# Patient Record
Sex: FEMALE | Race: WHITE | NOT HISPANIC OR LATINO | Employment: OTHER | RURAL
[De-identification: names, ages, dates, MRNs, and addresses within clinical notes are randomized per-mention and may not be internally consistent; named-entity substitution may affect disease eponyms.]

---

## 2022-03-28 ENCOUNTER — OFFICE VISIT (OUTPATIENT)
Dept: FAMILY MEDICINE CLINIC | Facility: CLINIC | Age: 67
End: 2022-03-28

## 2022-03-28 VITALS
DIASTOLIC BLOOD PRESSURE: 100 MMHG | BODY MASS INDEX: 23.21 KG/M2 | HEART RATE: 88 BPM | SYSTOLIC BLOOD PRESSURE: 162 MMHG | OXYGEN SATURATION: 99 % | WEIGHT: 131 LBS | HEIGHT: 63 IN

## 2022-03-28 DIAGNOSIS — E56.9 VITAMIN DEFICIENCY: ICD-10-CM

## 2022-03-28 DIAGNOSIS — E55.9 VITAMIN D DEFICIENCY: ICD-10-CM

## 2022-03-28 DIAGNOSIS — Z78.0 POSTMENOPAUSAL: ICD-10-CM

## 2022-03-28 DIAGNOSIS — E78.2 MIXED HYPERLIPIDEMIA: ICD-10-CM

## 2022-03-28 DIAGNOSIS — Z13.820 SCREENING FOR OSTEOPOROSIS: ICD-10-CM

## 2022-03-28 DIAGNOSIS — R53.83 FATIGUE, UNSPECIFIED TYPE: ICD-10-CM

## 2022-03-28 DIAGNOSIS — Z00.00 ROUTINE MEDICAL EXAM: Primary | ICD-10-CM

## 2022-03-28 DIAGNOSIS — R73.9 HYPERGLYCEMIA: ICD-10-CM

## 2022-03-28 DIAGNOSIS — I10 PRIMARY HYPERTENSION: ICD-10-CM

## 2022-03-28 PROCEDURE — 99397 PER PM REEVAL EST PAT 65+ YR: CPT | Performed by: NURSE PRACTITIONER

## 2022-03-28 RX ORDER — ROSUVASTATIN CALCIUM 5 MG/1
1 TABLET, COATED ORAL DAILY
COMMUNITY
End: 2022-03-28 | Stop reason: SDUPTHER

## 2022-03-28 RX ORDER — ROSUVASTATIN CALCIUM 5 MG/1
5 TABLET, COATED ORAL DAILY
Qty: 90 TABLET | Refills: 3 | Status: SHIPPED | OUTPATIENT
Start: 2022-03-28 | End: 2023-03-27

## 2022-03-28 RX ORDER — LISINOPRIL 20 MG/1
1 TABLET ORAL DAILY
COMMUNITY
End: 2022-03-28 | Stop reason: SDUPTHER

## 2022-03-28 RX ORDER — LISINOPRIL 20 MG/1
20 TABLET ORAL DAILY
Qty: 90 TABLET | Refills: 3 | Status: SHIPPED | OUTPATIENT
Start: 2022-03-28 | End: 2023-01-04 | Stop reason: SDUPTHER

## 2022-03-28 NOTE — ASSESSMENT & PLAN NOTE
Blood pressure is elevated today.  Patient states blood pressure is normally well controlled.  Continue current medications and return for blood pressure check in 2 weeks.  Continue to monitor blood pressure at home.

## 2022-03-28 NOTE — PROGRESS NOTES
"Chief Complaint  Annual Exam    Subjective          Linda Good presents to [facility] for preventative yearly exam.   Patient is here for a physical exam and medication refills.  She is doing well today with no complaints.  She tries to watch her diet and is active.  She saw GYN recently for health maintenance there.  Her blood pressure is normally well controlled but she has been under increased stress today with family issues.      Objective   Vital Signs:   /100 (BP Location: Left arm, Patient Position: Sitting, Cuff Size: Adult)   Pulse 88   Ht 160 cm (63\")   Wt 59.4 kg (131 lb)   SpO2 99%   BMI 23.21 kg/m²     Body mass index is 23.21 kg/m².    Predictive Model Details   No score data available for Risk of Fall        PHQ-9 Depression Screening  Little interest or pleasure in doing things? 0-->not at all   Feeling down, depressed, or hopeless?     Trouble falling or staying asleep, or sleeping too much?     Feeling tired or having little energy?     Poor appetite or overeating?     Feeling bad about yourself - or that you are a failure or have let yourself or your family down?     Trouble concentrating on things, such as reading the newspaper or watching television?     Moving or speaking so slowly that other people could have noticed? Or the opposite - being so fidgety or restless that you have been moving around a lot more than usual?     Thoughts that you would be better off dead, or of hurting yourself in some way?     PHQ-9 Total Score 0   If you checked off any problems, how difficult have these problems made it for you to do your work, take care of things at home, or get along with other people?       Health Maintenance   Topic Date Due   • DXA SCAN  Never done   • COLORECTAL CANCER SCREENING  Never done   • ANNUAL PHYSICAL  Never done   • COVID-19 Vaccine (1) Never done   • ZOSTER VACCINE (1 of 2) Never done   • INFLUENZA VACCINE  08/01/2021   • " Pneumococcal Vaccine 65+ (1 of 1 - PPSV23) Never done   • HEPATITIS C SCREENING  Never done   • LIPID PANEL  Never done   • MAMMOGRAM  10/12/2023   • TDAP/TD VACCINES (2 - Td or Tdap) 04/11/2031        Immunization History   Administered Date(s) Administered   • Influenza, Unspecified 10/17/2019       Review of Systems   Constitutional: Negative for fatigue and fever.   Respiratory: Negative for shortness of breath.    Cardiovascular: Negative for chest pain, palpitations and leg swelling.   Neurological: Negative for syncope.   Psychiatric/Behavioral: The patient is not nervous/anxious.         Negative depression        Past History:  Medical History: has a past medical history of Pregnancy.   Surgical History: has no past surgical history on file.   Family History: family history includes Coronary artery disease in her sister; Diabetes in her mother; Lung cancer in her father; Other in her mother.   Social History: reports that she quit smoking about 20 years ago. She started smoking about 47 years ago. She has a 13.50 pack-year smoking history. She has never used smokeless tobacco.       Allergies: Patient has no known allergies.    Physical Exam  Constitutional:       Appearance: Normal appearance.   HENT:      Head: Normocephalic.   Eyes:      Conjunctiva/sclera: Conjunctivae normal.      Pupils: Pupils are equal, round, and reactive to light.   Cardiovascular:      Rate and Rhythm: Normal rate and regular rhythm.      Heart sounds: Normal heart sounds.   Pulmonary:      Effort: Pulmonary effort is normal.      Breath sounds: Normal breath sounds.   Abdominal:      Tenderness: There is no abdominal tenderness.   Musculoskeletal:         General: Normal range of motion.   Skin:     General: Skin is warm and dry.      Capillary Refill: Capillary refill takes less than 2 seconds.   Neurological:      General: No focal deficit present.      Mental Status: She is alert and oriented to person, place, and time.    Psychiatric:         Mood and Affect: Mood normal.         Behavior: Behavior normal.         Thought Content: Thought content normal.         Judgment: Judgment normal.          Result Review :                   Assessment and Plan    Diagnoses and all orders for this visit:    1. Routine medical exam (Primary)  Comments:  Patient is up-to-date on Pap, mammogram, and colon cancer screening.  DEXA scan ordered.  We discussed diet and exercise.  Return for labs    2. Mixed hyperlipidemia  Assessment & Plan:  Return for fasting labs.  Continue current medications.  We discussed diet and exercise.    Orders:  -     rosuvastatin (CRESTOR) 5 MG tablet; Take 1 tablet by mouth Daily.  Dispense: 90 tablet; Refill: 3  -     CBC & Differential; Future  -     Comprehensive Metabolic Panel; Future  -     Lipid Panel; Future    3. Primary hypertension  Assessment & Plan:  Blood pressure is elevated today.  Patient states blood pressure is normally well controlled.  Continue current medications and return for blood pressure check in 2 weeks.  Continue to monitor blood pressure at home.    Orders:  -     lisinopril (PRINIVIL,ZESTRIL) 20 MG tablet; Take 1 tablet by mouth Daily.  Dispense: 90 tablet; Refill: 3    4. Vitamin deficiency  -     Vitamin B12; Future  -     Folate; Future    5. Hyperglycemia  -     Hemoglobin A1c; Future    6. Screening for osteoporosis  -     DEXA Bone Density Axial    7. Vitamin D deficiency  -     Vitamin D 25 Hydroxy; Future    8. Fatigue, unspecified type  -     TSH Rfx On Abnormal To Free T4; Future    9. Postmenopausal  -     DEXA Bone Density Axial        BMI is within normal parameters. No follow-up required.         Current Outpatient Medications:   •  lisinopril (PRINIVIL,ZESTRIL) 20 MG tablet, Take 1 tablet by mouth Daily., Disp: 90 tablet, Rfl: 3  •  rosuvastatin (CRESTOR) 5 MG tablet, Take 1 tablet by mouth Daily., Disp: 90 tablet, Rfl: 3    Follow Up   Return in about 1 year (around  3/28/2023) for Annual physical.  Patient was given instructions and counseling regarding her condition or for health maintenance advice. Please see specific information pulled into the AVS if appropriate.     Liv Hernandez, APRN

## 2022-04-05 ENCOUNTER — LAB (OUTPATIENT)
Dept: FAMILY MEDICINE CLINIC | Facility: CLINIC | Age: 67
End: 2022-04-05

## 2022-04-05 ENCOUNTER — TELEPHONE (OUTPATIENT)
Dept: FAMILY MEDICINE CLINIC | Facility: CLINIC | Age: 67
End: 2022-04-05

## 2022-04-05 DIAGNOSIS — E78.2 MIXED HYPERLIPIDEMIA: ICD-10-CM

## 2022-04-05 DIAGNOSIS — E56.9 VITAMIN DEFICIENCY: ICD-10-CM

## 2022-04-05 DIAGNOSIS — E55.9 VITAMIN D DEFICIENCY: ICD-10-CM

## 2022-04-05 DIAGNOSIS — R53.83 FATIGUE, UNSPECIFIED TYPE: ICD-10-CM

## 2022-04-05 DIAGNOSIS — I10 PRIMARY HYPERTENSION: Primary | ICD-10-CM

## 2022-04-05 DIAGNOSIS — R73.9 HYPERGLYCEMIA: ICD-10-CM

## 2022-04-05 PROCEDURE — 36415 COLL VENOUS BLD VENIPUNCTURE: CPT | Performed by: NURSE PRACTITIONER

## 2022-04-05 RX ORDER — LISINOPRIL 40 MG/1
40 TABLET ORAL DAILY
Qty: 90 TABLET | Refills: 1 | Status: SHIPPED | OUTPATIENT
Start: 2022-04-05 | End: 2023-01-04

## 2022-04-05 NOTE — TELEPHONE ENCOUNTER
Checked [facility] to make sure, last refill was for lisinopril 40 mg. Sent corrected dosage to pharmacy. Notified patient.

## 2022-04-06 LAB
25(OH)D3+25(OH)D2 SERPL-MCNC: 36.8 NG/ML (ref 30–100)
ALBUMIN SERPL-MCNC: 4.9 G/DL (ref 3.8–4.8)
ALBUMIN/GLOB SERPL: 2.2 {RATIO} (ref 1.2–2.2)
ALP SERPL-CCNC: 52 IU/L (ref 44–121)
ALT SERPL-CCNC: 27 IU/L (ref 0–32)
AST SERPL-CCNC: 33 IU/L (ref 0–40)
BASOPHILS # BLD AUTO: 0 X10E3/UL (ref 0–0.2)
BASOPHILS NFR BLD AUTO: 1 %
BILIRUB SERPL-MCNC: 0.3 MG/DL (ref 0–1.2)
BUN SERPL-MCNC: 14 MG/DL (ref 8–27)
BUN/CREAT SERPL: 18 (ref 12–28)
CALCIUM SERPL-MCNC: 9.9 MG/DL (ref 8.7–10.3)
CHLORIDE SERPL-SCNC: 101 MMOL/L (ref 96–106)
CHOLEST SERPL-MCNC: 190 MG/DL (ref 100–199)
CO2 SERPL-SCNC: 21 MMOL/L (ref 20–29)
CREAT SERPL-MCNC: 0.79 MG/DL (ref 0.57–1)
EGFRCR SERPLBLD CKD-EPI 2021: 83 ML/MIN/1.73
EOSINOPHIL # BLD AUTO: 0.2 X10E3/UL (ref 0–0.4)
EOSINOPHIL NFR BLD AUTO: 2 %
ERYTHROCYTE [DISTWIDTH] IN BLOOD BY AUTOMATED COUNT: 12.5 % (ref 11.7–15.4)
FOLATE SERPL-MCNC: 9.3 NG/ML
GLOBULIN SER CALC-MCNC: 2.2 G/DL (ref 1.5–4.5)
GLUCOSE SERPL-MCNC: 97 MG/DL (ref 65–99)
HBA1C MFR BLD: 5.8 % (ref 4.8–5.6)
HCT VFR BLD AUTO: 40.4 % (ref 34–46.6)
HDLC SERPL-MCNC: 70 MG/DL
HGB BLD-MCNC: 13.5 G/DL (ref 11.1–15.9)
IMM GRANULOCYTES # BLD AUTO: 0 X10E3/UL (ref 0–0.1)
IMM GRANULOCYTES NFR BLD AUTO: 0 %
LDLC SERPL CALC-MCNC: 41 MG/DL (ref 0–99)
LYMPHOCYTES # BLD AUTO: 3.1 X10E3/UL (ref 0.7–3.1)
LYMPHOCYTES NFR BLD AUTO: 46 %
MCH RBC QN AUTO: 32.2 PG (ref 26.6–33)
MCHC RBC AUTO-ENTMCNC: 33.4 G/DL (ref 31.5–35.7)
MCV RBC AUTO: 96 FL (ref 79–97)
MONOCYTES # BLD AUTO: 0.6 X10E3/UL (ref 0.1–0.9)
MONOCYTES NFR BLD AUTO: 9 %
NEUTROPHILS # BLD AUTO: 2.9 X10E3/UL (ref 1.4–7)
NEUTROPHILS NFR BLD AUTO: 42 %
PLATELET # BLD AUTO: 307 X10E3/UL (ref 150–450)
POTASSIUM SERPL-SCNC: 4.4 MMOL/L (ref 3.5–5.2)
PROT SERPL-MCNC: 7.1 G/DL (ref 6–8.5)
RBC # BLD AUTO: 4.19 X10E6/UL (ref 3.77–5.28)
SODIUM SERPL-SCNC: 140 MMOL/L (ref 134–144)
TRIGL SERPL-MCNC: 569 MG/DL (ref 0–149)
TSH SERPL DL<=0.005 MIU/L-ACNC: 3.5 UIU/ML (ref 0.45–4.5)
VIT B12 SERPL-MCNC: 860 PG/ML (ref 232–1245)
VLDLC SERPL CALC-MCNC: 79 MG/DL (ref 5–40)
WBC # BLD AUTO: 6.8 X10E3/UL (ref 3.4–10.8)

## 2022-04-11 ENCOUNTER — PATIENT MESSAGE (OUTPATIENT)
Dept: FAMILY MEDICINE CLINIC | Facility: CLINIC | Age: 67
End: 2022-04-11

## 2022-08-08 ENCOUNTER — TELEPHONE (OUTPATIENT)
Dept: FAMILY MEDICINE CLINIC | Facility: CLINIC | Age: 67
End: 2022-08-08

## 2022-08-08 DIAGNOSIS — E78.2 MIXED HYPERLIPIDEMIA: ICD-10-CM

## 2022-08-08 DIAGNOSIS — R73.9 HYPERGLYCEMIA: Primary | ICD-10-CM

## 2022-08-08 NOTE — TELEPHONE ENCOUNTER
Caller: Linda Good    Relationship: Self    Best call back number: [phone]    What orders are you requesting (i.e. lab or imaging): LABS     In what timeframe would the patient need to come in: SOON    Where will you receive your lab/imaging services: AT THE PRACTICE     Additional notes: THE PATIENT REPORTS THAT HAD HER PHYSICAL  04/2022, SHE WAS TOLD TO COME BACK IN THREE MONTHS TO GET LABS DONE. THE PATIENT REPORTS IT HAS BEEN LONGER THAN THREE MONTHS BUT IS REQUESTING ORDERS FOR LABS TO BE PUT IN THE SYSTEM. THE PATIENT DECLINED AN OFFICE VISIT AND IS REQUESTING THE LAB ORDERS. PLEASE CALL AND ADVISE [phone].

## 2022-08-12 ENCOUNTER — LAB (OUTPATIENT)
Dept: FAMILY MEDICINE CLINIC | Facility: CLINIC | Age: 67
End: 2022-08-12

## 2022-08-12 DIAGNOSIS — R73.9 HYPERGLYCEMIA: ICD-10-CM

## 2022-08-12 DIAGNOSIS — E78.2 MIXED HYPERLIPIDEMIA: ICD-10-CM

## 2022-08-12 PROCEDURE — 36415 COLL VENOUS BLD VENIPUNCTURE: CPT | Performed by: NURSE PRACTITIONER

## 2022-08-13 LAB
CHOLEST SERPL-MCNC: 193 MG/DL (ref 100–199)
HBA1C MFR BLD: 5.7 % (ref 4.8–5.6)
HDLC SERPL-MCNC: 82 MG/DL
LDLC SERPL CALC-MCNC: 66 MG/DL (ref 0–99)
TRIGL SERPL-MCNC: 292 MG/DL (ref 0–149)
VLDLC SERPL CALC-MCNC: 45 MG/DL (ref 5–40)

## 2022-08-17 NOTE — PROGRESS NOTES
Your cholesterol levels have improved since we last checked.  Continue your current medications.  Continue watching your diet for sugars, carbohydrates, and fats.  Try to exercise several days per week.  Your A1c has improved as well but still shows prediabetes.  Keep up the good work!  Take care!

## 2022-10-02 DIAGNOSIS — I10 PRIMARY HYPERTENSION: ICD-10-CM

## 2022-10-03 RX ORDER — LISINOPRIL 40 MG/1
TABLET ORAL
Qty: 90 TABLET | Refills: 1 | OUTPATIENT
Start: 2022-10-03

## 2022-10-03 NOTE — TELEPHONE ENCOUNTER
Last appt 04/13/22  Next appt:not scheduled    Lisinopril 40mg 90 w/ 1 RF was sent 04/05/22 by katiana boyle    Lisinopril 20mg was sent 03/28/22 90 w/ 3 RF    I dont show that we ever send 40mg before. Unsure why pharm ever requested that.

## 2023-01-03 DIAGNOSIS — I10 PRIMARY HYPERTENSION: ICD-10-CM

## 2023-01-04 RX ORDER — LISINOPRIL 40 MG/1
TABLET ORAL
Qty: 90 TABLET | Refills: 1 | Status: SHIPPED | OUTPATIENT
Start: 2023-01-04

## 2023-01-04 RX ORDER — LISINOPRIL 40 MG/1
40 TABLET ORAL DAILY
Qty: 90 TABLET | Refills: 1 | OUTPATIENT
Start: 2023-01-04

## 2023-03-27 DIAGNOSIS — E78.2 MIXED HYPERLIPIDEMIA: ICD-10-CM

## 2023-03-27 RX ORDER — ROSUVASTATIN CALCIUM 5 MG/1
TABLET, COATED ORAL
Qty: 90 TABLET | Refills: 3 | Status: SHIPPED | OUTPATIENT
Start: 2023-03-27

## 2023-05-30 ENCOUNTER — NURSE TRIAGE (OUTPATIENT)
Dept: CALL CENTER | Facility: HOSPITAL | Age: 68
End: 2023-05-30

## 2023-05-30 NOTE — TELEPHONE ENCOUNTER
Patient is calling the [facility] to schedule an appointment with her PCP. She states that she has pain in the center of the chest that goes into the back. It comes and goes. She has been doing a lot of yard work lately and thouht it may be the cause because it went away but now it is back and worse.   She never has indigestion and it doesn't happen after eating.   Triage completed and the patient advised to go to the ED for evaluation. The patient did not want to go to the ED. I did stress the importance of being evaluated but she states that she would just like to talk to her PCP office. I called the PCP back line and was not able to reach the office. I talked to the patient and advised her again to to the the ED or at least a UC. She states that she will go talk to her  and come up with a plan of what she should do.     Reason for Disposition  • [1] Chest pain lasts > 5 minutes AND [2] occurred in past 3 days (72 hours) (Exception: Feels exactly the same as previously diagnosed heartburn and has accompanying sour taste in mouth.)    Additional Information  • Negative: SEVERE difficulty breathing (e.g., struggling for each breath, speaks in single words)  • Negative: Difficult to awaken or acting confused (e.g., disoriented, slurred speech)  • Negative: Shock suspected (e.g., cold/pale/clammy skin, too weak to stand, low BP, rapid pulse)  • Negative: Passed out (i.e., lost consciousness, collapsed and was not responding)  • Negative: [1] Chest pain lasts > 5 minutes AND [2] age > 44  • Negative: [1] Chest pain lasts > 5 minutes AND [2] age > 30 AND [3] one or more cardiac risk factors (e.g., diabetes, high blood pressure, high cholesterol, smoker, or strong family history of heart disease)  • Negative: [1] Chest pain lasts > 5 minutes AND [2] history of heart disease (i.e., angina, heart attack, heart failure, bypass surgery, takes nitroglycerin)  • Negative: [1] Chest pain lasts > 5 minutes AND [2] described as  "crushing, pressure-like, or heavy  • Negative: Heart beating < 50 beats per minute OR > 140 beats per minute  • Negative: Visible sweat on face or sweat dripping down face  • Negative: Sounds like a life-threatening emergency to the triager  • Negative: Followed a chest injury  • Negative: SEVERE chest pain  • Negative: [1] Chest pain (or \"angina\") comes and goes AND [2] is happening more often (increasing in frequency) or getting worse (increasing in severity)  (Exception: Chest pains that last only a few seconds.)  • Negative: Pain also in shoulder(s) or arm(s) or jaw  (Exception: Pain is clearly made worse by movement.)  • Negative: Difficulty breathing  • Negative: Dizziness or lightheadedness  • Negative: Coughing up blood  • Negative: Cocaine use within last 3 days  • Negative: Major surgery in past month  • Negative: Hip or leg fracture (broken bone) in past month (or had cast on leg or ankle in past month)  • Negative: Illness requiring prolonged bedrest in past month (e.g., immobilization, long hospital stay)  • Negative: Long-distance travel in past month (e.g., car, bus, train, plane; with trip lasting 6 or more hours)  • Negative: History of prior \"blood clot\" in leg or lungs (i.e., deep vein thrombosis, pulmonary embolism)  • Negative: History of inherited increased risk of blood clots (e.g., Factor 5 Leiden, Anti-thrombin 3, Protein C or Protein S deficiency, Prothrombin mutation)  • Negative: Cancer treatment in past six months (or has cancer now)    Answer Assessment - Initial Assessment Questions  1. LOCATION: \"Where does it hurt?\"        Center of the chest   2. RADIATION: \"Does the pain go anywhere else?\" (e.g., into neck, jaw, arms, back)    Straight into the back   3. ONSET: \"When did the chest pain begin?\" (Minutes, hours or days)      3 weeks ago for the first time. If she is busy doing something, she doesn't feel it as much. When she is sitting still, she notices it the most.   Started again " "yesterday and it has been bothersome since then.   4. PATTERN: \"Does the pain come and go, or has it been constant since it started?\"  \"Does it get worse with exertion?\"      Don't notice when she is busy. She feels it most when she is at rest.   5. DURATION: \"How long does it last\" (e.g., seconds, minutes, hours)      Hard to say, this time it has been a few hours already   6. SEVERITY: \"How bad is the pain?\"  (e.g., Scale 1-10; mild, moderate, or severe)     - MILD (1-3): doesn't interfere with normal activities      - MODERATE (4-7): interferes with normal activities or awakens from sleep     - SEVERE (8-10): excruciating pain, unable to do any normal activities       Moderate   7. CARDIAC RISK FACTORS: \"Do you have any history of heart problems or risk factors for heart disease?\" (e.g., angina, prior heart attack; diabetes, high blood pressure, high cholesterol, smoker, or strong family history of heart disease)     HTN, high cholesterol, no family hx  8. PULMONARY RISK FACTORS: \"Do you have any history of lung disease?\"  (e.g., blood clots in lung, asthma, emphysema, birth control pills)      no  9. CAUSE: \"What do you think is causing the chest pain?\"    Note sure  10. OTHER SYMPTOMS: \"Do you have any other symptoms?\" (e.g., dizziness, nausea, vomiting, sweating, fever, difficulty breathing, cough)      no  11. PREGNANCY: \"Is there any chance you are pregnant?\" \"When was your last menstrual period?\"        NA    Protocols used: CHEST PAIN-ADULT-AH      "

## 2023-05-31 ENCOUNTER — TELEPHONE (OUTPATIENT)
Dept: FAMILY MEDICINE CLINIC | Facility: CLINIC | Age: 68
End: 2023-05-31

## 2023-05-31 NOTE — TELEPHONE ENCOUNTER
REC'D CALL FROM [facility] SUPERVISOR, PT CALLED TO SCHEDULE APPT FOR CP THAT HAS WOKE HER UP AT NIGHT AND DURING GARDENING ACTIVIIES, ADVISED TO GO TO ER BY H[facility], NURSE TRIAGE AND [facility] SUPERVISOR BUT HAS REFUSED TO GO. I SPOKE WITH KATLYN AND CARRIE CORTEZ, THEY ADVISE FOR PT TO GO TO ER FOR TESTING AND MONITORING. I CALLED PT BACK TO ADVISE CARRIE REPORTS SHE NEEDS TO GO TO ER. PT AGAIN REFUSED TO GO TO ER, WANTS TO COME IN TO OFFICE TO SIT DOWN AND TALK TO PROVIDER, PCP HAS NO AVAILABLE OPENINGS TODAY, SHE HAS BEEN SCHEDULED WITH FIRST AVAILABLE PROVIDER WITH OPENING, DR THOMPSON ON THURS 6/1/23. PT AGAIN ADVISED THAT SHE SHOULD GO TO ER IF SYMPTOMS GET WORSE.

## 2023-07-28 ENCOUNTER — OFFICE VISIT (OUTPATIENT)
Dept: FAMILY MEDICINE CLINIC | Facility: CLINIC | Age: 68
End: 2023-07-28
Payer: MEDICARE

## 2023-07-28 VITALS
HEART RATE: 90 BPM | BODY MASS INDEX: 22.15 KG/M2 | SYSTOLIC BLOOD PRESSURE: 130 MMHG | WEIGHT: 125 LBS | HEIGHT: 63 IN | OXYGEN SATURATION: 99 % | DIASTOLIC BLOOD PRESSURE: 78 MMHG

## 2023-07-28 DIAGNOSIS — E56.9 VITAMIN DEFICIENCY: Primary | ICD-10-CM

## 2023-07-28 DIAGNOSIS — I10 PRIMARY HYPERTENSION: ICD-10-CM

## 2023-07-28 DIAGNOSIS — E78.2 MIXED HYPERLIPIDEMIA: ICD-10-CM

## 2023-07-28 DIAGNOSIS — R73.9 HYPERGLYCEMIA: ICD-10-CM

## 2023-07-28 DIAGNOSIS — E55.9 VITAMIN D DEFICIENCY: ICD-10-CM

## 2023-07-28 PROBLEM — H25.813 COMBINED FORMS OF AGE-RELATED CATARACT OF BOTH EYES: Status: ACTIVE | Noted: 2023-01-11

## 2023-07-28 PROBLEM — Z86.19 HISTORY OF HISTOPLASMOSIS: Status: ACTIVE | Noted: 2023-01-11

## 2023-07-28 PROBLEM — H25.10 NUCLEAR SENILE CATARACT: Status: ACTIVE | Noted: 2023-01-11

## 2023-07-28 RX ORDER — ROSUVASTATIN CALCIUM 5 MG/1
5 TABLET, COATED ORAL DAILY
Qty: 90 TABLET | Refills: 3 | Status: SHIPPED | OUTPATIENT
Start: 2023-07-28

## 2023-07-28 RX ORDER — LISINOPRIL 40 MG/1
40 TABLET ORAL DAILY
Qty: 90 TABLET | Refills: 3 | Status: SHIPPED | OUTPATIENT
Start: 2023-07-28

## 2023-07-28 NOTE — PROGRESS NOTES
"Chief Complaint  Hypertension    Subjective          Linda Good presents to [facility]  History of Present Illness  Pt is here to follow up on HTN and hyperlipidemia. She watches her diet and is active. Her BP has been controlled.   Hypertension  Pertinent negatives include no chest pain, palpitations or shortness of breath.     Objective   Vital Signs:   /78   Pulse 90   Ht 160 cm (63\")   Wt 56.7 kg (125 lb)   SpO2 99%   BMI 22.14 kg/m²     Body mass index is 22.14 kg/m².    Review of Systems   Constitutional:  Negative for fatigue and fever.   Respiratory:  Negative for shortness of breath.    Cardiovascular:  Negative for chest pain, palpitations and leg swelling.   Neurological:  Negative for syncope.   Psychiatric/Behavioral:  The patient is not nervous/anxious.         Current Outpatient Medications:     lisinopril (PRINIVIL,ZESTRIL) 40 MG tablet, Take 1 tablet by mouth Daily., Disp: 90 tablet, Rfl: 3    rosuvastatin (CRESTOR) 5 MG tablet, Take 1 tablet by mouth Daily., Disp: 90 tablet, Rfl: 3      Allergies: Patient has no known allergies.    Physical Exam  Constitutional:       Appearance: Normal appearance.   HENT:      Head: Normocephalic.   Eyes:      Conjunctiva/sclera: Conjunctivae normal.      Pupils: Pupils are equal, round, and reactive to light.   Cardiovascular:      Rate and Rhythm: Normal rate and regular rhythm.      Heart sounds: Normal heart sounds.   Pulmonary:      Effort: Pulmonary effort is normal.      Breath sounds: Normal breath sounds.   Abdominal:      Tenderness: There is no abdominal tenderness.   Musculoskeletal:         General: Normal range of motion.   Skin:     General: Skin is warm and dry.      Capillary Refill: Capillary refill takes less than 2 seconds.   Neurological:      General: No focal deficit present.      Mental Status: She is alert and oriented to person, place, and time.   Psychiatric:         Mood and Affect: Mood " normal.         Behavior: Behavior normal.         Thought Content: Thought content normal.         Judgment: Judgment normal.        Result Review :                   Assessment and Plan    Diagnoses and all orders for this visit:    1. Vitamin deficiency (Primary)  -     Vitamin B12  -     Folate    2. Primary hypertension  Comments:  Continue current meds. Monitor BP and keep log. Labs drawn.  Orders:  -     lisinopril (PRINIVIL,ZESTRIL) 40 MG tablet; Take 1 tablet by mouth Daily.  Dispense: 90 tablet; Refill: 3    3. Mixed hyperlipidemia  Comments:  Continue Crestor. We discussed diet and exercise. Labs drawn.  Orders:  -     rosuvastatin (CRESTOR) 5 MG tablet; Take 1 tablet by mouth Daily.  Dispense: 90 tablet; Refill: 3  -     CBC & Differential  -     Comprehensive Metabolic Panel  -     Lipid Panel    4. Vitamin D deficiency  -     Vitamin D,25-Hydroxy    5. Hyperglycemia  -     Hemoglobin A1c    Other orders  -     Pneumococcal Conjugate Vaccine 20-Valent (PCV20)                Follow Up   No follow-ups on file.  Patient was given instructions and counseling regarding her condition or for health maintenance advice. Please see specific information pulled into the AVS if appropriate.     OTTONIEL Hall

## 2023-07-29 LAB
25(OH)D3+25(OH)D2 SERPL-MCNC: 41.6 NG/ML (ref 30–100)
ALBUMIN SERPL-MCNC: 4.8 G/DL (ref 3.9–4.9)
ALBUMIN/GLOB SERPL: 2.4 {RATIO} (ref 1.2–2.2)
ALP SERPL-CCNC: 52 IU/L (ref 44–121)
ALT SERPL-CCNC: 19 IU/L (ref 0–32)
AST SERPL-CCNC: 21 IU/L (ref 0–40)
BASOPHILS # BLD AUTO: 0.1 X10E3/UL (ref 0–0.2)
BASOPHILS NFR BLD AUTO: 1 %
BILIRUB SERPL-MCNC: 0.3 MG/DL (ref 0–1.2)
BUN SERPL-MCNC: 19 MG/DL (ref 8–27)
BUN/CREAT SERPL: 28 (ref 12–28)
CALCIUM SERPL-MCNC: 9.5 MG/DL (ref 8.7–10.3)
CHLORIDE SERPL-SCNC: 102 MMOL/L (ref 96–106)
CHOLEST SERPL-MCNC: 157 MG/DL (ref 100–199)
CO2 SERPL-SCNC: 22 MMOL/L (ref 20–29)
CREAT SERPL-MCNC: 0.69 MG/DL (ref 0.57–1)
EGFRCR SERPLBLD CKD-EPI 2021: 96 ML/MIN/1.73
EOSINOPHIL # BLD AUTO: 0.1 X10E3/UL (ref 0–0.4)
EOSINOPHIL NFR BLD AUTO: 2 %
ERYTHROCYTE [DISTWIDTH] IN BLOOD BY AUTOMATED COUNT: 12.5 % (ref 11.7–15.4)
FOLATE SERPL-MCNC: 7.2 NG/ML
GLOBULIN SER CALC-MCNC: 2 G/DL (ref 1.5–4.5)
GLUCOSE SERPL-MCNC: 114 MG/DL (ref 70–99)
HBA1C MFR BLD: 5.5 % (ref 4.8–5.6)
HCT VFR BLD AUTO: 38.9 % (ref 34–46.6)
HDLC SERPL-MCNC: 70 MG/DL
HGB BLD-MCNC: 13 G/DL (ref 11.1–15.9)
IMM GRANULOCYTES # BLD AUTO: 0 X10E3/UL (ref 0–0.1)
IMM GRANULOCYTES NFR BLD AUTO: 0 %
LDLC SERPL CALC-MCNC: 33 MG/DL (ref 0–99)
LYMPHOCYTES # BLD AUTO: 2.2 X10E3/UL (ref 0.7–3.1)
LYMPHOCYTES NFR BLD AUTO: 33 %
MCH RBC QN AUTO: 32.3 PG (ref 26.6–33)
MCHC RBC AUTO-ENTMCNC: 33.4 G/DL (ref 31.5–35.7)
MCV RBC AUTO: 97 FL (ref 79–97)
MONOCYTES # BLD AUTO: 0.6 X10E3/UL (ref 0.1–0.9)
MONOCYTES NFR BLD AUTO: 9 %
NEUTROPHILS # BLD AUTO: 3.8 X10E3/UL (ref 1.4–7)
NEUTROPHILS NFR BLD AUTO: 55 %
PLATELET # BLD AUTO: 271 X10E3/UL (ref 150–450)
POTASSIUM SERPL-SCNC: 4.3 MMOL/L (ref 3.5–5.2)
PROT SERPL-MCNC: 6.8 G/DL (ref 6–8.5)
RBC # BLD AUTO: 4.02 X10E6/UL (ref 3.77–5.28)
SODIUM SERPL-SCNC: 139 MMOL/L (ref 134–144)
TRIGL SERPL-MCNC: 380 MG/DL (ref 0–149)
VIT B12 SERPL-MCNC: 947 PG/ML (ref 232–1245)
VLDLC SERPL CALC-MCNC: 54 MG/DL (ref 5–40)
WBC # BLD AUTO: 6.8 X10E3/UL (ref 3.4–10.8)

## 2024-04-11 ENCOUNTER — OFFICE VISIT (OUTPATIENT)
Dept: FAMILY MEDICINE CLINIC | Facility: CLINIC | Age: 69
End: 2024-04-11
Payer: MEDICARE

## 2024-04-11 VITALS
BODY MASS INDEX: 22.5 KG/M2 | HEIGHT: 63 IN | HEART RATE: 91 BPM | OXYGEN SATURATION: 98 % | SYSTOLIC BLOOD PRESSURE: 154 MMHG | DIASTOLIC BLOOD PRESSURE: 82 MMHG | WEIGHT: 127 LBS

## 2024-04-11 DIAGNOSIS — R73.9 HYPERGLYCEMIA: ICD-10-CM

## 2024-04-11 DIAGNOSIS — E55.9 VITAMIN D DEFICIENCY: ICD-10-CM

## 2024-04-11 DIAGNOSIS — Z11.59 ENCOUNTER FOR HEPATITIS C SCREENING TEST FOR LOW RISK PATIENT: ICD-10-CM

## 2024-04-11 DIAGNOSIS — E78.2 MIXED HYPERLIPIDEMIA: ICD-10-CM

## 2024-04-11 DIAGNOSIS — I10 HYPERTENSION, ESSENTIAL: ICD-10-CM

## 2024-04-11 DIAGNOSIS — E78.2 HYPERLIPIDEMIA, MIXED: ICD-10-CM

## 2024-04-11 DIAGNOSIS — Z00.00 ROUTINE PHYSICAL EXAMINATION: Primary | ICD-10-CM

## 2024-04-11 DIAGNOSIS — I10 PRIMARY HYPERTENSION: ICD-10-CM

## 2024-04-11 DIAGNOSIS — R00.2 PALPITATIONS: ICD-10-CM

## 2024-04-11 RX ORDER — ROSUVASTATIN CALCIUM 5 MG/1
5 TABLET, COATED ORAL DAILY
Qty: 90 TABLET | Refills: 0 | Status: SHIPPED | OUTPATIENT
Start: 2024-04-11

## 2024-04-11 RX ORDER — LISINOPRIL 40 MG/1
40 TABLET ORAL DAILY
Qty: 90 TABLET | Refills: 0 | Status: SHIPPED | OUTPATIENT
Start: 2024-04-11

## 2024-04-11 NOTE — PROGRESS NOTES
"Chief Complaint  [facility] (Switching care from [facility] )    Subjective          Linda Good presents to [facility]  History of Present Illness  Patient in today for routine physical exam and fasting labs. States her blood pressure is normally in range- states forgot medication yesterday. States is utd on mammogram at this time- going next week for diagnostic fup regarding. She states is utd on colonoscopy. She states had episodes of chest pain last year noticed after gardening- went to ER and had negative workup- has not returned since. However,  has had a few occasions of strong heart palpitations over past few months- that have lasted up to a few hours at a time. Currently denies chest pain or palpitation.   Hypertension  This is a chronic problem. Associated symptoms include palpitations. Pertinent negatives include no blurred vision, chest pain, headaches, peripheral edema or shortness of breath.   Hyperlipidemia  This is a chronic problem. Pertinent negatives include no chest pain, myalgias or shortness of breath. Current antihyperlipidemic treatment includes statins.       Objective   Vital Signs:   /82   Pulse 91   Ht 160 cm (63\")   Wt 57.6 kg (127 lb)   SpO2 98%   BMI 22.50 kg/m²     Body mass index is 22.5 kg/m².    Review of Systems   Constitutional:  Negative for fatigue and fever.   HENT:  Negative for congestion and sore throat.    Eyes:  Negative for blurred vision.   Respiratory:  Negative for cough and shortness of breath.    Cardiovascular:  Positive for palpitations. Negative for chest pain and leg swelling.   Gastrointestinal:  Negative for abdominal pain, blood in stool, diarrhea, nausea and vomiting.   Genitourinary:  Negative for dysuria and frequency.   Musculoskeletal:  Negative for myalgias.   Neurological:  Negative for dizziness and headache.       Past History:  Medical History: has a past medical history of Diabetes mellitus " (3/1/1993), Hyperlipidemia, Hypertension, and Pregnancy.   Surgical History: has a past surgical history that includes Breast surgery (1997); Tubal ligation (1993); and Colonoscopy.   Family History: family history includes Cancer in her father; Coronary artery disease in her sister; Diabetes in her mother; Heart disease in her mother; Lung cancer in her father; Other in her mother.   Social History: reports that she quit smoking about 22 years ago. Her smoking use included cigarettes. She started smoking about 49 years ago. She has a 13.5 pack-year smoking history. She has never used smokeless tobacco. She reports current alcohol use of about 10.0 standard drinks of alcohol per week. She reports that she does not use drugs.      Current Outpatient Medications:     lisinopril (PRINIVIL,ZESTRIL) 40 MG tablet, Take 1 tablet by mouth Daily., Disp: 90 tablet, Rfl: 0    rosuvastatin (CRESTOR) 5 MG tablet, Take 1 tablet by mouth Daily., Disp: 90 tablet, Rfl: 0  Allergies: Patient has no known allergies.    Physical Exam  Constitutional:       Appearance: Normal appearance.   HENT:      Right Ear: Tympanic membrane normal.      Left Ear: Tympanic membrane normal.      Mouth/Throat:      Pharynx: Oropharynx is clear.   Eyes:      Conjunctiva/sclera: Conjunctivae normal.      Pupils: Pupils are equal, round, and reactive to light.   Cardiovascular:      Rate and Rhythm: Normal rate and regular rhythm.      Heart sounds: Normal heart sounds.   Pulmonary:      Effort: Pulmonary effort is normal.      Breath sounds: Normal breath sounds.   Abdominal:      Palpations: Abdomen is soft.      Tenderness: There is no abdominal tenderness.   Neurological:      Mental Status: She is oriented to person, place, and time.   Psychiatric:         Mood and Affect: Mood normal.         Behavior: Behavior normal.          ECG 12 Lead    Date/Time: 4/11/2024 2:44 PM  Performed by: Julissa Vernon PA-C    Authorized by: Julissa Vernon  VICKY  Comparison: not compared with previous ECG   Rhythm: sinus rhythm  BPM: 79  Conduction: conduction normal  ST Segments: ST segments normal  T Waves: T waves normal    Clinical impression: normal ECG            Assessment and Plan   Diagnoses and all orders for this visit:    1. Routine physical examination (Primary)  Encouraged healthy diet and exercise. Fasting labs today.   2. Hypertension, essential  -     CBC & Differential  -     Comprehensive Metabolic Panel  -     TSH  -     Hepatitis C Antibody  Continue current dosage of lisinopril. Monitor blood pressure and let us know if not staying well controlled. RTC prior to recheck as needed.   3. Hyperlipidemia, mixed  -     Lipid Panel  Continue rosuvastatin. Will check fasting lipid panel today.   4. Encounter for hepatitis C screening test for low risk patient    5. Vitamin D deficiency  -     Vitamin D,25-Hydroxy  Will check vit D with labs.   6. Hyperglycemia  -     Hemoglobin A1c  Will check hga1c with labs.   Orders:  -     rosuvastatin (CRESTOR) 5 MG tablet; Take 1 tablet by mouth Daily.  Dispense: 90 tablet; Refill: 0    7. Palpitations  -     Ambulatory Referral to Cardiology  EKG NSR today. Will put in referral for cardiology for consult. RTC or to ER for any acute/worsening symptoms prior if needed.   Other orders  -     ECG 12 Lead            Follow Up   No follow-ups on file.  Patient was given instructions and counseling regarding her condition or for health maintenance advice. Please see specific information pulled into the AVS if appropriate.     Julissa Vernon PA-C

## 2024-04-12 LAB
25(OH)D3+25(OH)D2 SERPL-MCNC: 37.8 NG/ML (ref 30–100)
ALBUMIN SERPL-MCNC: 4.5 G/DL (ref 3.9–4.9)
ALBUMIN/GLOB SERPL: 2 {RATIO} (ref 1.2–2.2)
ALP SERPL-CCNC: 57 IU/L (ref 44–121)
ALT SERPL-CCNC: 17 IU/L (ref 0–32)
AST SERPL-CCNC: 15 IU/L (ref 0–40)
BASOPHILS # BLD AUTO: 0.1 X10E3/UL (ref 0–0.2)
BASOPHILS NFR BLD AUTO: 1 %
BILIRUB SERPL-MCNC: 0.3 MG/DL (ref 0–1.2)
BUN SERPL-MCNC: 13 MG/DL (ref 8–27)
BUN/CREAT SERPL: 17 (ref 12–28)
CALCIUM SERPL-MCNC: 9.2 MG/DL (ref 8.7–10.3)
CHLORIDE SERPL-SCNC: 103 MMOL/L (ref 96–106)
CHOLEST SERPL-MCNC: 174 MG/DL (ref 100–199)
CO2 SERPL-SCNC: 22 MMOL/L (ref 20–29)
CREAT SERPL-MCNC: 0.75 MG/DL (ref 0.57–1)
EGFRCR SERPLBLD CKD-EPI 2021: 87 ML/MIN/1.73
EOSINOPHIL # BLD AUTO: 0.1 X10E3/UL (ref 0–0.4)
EOSINOPHIL NFR BLD AUTO: 1 %
ERYTHROCYTE [DISTWIDTH] IN BLOOD BY AUTOMATED COUNT: 12.4 % (ref 11.7–15.4)
GLOBULIN SER CALC-MCNC: 2.3 G/DL (ref 1.5–4.5)
GLUCOSE SERPL-MCNC: 91 MG/DL (ref 70–99)
HBA1C MFR BLD: 5.6 % (ref 4.8–5.6)
HCT VFR BLD AUTO: 37.7 % (ref 34–46.6)
HCV IGG SERPL QL IA: NON REACTIVE
HDLC SERPL-MCNC: 64 MG/DL
HGB BLD-MCNC: 12.6 G/DL (ref 11.1–15.9)
IMM GRANULOCYTES # BLD AUTO: 0 X10E3/UL (ref 0–0.1)
IMM GRANULOCYTES NFR BLD AUTO: 1 %
LDLC SERPL CALC-MCNC: 38 MG/DL (ref 0–99)
LYMPHOCYTES # BLD AUTO: 2.7 X10E3/UL (ref 0.7–3.1)
LYMPHOCYTES NFR BLD AUTO: 36 %
MCH RBC QN AUTO: 32.4 PG (ref 26.6–33)
MCHC RBC AUTO-ENTMCNC: 33.4 G/DL (ref 31.5–35.7)
MCV RBC AUTO: 97 FL (ref 79–97)
MONOCYTES # BLD AUTO: 0.8 X10E3/UL (ref 0.1–0.9)
MONOCYTES NFR BLD AUTO: 11 %
NEUTROPHILS # BLD AUTO: 4 X10E3/UL (ref 1.4–7)
NEUTROPHILS NFR BLD AUTO: 50 %
PLATELET # BLD AUTO: 304 X10E3/UL (ref 150–450)
POTASSIUM SERPL-SCNC: 4.2 MMOL/L (ref 3.5–5.2)
PROT SERPL-MCNC: 6.8 G/DL (ref 6–8.5)
RBC # BLD AUTO: 3.89 X10E6/UL (ref 3.77–5.28)
SODIUM SERPL-SCNC: 142 MMOL/L (ref 134–144)
TRIGL SERPL-MCNC: 519 MG/DL (ref 0–149)
TSH SERPL DL<=0.005 MIU/L-ACNC: 2.32 UIU/ML (ref 0.45–4.5)
VLDLC SERPL CALC-MCNC: 72 MG/DL (ref 5–40)
WBC # BLD AUTO: 7.7 X10E3/UL (ref 3.4–10.8)

## 2024-04-22 ENCOUNTER — TELEPHONE (OUTPATIENT)
Dept: CARDIOLOGY | Facility: CLINIC | Age: 69
End: 2024-04-22

## 2024-04-22 ENCOUNTER — OFFICE VISIT (OUTPATIENT)
Dept: CARDIOLOGY | Facility: CLINIC | Age: 69
End: 2024-04-22
Payer: MEDICARE

## 2024-04-22 ENCOUNTER — TELEPHONE (OUTPATIENT)
Dept: FAMILY MEDICINE CLINIC | Facility: CLINIC | Age: 69
End: 2024-04-22
Payer: MEDICARE

## 2024-04-22 VITALS
SYSTOLIC BLOOD PRESSURE: 160 MMHG | RESPIRATION RATE: 16 BRPM | OXYGEN SATURATION: 99 % | HEART RATE: 93 BPM | HEIGHT: 63 IN | DIASTOLIC BLOOD PRESSURE: 90 MMHG | BODY MASS INDEX: 21.97 KG/M2 | WEIGHT: 124 LBS

## 2024-04-22 DIAGNOSIS — E78.2 MIXED HYPERLIPIDEMIA: ICD-10-CM

## 2024-04-22 DIAGNOSIS — R07.9 CHEST PAIN, UNSPECIFIED TYPE: ICD-10-CM

## 2024-04-22 DIAGNOSIS — R00.2 PALPITATIONS: ICD-10-CM

## 2024-04-22 DIAGNOSIS — I10 PRIMARY HYPERTENSION: Primary | ICD-10-CM

## 2024-04-22 PROCEDURE — 3080F DIAST BP >= 90 MM HG: CPT | Performed by: INTERNAL MEDICINE

## 2024-04-22 PROCEDURE — 93000 ELECTROCARDIOGRAM COMPLETE: CPT | Performed by: INTERNAL MEDICINE

## 2024-04-22 PROCEDURE — 3077F SYST BP >= 140 MM HG: CPT | Performed by: INTERNAL MEDICINE

## 2024-04-22 PROCEDURE — 99204 OFFICE O/P NEW MOD 45 MIN: CPT | Performed by: INTERNAL MEDICINE

## 2024-04-22 RX ORDER — FENOFIBRATE 145 MG/1
145 TABLET, COATED ORAL DAILY
Qty: 90 TABLET | Refills: 3 | Status: SHIPPED | OUTPATIENT
Start: 2024-04-22

## 2024-04-22 RX ORDER — HYDROCHLOROTHIAZIDE 12.5 MG/1
12.5 CAPSULE, GELATIN COATED ORAL DAILY
Qty: 90 CAPSULE | Refills: 3 | Status: SHIPPED | OUTPATIENT
Start: 2024-04-22

## 2024-04-22 NOTE — PROGRESS NOTES
[facility] [place]  [facility] CARDIOLOGY  [address] [place] [address]  Dept: [phone]  Dept Fax: [phone]    Date: 04/22/2024  Patient: Linda Good  YOB: 1955    New Patient Office Note    Consult Reason:  Ms. Linda Good is a 69 y.o. female who presents to the clinic to establish care, seen for Chest Pain and Palpitations.   Patient doing well today with no complaints.  Denies currently any angina, orthopnea, PND, palpitations, lightheadedness, syncope or medications side-effects.  Patient had chest pains few months back when she was working on her yard, triggered by exertion and relieved by rest.  Patient took some aspirin and since then no recurrence.  Patient complaining of palpitations x 2 episodes, lasting hours, irregular fast rate approximately 120 bpm, waking her up from sleep few weeks to months back as well.  Since then no recurrence.    The followin also since then no recurrenceg portions of the patient's history were reviewed and updated as appropriate: allergies, current medications, past family history, past medical history, past social history, past surgical history, and problem list.    Medications: No Known Allergies   Current Outpatient Medications   Medication Instructions    fenofibrate (TRICOR) 145 mg, Oral, Daily    hydroCHLOROthiazide (MICROZIDE) 12.5 mg, Oral, Daily    lisinopril (PRINIVIL,ZESTRIL) 40 mg, Oral, Daily    rosuvastatin (CRESTOR) 5 mg, Oral, Daily       Subjective  Past Medical History:   Diagnosis Date    Diabetes mellitus 3/1/1993    Gestational    Hyperlipidemia     Hypertension     Pregnancy     1993,1982,1980,1978       Past Surgical History:   Procedure Laterality Date    BREAST SURGERY  1997    Lumpectomy/Benign    COLONOSCOPY      TUBAL ABDOMINAL LIGATION  1993       Family History   Problem Relation Age of Onset    Other Mother         BLOOD DISEASE    Diabetes Mother     Heart disease Mother     Lung cancer  "Father     Cancer Father     Coronary artery disease Sister         PREMATURE        Social History     Socioeconomic History    Marital status:    Tobacco Use    Smoking status: Former     Current packs/day: 0.00     Average packs/day: 0.5 packs/day for 27.0 years (13.5 ttl pk-yrs)     Types: Cigarettes     Start date: 1975     Quit date:      Years since quittin.3    Smokeless tobacco: Never   Vaping Use    Vaping status: Never Used   Substance and Sexual Activity    Alcohol use: Yes     Alcohol/week: 10.0 standard drinks of alcohol     Types: 10 Glasses of wine per week    Drug use: Never    Sexual activity: Not Currently     Partners: Male       Objective  Vitals:    24 1131   BP: 160/90   Pulse: 93   Resp: 16   SpO2: 99%   Weight: 56.2 kg (124 lb)   Height: 160 cm (63\")     Vitals:    24 1131   BP: 160/90   Pulse: 93   Resp: 16   SpO2: 99%   Weight: 56.2 kg (124 lb)   Height: 160 cm (63\")        Physical Exam  Constitutional:       Appearance: Healthy appearance. Not in distress.   Eyes:      Pupils: Pupils are equal, round, and reactive to light.   HENT:    Mouth/Throat:      Mouth: Mucous membranes are moist.   Neck:      Vascular: No carotid bruit, hepatojugular reflux, JVD or JVR. JVD normal.   Pulmonary:      Effort: Pulmonary effort is normal.      Breath sounds: Normal breath sounds. No wheezing. No rhonchi. No rales.   Chest:      Chest wall: Not tender to palpatation.   Cardiovascular:      PMI at left midclavicular line. Normal rate. Regular rhythm. Normal S1 with normal intensity. Normal S2 with normal intensity.       Murmurs: There is no murmur.      No gallop.  No click. No rub.   Pulses:     Carotid: 4+ bilaterally.     Radial: 4+ bilaterally.     Popliteal: 4+ bilaterally.     Dorsalis pedis: 4+ bilaterally.  Edema:     Peripheral edema absent.   Abdominal:      General: There is no abdominal bruit.   Skin:     General: Skin is warm.   Neurological:      Mental " "Status: Alert and oriented to person, place and time.              Labs:  Lab Results   Component Value Date     04/11/2024    K 4.2 04/11/2024     04/11/2024    CO2 22 04/11/2024    BUN 13 04/11/2024    CREATININE 0.75 04/11/2024    CALCIUM 9.2 04/11/2024    BILITOT 0.3 04/11/2024    ALKPHOS 57 04/11/2024    ALT 17 04/11/2024    AST 15 04/11/2024    GLUCOSE 91 04/11/2024    ALBUMIN 4.5 04/11/2024     Lab Results   Component Value Date    WBC 7.7 04/11/2024    HGB 12.6 04/11/2024    HCT 37.7 04/11/2024     04/11/2024     No results found for: \"APTT\", \"INR\", \"PTT\"  No results found for: \"CKTOTAL\", \"TROPONINI\", \"TROPONINT\", \"CKMBINDEX\", \"BNP\"  No results found for: \"BNP\", \"PROBNP\"    Lab Results   Component Value Date    CHLPL 174 04/11/2024    TRIG 519 (H) 04/11/2024    HDL 64 04/11/2024    LDL 38 04/11/2024     Lab Results   Component Value Date    TSH 2.320 04/11/2024       The 10-year ASCVD risk score (Rickey DK, et al., 2019) is: 12.6%    Values used to calculate the score:      Age: 69 years      Sex: Female      Is Non- : No      Diabetic: No      Tobacco smoker: No      Systolic Blood Pressure: 160 mmHg      Is BP treated: Yes      HDL Cholesterol: 64 mg/dL      Total Cholesterol: 174 mg/dL     CV Diagnostics:    ECG 12 Lead    Date/Time: 4/22/2024 12:09 PM  Performed by: Josef Alvares MD    Authorized by: Josef Alvares MD  Comparison: compared with previous ECG from 4/11/2024  Similar to previous ECG          CXR: No results found for this or any previous visit.     ECHO/MUGA: No results found for this or any previous visit.     STRESS TESTS: No results found for this or any previous visit.     CARDIAC CATH: No results found for this or any previous visit.     DEVICES: No valid procedures specified.   HOLTER: No results found for this or any previous visit.     CT/MRI:  No results found for this or any previous visit.    VASCULAR: No valid procedures specified. "     Assessment and Plan  Diagnoses and all orders for this visit:    1. Primary hypertension (Primary)  Assessment & Plan:  Hypertension is uncontrolled  Medication changes per orders.  Continue current treatment regimen.  Dietary sodium restriction.  Weight loss.  Regular aerobic exercise.  Ambulatory blood pressure monitoring.  Add low-dose hydrochlorothiazide 12.5 mg p.o. daily.  Blood pressure will be reassessedat the next regular appointment/with PCP.    Orders:  -     hydroCHLOROthiazide (MICROZIDE) 12.5 MG capsule; Take 1 capsule by mouth Daily.  Dispense: 90 capsule; Refill: 3  -     Adult Transthoracic Echo Complete W/ Cont if Necessary Per Protocol; Future    2. Mixed hyperlipidemia  Assessment & Plan:   Lipid abnormalities are worsening    Plan:  Continue same medication/s without change.   and Begin taking the following medication/s; fenofibrate 145 mg p.o. daily.      Discussed medication dosage, use, side effects, and goals of treatment in detail.    Counseled patient on lifestyle modifications to help control hyperlipidemia.   Cholesterol lowering dietary information shared with patient.  Advised patient to exercise for 150 minutes weekly. (30 minute brisk walk, 5 days a week for example)  Weight Loss encouraged  Patient counseled in regards to heart healthy, low fat/ low cholesterol/low sat diet, daily exercise for 30 minutes, low to moderate intensity, and weight loss.    Patient Treatment Goals:   LDL goal is less than 70  Triglycerides goal less than 200    Followup in 3 months.    Orders:  -     fenofibrate (TRICOR) 145 MG tablet; Take 1 tablet by mouth Daily.  Dispense: 90 tablet; Refill: 3    3. Palpitations  Assessment & Plan:  Infrequent.  Lasting for hours, regular suggesting SVT.  Plan for Holter monitor 48 hours.  Also plan for transthoracic echocardiogram in view of uncontrolled hypertension and likely SVT.    Orders:  -     Holter Monitor - 48 Hour  -     Adult Transthoracic Echo  Complete W/ Cont if Necessary Per Protocol; Future    4. Chest pain, unspecified type  Assessment & Plan:  Past event of chest pain.  Patient with risk factors: Age for gender, mixed hyperlipidemia, uncontrolled hypertension, past smoking.  Refer patient for a treadmill stress test for diagnostic and reassurance purposes.  Aggressive risk factor modification with BP control and lipid management as above.    Orders:  -     Treadmill Stress Test; Future  -     ECG 12 Lead         Return in about 6 months (around 10/22/2024) for Next scheduled follow up, Follow-up with Dr Alvares.    There are no Patient Instructions on file for this visit.    Josef Alvares MD

## 2024-04-22 NOTE — ASSESSMENT & PLAN NOTE
Lipid abnormalities are worsening    Plan:  Continue same medication/s without change.   and Begin taking the following medication/s; fenofibrate 145 mg p.o. daily.      Discussed medication dosage, use, side effects, and goals of treatment in detail.    Counseled patient on lifestyle modifications to help control hyperlipidemia.   Cholesterol lowering dietary information shared with patient.  Advised patient to exercise for 150 minutes weekly. (30 minute brisk walk, 5 days a week for example)  Weight Loss encouraged  Patient counseled in regards to heart healthy, low fat/ low cholesterol/low sat diet, daily exercise for 30 minutes, low to moderate intensity, and weight loss.    Patient Treatment Goals:   LDL goal is less than 70  Triglycerides goal less than 200    Followup in 3 months.

## 2024-04-22 NOTE — ASSESSMENT & PLAN NOTE
Past event of chest pain.  Patient with risk factors: Age for gender, mixed hyperlipidemia, uncontrolled hypertension, past smoking.  Refer patient for a treadmill stress test for diagnostic and reassurance purposes.  Aggressive risk factor modification with BP control and lipid management as above.

## 2024-04-22 NOTE — TELEPHONE ENCOUNTER
Caller: Linda Good    Relationship: Self    Best call back number: [phone]    What is the best time to reach you: ANYTIME     Who are you requesting to speak with (clinical staff, provider,  specific staff member): CLINICAL STAFF    What was the call regarding: PATIENT IS CALLING IN REGARDS TO THE NEW MEDICATION THAT SHE IS TO BE GETTING. SHE SAYS THAT SHE WAS TO BE STARTED ON A MEDICATION FOR HER TRIGLYCERIDES. THE PHARMACY HAS NOT RECEIVED A NEW MEDICATION AS OF THIS TIME .  SHE IS ALSO WANTING TO SEE IF SHE CAN GET THE NAME OF THE MEDICATION THAT SHE IS TO BE STARTED ON. SHE SAYS THAT SHE IS MEETING WITH ANOTHER PROVIDER TODAY AND WOULD LIKE TO GIVE THEM THE NAME OF IT.     Is it okay if the provider responds through [employer]: YES

## 2024-04-22 NOTE — ASSESSMENT & PLAN NOTE
Infrequent.  Lasting for hours, regular suggesting SVT.  Plan for Holter monitor 48 hours.  Also plan for transthoracic echocardiogram in view of uncontrolled hypertension and likely SVT.

## 2024-04-22 NOTE — ASSESSMENT & PLAN NOTE
Hypertension is uncontrolled  Medication changes per orders.  Continue current treatment regimen.  Dietary sodium restriction.  Weight loss.  Regular aerobic exercise.  Ambulatory blood pressure monitoring.  Add low-dose hydrochlorothiazide 12.5 mg p.o. daily.  Blood pressure will be reassessedat the next regular appointment/with PCP.

## 2024-04-30 ENCOUNTER — TELEPHONE (OUTPATIENT)
Dept: CARDIOLOGY | Facility: CLINIC | Age: 69
End: 2024-04-30

## 2024-04-30 NOTE — TELEPHONE ENCOUNTER
----- Message from Barbara CORONEL sent at 4/30/2024  9:21 AM EDT -----  Please inform patient that her Holter monitor was abnormal.  No events recorded.  It showed too fast heart rate from the upper chamber longest 8 beats, benign, likely asymptomatic since not recorded by button press or an event log.  Medical management.  Thank you!

## 2024-05-14 ENCOUNTER — OFFICE VISIT (OUTPATIENT)
Dept: FAMILY MEDICINE CLINIC | Facility: CLINIC | Age: 69
End: 2024-05-14
Payer: MEDICARE

## 2024-05-14 VITALS
TEMPERATURE: 97.9 F | WEIGHT: 121 LBS | DIASTOLIC BLOOD PRESSURE: 68 MMHG | OXYGEN SATURATION: 99 % | HEIGHT: 63 IN | SYSTOLIC BLOOD PRESSURE: 114 MMHG | BODY MASS INDEX: 21.44 KG/M2 | HEART RATE: 97 BPM

## 2024-05-14 DIAGNOSIS — N30.00 ACUTE CYSTITIS WITHOUT HEMATURIA: Primary | ICD-10-CM

## 2024-05-14 DIAGNOSIS — R50.9 FEVER, UNSPECIFIED FEVER CAUSE: ICD-10-CM

## 2024-05-14 LAB
BILIRUB BLD-MCNC: NEGATIVE MG/DL
CLARITY, POC: CLEAR
COLOR UR: YELLOW
EXPIRATION DATE: ABNORMAL
EXPIRATION DATE: NORMAL
FLUAV AG UPPER RESP QL IA.RAPID: NOT DETECTED
FLUBV AG UPPER RESP QL IA.RAPID: NOT DETECTED
GLUCOSE UR STRIP-MCNC: NEGATIVE MG/DL
INTERNAL CONTROL: NORMAL
KETONES UR QL: NEGATIVE
LEUKOCYTE EST, POC: ABNORMAL
Lab: ABNORMAL
Lab: NORMAL
NITRITE UR-MCNC: NEGATIVE MG/ML
PH UR: 6 [PH] (ref 5–8)
PROT UR STRIP-MCNC: NEGATIVE MG/DL
RBC # UR STRIP: NEGATIVE /UL
SARS-COV-2 AG UPPER RESP QL IA.RAPID: NOT DETECTED
SP GR UR: 1.01 (ref 1–1.03)
UROBILINOGEN UR QL: ABNORMAL

## 2024-05-14 PROCEDURE — 3074F SYST BP LT 130 MM HG: CPT | Performed by: PHYSICIAN ASSISTANT

## 2024-05-14 PROCEDURE — 3078F DIAST BP <80 MM HG: CPT | Performed by: PHYSICIAN ASSISTANT

## 2024-05-14 PROCEDURE — 81003 URINALYSIS AUTO W/O SCOPE: CPT | Performed by: PHYSICIAN ASSISTANT

## 2024-05-14 PROCEDURE — 99213 OFFICE O/P EST LOW 20 MIN: CPT | Performed by: PHYSICIAN ASSISTANT

## 2024-05-14 PROCEDURE — 3044F HG A1C LEVEL LT 7.0%: CPT | Performed by: PHYSICIAN ASSISTANT

## 2024-05-14 PROCEDURE — 87428 SARSCOV & INF VIR A&B AG IA: CPT | Performed by: PHYSICIAN ASSISTANT

## 2024-05-14 PROCEDURE — 1126F AMNT PAIN NOTED NONE PRSNT: CPT | Performed by: PHYSICIAN ASSISTANT

## 2024-05-14 RX ORDER — NITROFURANTOIN 25; 75 MG/1; MG/1
100 CAPSULE ORAL 2 TIMES DAILY
COMMUNITY
Start: 2024-05-11 | End: 2024-05-20

## 2024-05-14 RX ORDER — CEFDINIR 300 MG/1
300 CAPSULE ORAL 2 TIMES DAILY
Qty: 14 CAPSULE | Refills: 0 | Status: SHIPPED | OUTPATIENT
Start: 2024-05-14 | End: 2024-05-25 | Stop reason: HOSPADM

## 2024-05-14 RX ORDER — IBUPROFEN 600 MG/1
600 TABLET ORAL EVERY 6 HOURS PRN
Qty: 45 TABLET | Refills: 1 | Status: SHIPPED | OUTPATIENT
Start: 2024-05-14 | End: 2024-05-25 | Stop reason: HOSPADM

## 2024-05-14 NOTE — PROGRESS NOTES
"Patient Chief Complaint  Fever (Symptoms started Thurs night), Headache, Fatigue, and poor appetite    Subjective          History of Present Illness  Linda Good is here today with fever headache and fatigue    Patient states that starting 4 days ago she started having chills and did not sleep well.  She states that she stayed in bed all day Friday feeling badly. She went to [facility] the next day and was told  there might be something in her urine.  She states that she felt this way when she had a UTI about 10 years ago.  The [facility] placed her on nitrofurantoin.  She states that she still feels weak and that her muscles hurt.  She is not any better and may be a little worse.  She states that she now has headache.  She has been taking Tylenol since the first night of feeling ill.  Patient has not tried ibuprofen and is concerned that it would raise her blood pressure. She has not had a fever that she knows of since starting tylenol      Objective   Vital Signs:   /68   Pulse 97   Temp 97.9 °F (36.6 °C) (Oral)   Ht 160 cm (63\")   Wt 54.9 kg (121 lb)   SpO2 99%   BMI 21.43 kg/m²     Body mass index is 21.43 kg/m².  BMI is within normal parameters. No other follow-up for BMI required.      Review of Systems      Current Outpatient Medications:     fenofibrate (TRICOR) 145 MG tablet, Take 1 tablet by mouth Daily., Disp: 90 tablet, Rfl: 3    rosuvastatin (CRESTOR) 5 MG tablet, Take 1 tablet by mouth Daily., Disp: 90 tablet, Rfl: 0    amLODIPine (NORVASC) 5 MG tablet, Take 1 tablet by mouth Daily for 60 days., Disp: 60 tablet, Rfl: 0    meloxicam (MOBIC) 7.5 MG tablet, Take 1 tablet by mouth Daily for 6 days., Disp: 6 tablet, Rfl: 0    pantoprazole (PROTONIX) 40 MG EC tablet, Take 1 tablet by mouth Every Morning for 6 days., Disp: 6 tablet, Rfl: 0    Thiamine HCl (vitamin B-1) 50 MG tablet, Take 1 tablet by mouth Daily., Disp: , Rfl:     Allergies: Patient has no known allergies.    Physical Exam  Vitals and " nursing note reviewed.   Constitutional:       General: She is not in acute distress.     Appearance: Normal appearance. She is ill-appearing. She is not toxic-appearing or diaphoretic.   HENT:      Head: Normocephalic and atraumatic.      Right Ear: Tympanic membrane, ear canal and external ear normal.      Left Ear: Tympanic membrane, ear canal and external ear normal.      Nose: Nose normal.      Mouth/Throat:      Mouth: Mucous membranes are moist.   Eyes:      Pupils: Pupils are equal, round, and reactive to light.   Cardiovascular:      Heart sounds: Normal heart sounds.   Pulmonary:      Effort: Pulmonary effort is normal.      Breath sounds: Normal breath sounds.   Abdominal:      Tenderness: There is no right CVA tenderness or left CVA tenderness.   Neurological:      General: No focal deficit present.      Mental Status: She is alert.   Psychiatric:         Mood and Affect: Mood normal.         Behavior: Behavior normal.          Result Review :                   Assessment and Plan    Diagnoses and all orders for this visit:    1. Acute cystitis without hematuria (Primary)  Discussed with patient that her flu and COVID tests are negative today.  Her urinalysis showed trace leukocytes.  As nitrofurantoin is a bacteriostatic rather than bacteriocidal will switch her antibiotic to Omnicef to better cover the infection.  We discussed with patient that her symptoms are not improving would have her either come back to our office or be seen in the ED for further evaluation.  Will also have her try ibuprofen to help with aches and pains as well.      2. Fever, unspecified fever cause  -     POC Urinalysis Dipstick, Automated  -     Urine Culture - Urine, Urine, Clean Catch  -     POCT SARS-CoV-2 + Flu Antigen ERIKA          Follow Up   Return in about 2 months (around 7/14/2024) for Annual with ehr PCP.  Patient was given instructions and counseling regarding her condition or for health maintenance advice. Please  see specific information pulled into the AVS if appropriate.     GRACE Hodges  05/14/2024

## 2024-05-17 ENCOUNTER — PATIENT MESSAGE (OUTPATIENT)
Dept: FAMILY MEDICINE CLINIC | Facility: CLINIC | Age: 69
End: 2024-05-17
Payer: MEDICARE

## 2024-05-17 LAB
BACTERIA UR CULT: NORMAL
BACTERIA UR CULT: NORMAL

## 2024-05-17 NOTE — PROGRESS NOTES
Please call patient and let her know the urine culture we performed was negative for UTI- please know that she was already taking antibiotics when we got the samples so may not be accurate. How is she feeling?

## 2024-05-20 ENCOUNTER — OFFICE VISIT (OUTPATIENT)
Dept: FAMILY MEDICINE CLINIC | Facility: CLINIC | Age: 69
End: 2024-05-20
Payer: MEDICARE

## 2024-05-20 ENCOUNTER — HOSPITAL ENCOUNTER (INPATIENT)
Facility: HOSPITAL | Age: 69
LOS: 3 days | Discharge: HOME | DRG: 871 | End: 2024-05-25
Attending: EMERGENCY MEDICINE | Admitting: FAMILY MEDICINE
Payer: MEDICARE

## 2024-05-20 ENCOUNTER — APPOINTMENT (OUTPATIENT)
Dept: CT IMAGING | Facility: HOSPITAL | Age: 69
DRG: 871 | End: 2024-05-20
Payer: MEDICARE

## 2024-05-20 ENCOUNTER — APPOINTMENT (OUTPATIENT)
Dept: GENERAL RADIOLOGY | Facility: HOSPITAL | Age: 69
DRG: 871 | End: 2024-05-20
Payer: MEDICARE

## 2024-05-20 VITALS
DIASTOLIC BLOOD PRESSURE: 70 MMHG | HEART RATE: 102 BPM | BODY MASS INDEX: 20.91 KG/M2 | WEIGHT: 118 LBS | HEIGHT: 63 IN | OXYGEN SATURATION: 100 % | SYSTOLIC BLOOD PRESSURE: 100 MMHG

## 2024-05-20 DIAGNOSIS — E78.2 MIXED HYPERLIPIDEMIA: ICD-10-CM

## 2024-05-20 DIAGNOSIS — I10 PRIMARY HYPERTENSION: ICD-10-CM

## 2024-05-20 DIAGNOSIS — Z86.19 HISTORY OF HISTOPLASMOSIS: ICD-10-CM

## 2024-05-20 DIAGNOSIS — R50.9 FEVER IN ADULT: Primary | ICD-10-CM

## 2024-05-20 DIAGNOSIS — R65.10 SIRS (SYSTEMIC INFLAMMATORY RESPONSE SYNDROME): Primary | ICD-10-CM

## 2024-05-20 DIAGNOSIS — D72.829 LEUKOCYTOSIS, UNSPECIFIED TYPE: ICD-10-CM

## 2024-05-20 DIAGNOSIS — R51.9 HEADACHE, UNSPECIFIED HEADACHE TYPE: ICD-10-CM

## 2024-05-20 DIAGNOSIS — R50.81 FEVER IN OTHER DISEASES: ICD-10-CM

## 2024-05-20 DIAGNOSIS — H25.813 COMBINED FORMS OF AGE-RELATED CATARACT OF BOTH EYES: ICD-10-CM

## 2024-05-20 DIAGNOSIS — N17.9 AKI (ACUTE KIDNEY INJURY): ICD-10-CM

## 2024-05-20 DIAGNOSIS — D72.825 BANDEMIA: ICD-10-CM

## 2024-05-20 DIAGNOSIS — R00.2 PALPITATIONS: ICD-10-CM

## 2024-05-20 LAB
ALBUMIN SERPL-MCNC: 3.9 G/DL (ref 3.5–5.2)
ALBUMIN/GLOB SERPL: 1.1 G/DL
ALP SERPL-CCNC: 115 U/L (ref 39–117)
ALT SERPL W P-5'-P-CCNC: 50 U/L (ref 1–33)
ANION GAP SERPL CALCULATED.3IONS-SCNC: 18 MMOL/L (ref 5–15)
APPEARANCE CSF: ABNORMAL
AST SERPL-CCNC: 26 U/L (ref 1–32)
B PARAPERT DNA SPEC QL NAA+PROBE: NOT DETECTED
B PERT DNA SPEC QL NAA+PROBE: NOT DETECTED
BACTERIA UR QL AUTO: ABNORMAL /HPF
BASOPHILS # BLD MANUAL: 0 10*3/MM3 (ref 0–0.2)
BASOPHILS NFR BLD MANUAL: 0 % (ref 0–1.5)
BILIRUB SERPL-MCNC: 0.2 MG/DL (ref 0–1.2)
BILIRUB UR QL STRIP: ABNORMAL
BUN SERPL-MCNC: 36 MG/DL (ref 8–23)
BUN/CREAT SERPL: 18 (ref 7–25)
C GATTII+NEOFOR DNA CSF QL NAA+NON-PROBE: NOT DETECTED
C PNEUM DNA NPH QL NAA+NON-PROBE: NOT DETECTED
CALCIUM SPEC-SCNC: 9.2 MG/DL (ref 8.6–10.5)
CHLORIDE SERPL-SCNC: 92 MMOL/L (ref 98–107)
CLARITY UR: ABNORMAL
CLUMPED PLATELETS: PRESENT
CMV DNA CSF QL NAA+PROBE: NOT DETECTED
CO2 SERPL-SCNC: 22 MMOL/L (ref 22–29)
COLOR CSF: ABNORMAL
COLOR SPUN CSF: COLORLESS
COLOR UR: ABNORMAL
CREAT SERPL-MCNC: 2 MG/DL (ref 0.57–1)
D-LACTATE SERPL-SCNC: 1.8 MMOL/L (ref 0.5–2)
DEPRECATED RDW RBC AUTO: 46.9 FL (ref 37–54)
E COLI K1 DNA CSF QL NAA+NON-PROBE: NOT DETECTED
EGFRCR SERPLBLD CKD-EPI 2021: 26.9 ML/MIN/1.73
EOSINOPHIL # BLD MANUAL: 0 10*3/MM3 (ref 0–0.4)
EOSINOPHIL NFR BLD MANUAL: 0 % (ref 0.3–6.2)
EOSINOPHIL NFR CSF MANUAL: 2 %
ERYTHROCYTE [DISTWIDTH] IN BLOOD BY AUTOMATED COUNT: 12.9 % (ref 12.3–15.4)
EV RNA CSF QL NAA+PROBE: NOT DETECTED
FLUAV SUBTYP SPEC NAA+PROBE: NOT DETECTED
FLUBV RNA ISLT QL NAA+PROBE: NOT DETECTED
GLOBULIN UR ELPH-MCNC: 3.5 GM/DL
GLUCOSE CSF-MCNC: 64 MG/DL (ref 40–70)
GLUCOSE SERPL-MCNC: 118 MG/DL (ref 65–99)
GLUCOSE UR STRIP-MCNC: ABNORMAL MG/DL
GP B STREP DNA SPEC QL NAA+PROBE: NOT DETECTED
HADV DNA SPEC NAA+PROBE: NOT DETECTED
HAEM INFLU SEROTYP DNA SPEC NAA+PROBE: NOT DETECTED
HCOV 229E RNA SPEC QL NAA+PROBE: NOT DETECTED
HCOV HKU1 RNA SPEC QL NAA+PROBE: NOT DETECTED
HCOV NL63 RNA SPEC QL NAA+PROBE: NOT DETECTED
HCOV OC43 RNA SPEC QL NAA+PROBE: NOT DETECTED
HCT VFR BLD AUTO: 37.9 % (ref 34–46.6)
HETEROPH AB SER QL LA: NEGATIVE
HGB BLD-MCNC: 12.2 G/DL (ref 12–15.9)
HGB UR QL STRIP.AUTO: NEGATIVE
HHV6 DNA CSF QL NAA+PROBE: NOT DETECTED
HMPV RNA NPH QL NAA+NON-PROBE: NOT DETECTED
HOLD SPECIMEN: NORMAL
HPIV1 RNA ISLT QL NAA+PROBE: NOT DETECTED
HPIV2 RNA SPEC QL NAA+PROBE: NOT DETECTED
HPIV3 RNA NPH QL NAA+PROBE: NOT DETECTED
HPIV4 P GENE NPH QL NAA+PROBE: NOT DETECTED
HSV1 DNA CSF QL NAA+PROBE: NOT DETECTED
HSV2 DNA CSF QL NAA+PROBE: NOT DETECTED
HYALINE CASTS UR QL AUTO: ABNORMAL /LPF
KETONES UR QL STRIP: ABNORMAL
L MONOCYTOG RRNA SPEC QL PROBE: NOT DETECTED
LARGE PLATELETS: ABNORMAL
LEUKOCYTE ESTERASE UR QL STRIP.AUTO: ABNORMAL
LYMPHOCYTES # BLD MANUAL: 1.54 10*3/MM3 (ref 0.7–3.1)
LYMPHOCYTES NFR BLD MANUAL: 3 % (ref 5–12)
LYMPHOCYTES NFR CSF MANUAL: 23 % (ref 62–100)
M PNEUMO IGG SER IA-ACNC: NOT DETECTED
MACROPHAGES NFR FLD: 9 %
MAGNESIUM SERPL-MCNC: 2.1 MG/DL (ref 1.6–2.4)
MCH RBC QN AUTO: 31.9 PG (ref 26.6–33)
MCHC RBC AUTO-ENTMCNC: 32.2 G/DL (ref 31.5–35.7)
MCV RBC AUTO: 99 FL (ref 79–97)
MONOCYTES # BLD: 0.42 10*3/MM3 (ref 0.1–0.9)
N MEN DNA SPEC QL NAA+PROBE: NOT DETECTED
NEUTROPHILS # BLD AUTO: 12.03 10*3/MM3 (ref 1.7–7)
NEUTROPHILS NFR BLD MANUAL: 46 % (ref 42.7–76)
NEUTROPHILS NFR CSF MICRO: 66 % (ref 0–6)
NEUTS BAND NFR BLD MANUAL: 40 % (ref 0–5)
NEUTS VAC BLD QL SMEAR: ABNORMAL
NITRITE UR QL STRIP: NEGATIVE
PARECHOVIRUS A RNA CSF QL NAA+NON-PROBE: NOT DETECTED
PH UR STRIP.AUTO: <=5 [PH] (ref 5–8)
PLATELET # BLD AUTO: 609 10*3/MM3 (ref 140–450)
PMV BLD AUTO: 9.5 FL (ref 6–12)
POTASSIUM SERPL-SCNC: 4.2 MMOL/L (ref 3.5–5.2)
PROCALCITONIN SERPL-MCNC: 1.89 NG/ML (ref 0–0.25)
PROT CSF-MCNC: 95.3 MG/DL (ref 15–45)
PROT SERPL-MCNC: 7.4 G/DL (ref 6–8.5)
PROT UR QL STRIP: ABNORMAL
QT INTERVAL: 322 MS
QTC INTERVAL: 419 MS
RBC # BLD AUTO: 3.83 10*6/MM3 (ref 3.77–5.28)
RBC # CSF MANUAL: ABNORMAL /MM3 (ref 0–5)
RBC # UR STRIP: ABNORMAL /HPF
RBC MORPH BLD: NORMAL
REF LAB TEST METHOD: ABNORMAL
RHINOVIRUS RNA SPEC NAA+PROBE: NOT DETECTED
RSV RNA NPH QL NAA+NON-PROBE: NOT DETECTED
S PNEUM DNA CSF QL NAA+NON-PROBE: NOT DETECTED
SARS-COV-2 RNA NPH QL NAA+NON-PROBE: NOT DETECTED
SMALL PLATELETS BLD QL SMEAR: ABNORMAL
SODIUM SERPL-SCNC: 132 MMOL/L (ref 136–145)
SP GR UR STRIP: 1.02 (ref 1–1.03)
SPECIMEN VOL CSF: 10.3 ML
SQUAMOUS #/AREA URNS HPF: ABNORMAL /HPF
T4 FREE SERPL-MCNC: 1.25 NG/DL (ref 0.92–1.68)
TSH SERPL DL<=0.05 MIU/L-ACNC: 2.02 UIU/ML (ref 0.27–4.2)
TUBE # CSF: 4
UROBILINOGEN UR QL STRIP: ABNORMAL
VARIANT LYMPHS NFR BLD MANUAL: 2 % (ref 0–5)
VARIANT LYMPHS NFR BLD MANUAL: 9 % (ref 19.6–45.3)
VZV DNA CSF QL NAA+PROBE: NOT DETECTED
WBC # CSF MANUAL: 29 /MM3 (ref 0–5)
WBC # UR STRIP: ABNORMAL /HPF
WBC NRBC COR # BLD AUTO: 13.99 10*3/MM3 (ref 3.4–10.8)

## 2024-05-20 PROCEDURE — 99222 1ST HOSP IP/OBS MODERATE 55: CPT | Performed by: INTERNAL MEDICINE

## 2024-05-20 PROCEDURE — 82945 GLUCOSE OTHER FLUID: CPT | Performed by: EMERGENCY MEDICINE

## 2024-05-20 PROCEDURE — 87015 SPECIMEN INFECT AGNT CONCNTJ: CPT | Performed by: EMERGENCY MEDICINE

## 2024-05-20 PROCEDURE — 81001 URINALYSIS AUTO W/SCOPE: CPT | Performed by: EMERGENCY MEDICINE

## 2024-05-20 PROCEDURE — 009U3ZX DRAINAGE OF SPINAL CANAL, PERCUTANEOUS APPROACH, DIAGNOSTIC: ICD-10-PCS | Performed by: RADIOLOGY

## 2024-05-20 PROCEDURE — G0378 HOSPITAL OBSERVATION PER HR: HCPCS

## 2024-05-20 PROCEDURE — 1160F RVW MEDS BY RX/DR IN RCRD: CPT | Performed by: NURSE PRACTITIONER

## 2024-05-20 PROCEDURE — 84157 ASSAY OF PROTEIN OTHER: CPT | Performed by: EMERGENCY MEDICINE

## 2024-05-20 PROCEDURE — 0202U NFCT DS 22 TRGT SARS-COV-2: CPT | Performed by: EMERGENCY MEDICINE

## 2024-05-20 PROCEDURE — 99285 EMERGENCY DEPT VISIT HI MDM: CPT

## 2024-05-20 PROCEDURE — 84443 ASSAY THYROID STIM HORMONE: CPT | Performed by: EMERGENCY MEDICINE

## 2024-05-20 PROCEDURE — 84439 ASSAY OF FREE THYROXINE: CPT | Performed by: EMERGENCY MEDICINE

## 2024-05-20 PROCEDURE — 1159F MED LIST DOCD IN RCRD: CPT | Performed by: NURSE PRACTITIONER

## 2024-05-20 PROCEDURE — 25010000002 CEFTRIAXONE PER 250 MG: Performed by: EMERGENCY MEDICINE

## 2024-05-20 PROCEDURE — 87086 URINE CULTURE/COLONY COUNT: CPT | Performed by: EMERGENCY MEDICINE

## 2024-05-20 PROCEDURE — 62328 DX LMBR SPI PNXR W/FLUOR/CT: CPT | Performed by: RADIOLOGY

## 2024-05-20 PROCEDURE — 99214 OFFICE O/P EST MOD 30 MIN: CPT | Performed by: NURSE PRACTITIONER

## 2024-05-20 PROCEDURE — 93005 ELECTROCARDIOGRAM TRACING: CPT | Performed by: EMERGENCY MEDICINE

## 2024-05-20 PROCEDURE — 89051 BODY FLUID CELL COUNT: CPT | Performed by: EMERGENCY MEDICINE

## 2024-05-20 PROCEDURE — 3078F DIAST BP <80 MM HG: CPT | Performed by: NURSE PRACTITIONER

## 2024-05-20 PROCEDURE — 3074F SYST BP LT 130 MM HG: CPT | Performed by: NURSE PRACTITIONER

## 2024-05-20 PROCEDURE — 71045 X-RAY EXAM CHEST 1 VIEW: CPT

## 2024-05-20 PROCEDURE — 25810000003 SODIUM CHLORIDE 0.9 % SOLUTION: Performed by: INTERNAL MEDICINE

## 2024-05-20 PROCEDURE — 70450 CT HEAD/BRAIN W/O DYE: CPT

## 2024-05-20 PROCEDURE — 25810000003 SODIUM CHLORIDE 0.9 % SOLUTION: Performed by: EMERGENCY MEDICINE

## 2024-05-20 PROCEDURE — 87483 CNS DNA AMP PROBE TYPE 12-25: CPT | Performed by: EMERGENCY MEDICINE

## 2024-05-20 PROCEDURE — 83605 ASSAY OF LACTIC ACID: CPT | Performed by: EMERGENCY MEDICINE

## 2024-05-20 PROCEDURE — 85007 BL SMEAR W/DIFF WBC COUNT: CPT | Performed by: EMERGENCY MEDICINE

## 2024-05-20 PROCEDURE — B01B1ZZ FLUOROSCOPY OF SPINAL CORD USING LOW OSMOLAR CONTRAST: ICD-10-PCS | Performed by: RADIOLOGY

## 2024-05-20 PROCEDURE — 84145 PROCALCITONIN (PCT): CPT | Performed by: EMERGENCY MEDICINE

## 2024-05-20 PROCEDURE — 86308 HETEROPHILE ANTIBODY SCREEN: CPT | Performed by: EMERGENCY MEDICINE

## 2024-05-20 PROCEDURE — 1126F AMNT PAIN NOTED NONE PRSNT: CPT | Performed by: NURSE PRACTITIONER

## 2024-05-20 PROCEDURE — 80053 COMPREHEN METABOLIC PANEL: CPT | Performed by: EMERGENCY MEDICINE

## 2024-05-20 PROCEDURE — 83735 ASSAY OF MAGNESIUM: CPT | Performed by: EMERGENCY MEDICINE

## 2024-05-20 PROCEDURE — 85025 COMPLETE CBC W/AUTO DIFF WBC: CPT | Performed by: EMERGENCY MEDICINE

## 2024-05-20 PROCEDURE — 87070 CULTURE OTHR SPECIMN AEROBIC: CPT | Performed by: EMERGENCY MEDICINE

## 2024-05-20 PROCEDURE — 87205 SMEAR GRAM STAIN: CPT | Performed by: EMERGENCY MEDICINE

## 2024-05-20 PROCEDURE — 87040 BLOOD CULTURE FOR BACTERIA: CPT | Performed by: EMERGENCY MEDICINE

## 2024-05-20 RX ORDER — BISACODYL 10 MG
10 SUPPOSITORY, RECTAL RECTAL DAILY PRN
Status: DISCONTINUED | OUTPATIENT
Start: 2024-05-20 | End: 2024-05-25 | Stop reason: HOSPADM

## 2024-05-20 RX ORDER — SODIUM CHLORIDE 9 MG/ML
50 INJECTION, SOLUTION INTRAVENOUS CONTINUOUS
Status: DISCONTINUED | OUTPATIENT
Start: 2024-05-20 | End: 2024-05-25 | Stop reason: HOSPADM

## 2024-05-20 RX ORDER — AMOXICILLIN 250 MG
2 CAPSULE ORAL 2 TIMES DAILY PRN
Status: DISCONTINUED | OUTPATIENT
Start: 2024-05-20 | End: 2024-05-25 | Stop reason: HOSPADM

## 2024-05-20 RX ORDER — ACETAMINOPHEN 325 MG/1
650 TABLET ORAL EVERY 4 HOURS PRN
Status: DISCONTINUED | OUTPATIENT
Start: 2024-05-20 | End: 2024-05-21

## 2024-05-20 RX ORDER — BISACODYL 5 MG/1
5 TABLET, DELAYED RELEASE ORAL DAILY PRN
Status: DISCONTINUED | OUTPATIENT
Start: 2024-05-20 | End: 2024-05-25 | Stop reason: HOSPADM

## 2024-05-20 RX ORDER — POLYETHYLENE GLYCOL 3350 17 G/17G
17 POWDER, FOR SOLUTION ORAL DAILY PRN
Status: DISCONTINUED | OUTPATIENT
Start: 2024-05-20 | End: 2024-05-25 | Stop reason: HOSPADM

## 2024-05-20 RX ORDER — LIDOCAINE HYDROCHLORIDE AND EPINEPHRINE 10; 10 MG/ML; UG/ML
10 INJECTION, SOLUTION INFILTRATION; PERINEURAL ONCE
Status: COMPLETED | OUTPATIENT
Start: 2024-05-20 | End: 2024-05-20

## 2024-05-20 RX ORDER — SODIUM CHLORIDE 9 MG/ML
40 INJECTION, SOLUTION INTRAVENOUS AS NEEDED
Status: DISCONTINUED | OUTPATIENT
Start: 2024-05-20 | End: 2024-05-25 | Stop reason: HOSPADM

## 2024-05-20 RX ORDER — TEMAZEPAM 15 MG/1
15 CAPSULE ORAL NIGHTLY PRN
Status: DISCONTINUED | OUTPATIENT
Start: 2024-05-20 | End: 2024-05-25 | Stop reason: HOSPADM

## 2024-05-20 RX ORDER — ONDANSETRON 4 MG/1
4 TABLET, ORALLY DISINTEGRATING ORAL EVERY 6 HOURS PRN
Status: DISCONTINUED | OUTPATIENT
Start: 2024-05-20 | End: 2024-05-25 | Stop reason: HOSPADM

## 2024-05-20 RX ORDER — LIDOCAINE HYDROCHLORIDE 10 MG/ML
INJECTION, SOLUTION EPIDURAL; INFILTRATION; INTRACAUDAL; PERINEURAL
Status: DISCONTINUED | OUTPATIENT
Start: 2024-05-20 | End: 2024-05-20 | Stop reason: HOSPADM

## 2024-05-20 RX ORDER — SODIUM CHLORIDE 0.9 % (FLUSH) 0.9 %
10 SYRINGE (ML) INJECTION EVERY 12 HOURS SCHEDULED
Status: DISCONTINUED | OUTPATIENT
Start: 2024-05-20 | End: 2024-05-25 | Stop reason: HOSPADM

## 2024-05-20 RX ORDER — HEPARIN SODIUM 5000 [USP'U]/ML
5000 INJECTION, SOLUTION INTRAVENOUS; SUBCUTANEOUS EVERY 8 HOURS SCHEDULED
Status: DISCONTINUED | OUTPATIENT
Start: 2024-05-20 | End: 2024-05-25 | Stop reason: HOSPADM

## 2024-05-20 RX ORDER — ONDANSETRON 2 MG/ML
4 INJECTION INTRAMUSCULAR; INTRAVENOUS EVERY 6 HOURS PRN
Status: DISCONTINUED | OUTPATIENT
Start: 2024-05-20 | End: 2024-05-25 | Stop reason: HOSPADM

## 2024-05-20 RX ORDER — THIAMINE HCL 50 MG
50 TABLET ORAL DAILY
COMMUNITY

## 2024-05-20 RX ORDER — PHENOL 1.4 %
600 AEROSOL, SPRAY (ML) MUCOUS MEMBRANE DAILY
COMMUNITY
End: 2024-05-25 | Stop reason: HOSPADM

## 2024-05-20 RX ORDER — VITAMIN E 268 MG
400 CAPSULE ORAL DAILY
COMMUNITY
End: 2024-05-25 | Stop reason: HOSPADM

## 2024-05-20 RX ORDER — ROSUVASTATIN CALCIUM 10 MG/1
5 TABLET, COATED ORAL NIGHTLY
Status: DISCONTINUED | OUTPATIENT
Start: 2024-05-20 | End: 2024-05-25 | Stop reason: HOSPADM

## 2024-05-20 RX ORDER — MULTIVIT WITH MINERALS/LUTEIN
250 TABLET ORAL DAILY
COMMUNITY
End: 2024-05-25 | Stop reason: HOSPADM

## 2024-05-20 RX ORDER — SODIUM CHLORIDE 0.9 % (FLUSH) 0.9 %
10 SYRINGE (ML) INJECTION AS NEEDED
Status: DISCONTINUED | OUTPATIENT
Start: 2024-05-20 | End: 2024-05-25 | Stop reason: HOSPADM

## 2024-05-20 RX ADMIN — SODIUM CHLORIDE 1000 ML: 9 INJECTION, SOLUTION INTRAVENOUS at 16:17

## 2024-05-20 RX ADMIN — SODIUM CHLORIDE 75 ML/HR: 9 INJECTION, SOLUTION INTRAVENOUS at 23:50

## 2024-05-20 RX ADMIN — Medication 10 ML: at 23:50

## 2024-05-20 RX ADMIN — SODIUM CHLORIDE 2000 MG: 900 INJECTION INTRAVENOUS at 20:26

## 2024-05-20 RX ADMIN — ROSUVASTATIN 5 MG: 10 TABLET, FILM COATED ORAL at 23:50

## 2024-05-20 RX ADMIN — SODIUM CHLORIDE 1000 ML: 9 INJECTION, SOLUTION INTRAVENOUS at 20:22

## 2024-05-20 RX ADMIN — LIDOCAINE HYDROCHLORIDE,EPINEPHRINE BITARTRATE 10 ML: 10; .01 INJECTION, SOLUTION INFILTRATION; PERINEURAL at 17:00

## 2024-05-20 NOTE — BRIEF OP NOTE
CV FLUOROSCOPY GUIDED LUMBAR PUNCTURE DIAGNOSTIC  Progress Note    Linda Good  5/20/2024    Pre-op Diagnosis:   Fever, headache       Post-Op Diagnosis Codes:  Fever, headache    Procedure/CPT® Codes:        Procedure(s):  IR fluoroscopy guided lumbar puncture diagnostic              Surgeon(s):  Epi Blackburn MD    Anesthesia: * No anesthesia type entered *    Staff:   Documenter: Anuradha, Priti, RN  Invasive Nurse: Kenyetta Hernandez RN         Estimated Blood Loss: minimal    Urine Voided: * No values recorded between 5/20/2024  6:22 PM and 5/20/2024  6:56 PM *    Specimens:                CSF          Drains: * No LDAs found *    Findings: A fluoroscopic guided lumbar puncture was performed with an 18-gauge spinal needle inserted into the thecal sac of the mid lumbar region.  Bloody CSF was encountered, with an opening pressure less than 15 cm of water (prone).  10 cc of blood-tinged CSF was drawn off into 4 tubes, with specimen sent for culture/labs (per referring service).      Complications: None apparent.          Epi Blackburn MD     Date: 5/20/2024  Time: 18:58 EDT

## 2024-05-20 NOTE — PROGRESS NOTES
"Chief Complaint  Urinary Tract Infection (Saw [facility] last Saturday for UTI, saw Yomaira Tuesday since no imp. Abx switched)    Subjective          Linda Good presents to [facility]  History of Present Illness  Pt began having headache and fever 10 days ago. She was seen at [facility] and diagnosed with a UTI and given Macrobid. She continued to have fever and headache. She was seen at our office last week and changed to Cefdinir. She states her headache worsened yesterday and she had fever of 101. She denies cough, congestion, sinus pressure, sore throat, dysuria, or known sick contacts.   Urinary Tract Infection   Associated symptoms include chills.       Objective   Vital Signs:   /70   Pulse 102   Ht 160 cm (63\")   Wt 53.5 kg (118 lb)   SpO2 100%   BMI 20.90 kg/m²     Body mass index is 20.9 kg/m².    Review of Systems   Constitutional:  Positive for chills, fatigue and fever.   HENT:  Negative for congestion, sinus pressure and sore throat.    Eyes:  Negative for visual disturbance.   Respiratory:  Negative for cough and shortness of breath.    Cardiovascular:  Negative for chest pain, palpitations and leg swelling.   Neurological:  Negative for syncope.   Psychiatric/Behavioral:  The patient is not nervous/anxious.           Current Outpatient Medications:     cefdinir (OMNICEF) 300 MG capsule, Take 1 capsule by mouth 2 (Two) Times a Day for 7 days., Disp: 14 capsule, Rfl: 0    fenofibrate (TRICOR) 145 MG tablet, Take 1 tablet by mouth Daily., Disp: 90 tablet, Rfl: 3    ibuprofen (ADVIL,MOTRIN) 600 MG tablet, Take 1 tablet by mouth Every 6 (Six) Hours As Needed for Moderate Pain., Disp: 45 tablet, Rfl: 1    hydroCHLOROthiazide (MICROZIDE) 12.5 MG capsule, Take 1 capsule by mouth Daily., Disp: 90 capsule, Rfl: 3    lisinopril (PRINIVIL,ZESTRIL) 40 MG tablet, Take 1 tablet by mouth Daily., Disp: 90 tablet, Rfl: 0    nitrofurantoin, macrocrystal-monohydrate, (MACROBID) 100 MG " capsule, Take 1 capsule by mouth 2 (Two) Times a Day., Disp: , Rfl:     rosuvastatin (CRESTOR) 5 MG tablet, Take 1 tablet by mouth Daily., Disp: 90 tablet, Rfl: 0      Allergies: Patient has no known allergies.    Physical Exam  Constitutional:       Appearance: Normal appearance.   HENT:      Head: Normocephalic.   Eyes:      Conjunctiva/sclera: Conjunctivae normal.      Pupils: Pupils are equal, round, and reactive to light.   Cardiovascular:      Rate and Rhythm: Normal rate and regular rhythm.      Heart sounds: Normal heart sounds.   Pulmonary:      Effort: Pulmonary effort is normal.      Breath sounds: Normal breath sounds.   Abdominal:      Tenderness: There is no abdominal tenderness.   Musculoskeletal:         General: Normal range of motion.   Skin:     General: Skin is warm and dry.      Capillary Refill: Capillary refill takes less than 2 seconds.   Neurological:      General: No focal deficit present.      Mental Status: She is alert and oriented to person, place, and time.   Psychiatric:         Mood and Affect: Mood normal.         Behavior: Behavior normal.         Thought Content: Thought content normal.         Judgment: Judgment normal.          Result Review :                   Assessment and Plan    Diagnoses and all orders for this visit:    1. Fever in adult (Primary)  Comments:  I advised ER evaluation to rule out meningitis. She agreed and will go directed to [facility] ER. I discussed care with NP in ER.      I spent 30 minutes caring for Linda on this date of service. This time includes time spent by me in the following activities:preparing for the visit, reviewing tests, referring and communicating with other health care professionals , documenting information in the medical record, and care coordination          Follow Up   No follow-ups on file.  Patient was given instructions and counseling regarding her condition or for health maintenance advice. Please see specific information pulled  into the AVS if appropriate.     Lvi Hernandez, APRN

## 2024-05-21 ENCOUNTER — APPOINTMENT (OUTPATIENT)
Dept: CARDIOLOGY | Facility: HOSPITAL | Age: 69
DRG: 871 | End: 2024-05-21
Payer: MEDICARE

## 2024-05-21 LAB
ALBUMIN SERPL-MCNC: 3.4 G/DL (ref 3.5–5.2)
ALBUMIN/GLOB SERPL: 1.6 G/DL
ALP SERPL-CCNC: 92 U/L (ref 39–117)
ALT SERPL W P-5'-P-CCNC: 34 U/L (ref 1–33)
ANION GAP SERPL CALCULATED.3IONS-SCNC: 10 MMOL/L (ref 5–15)
AST SERPL-CCNC: 19 U/L (ref 1–32)
BASOPHILS # BLD MANUAL: 0 10*3/MM3 (ref 0–0.2)
BASOPHILS NFR BLD MANUAL: 0 % (ref 0–1.5)
BH CV ECHO MEAS - AO MAX PG: 10.8 MMHG
BH CV ECHO MEAS - AO MEAN PG: 6 MMHG
BH CV ECHO MEAS - AO ROOT DIAM: 3.2 CM
BH CV ECHO MEAS - AO V2 MAX: 163.5 CM/SEC
BH CV ECHO MEAS - AO V2 VTI: 29.7 CM
BH CV ECHO MEAS - AVA(I,D): 2.5 CM2
BH CV ECHO MEAS - EDV(CUBED): 74.1 ML
BH CV ECHO MEAS - EDV(MOD-SP2): 50.5 ML
BH CV ECHO MEAS - EDV(MOD-SP4): 67.9 ML
BH CV ECHO MEAS - EF(MOD-BP): 62.1 %
BH CV ECHO MEAS - EF(MOD-SP2): 60.6 %
BH CV ECHO MEAS - EF(MOD-SP4): 69.4 %
BH CV ECHO MEAS - ESV(CUBED): 14.7 ML
BH CV ECHO MEAS - ESV(MOD-SP2): 19.9 ML
BH CV ECHO MEAS - ESV(MOD-SP4): 20.8 ML
BH CV ECHO MEAS - FS: 41.7 %
BH CV ECHO MEAS - IVS/LVPW: 0.71 CM
BH CV ECHO MEAS - IVSD: 0.75 CM
BH CV ECHO MEAS - LA DIMENSION: 3.4 CM
BH CV ECHO MEAS - LAT PEAK E' VEL: 9.5 CM/SEC
BH CV ECHO MEAS - LV DIASTOLIC VOL/BSA (35-75): 44.1 CM2
BH CV ECHO MEAS - LV MASS(C)D: 118.7 GRAMS
BH CV ECHO MEAS - LV MAX PG: 6.2 MMHG
BH CV ECHO MEAS - LV MEAN PG: 3.5 MMHG
BH CV ECHO MEAS - LV SYSTOLIC VOL/BSA (12-30): 13.5 CM2
BH CV ECHO MEAS - LV V1 MAX: 124 CM/SEC
BH CV ECHO MEAS - LV V1 VTI: 23.9 CM
BH CV ECHO MEAS - LVIDD: 4.2 CM
BH CV ECHO MEAS - LVIDS: 2.45 CM
BH CV ECHO MEAS - LVOT AREA: 3.1 CM2
BH CV ECHO MEAS - LVOT DIAM: 2 CM
BH CV ECHO MEAS - LVPWD: 1.05 CM
BH CV ECHO MEAS - MED PEAK E' VEL: 8.5 CM/SEC
BH CV ECHO MEAS - MV A MAX VEL: 144 CM/SEC
BH CV ECHO MEAS - MV DEC SLOPE: 1022 CM/SEC2
BH CV ECHO MEAS - MV DEC TIME: 0.18 SEC
BH CV ECHO MEAS - MV E MAX VEL: 120 CM/SEC
BH CV ECHO MEAS - MV E/A: 0.83
BH CV ECHO MEAS - MV MAX PG: 10.1 MMHG
BH CV ECHO MEAS - MV MEAN PG: 5 MMHG
BH CV ECHO MEAS - MV P1/2T: 47 MSEC
BH CV ECHO MEAS - MV V2 VTI: 40.4 CM
BH CV ECHO MEAS - MVA(P1/2T): 4.7 CM2
BH CV ECHO MEAS - MVA(VTI): 1.86 CM2
BH CV ECHO MEAS - PA ACC TIME: 0.1 SEC
BH CV ECHO MEAS - PA V2 MAX: 113 CM/SEC
BH CV ECHO MEAS - RAP SYSTOLE: 3 MMHG
BH CV ECHO MEAS - RVSP: 34 MMHG
BH CV ECHO MEAS - SV(LVOT): 75.1 ML
BH CV ECHO MEAS - SV(MOD-SP2): 30.6 ML
BH CV ECHO MEAS - SV(MOD-SP4): 47.1 ML
BH CV ECHO MEAS - SVI(LVOT): 48.8 ML/M2
BH CV ECHO MEAS - SVI(MOD-SP2): 19.9 ML/M2
BH CV ECHO MEAS - SVI(MOD-SP4): 30.6 ML/M2
BH CV ECHO MEAS - TAPSE (>1.6): 2.9 CM
BH CV ECHO MEAS - TR MAX PG: 31 MMHG
BH CV ECHO MEAS - TR MAX VEL: 280 CM/SEC
BH CV ECHO MEASUREMENTS AVERAGE E/E' RATIO: 13.33
BH CV VAS BP RIGHT ARM: NORMAL MMHG
BH CV XLRA - RV BASE: 2.4 CM
BH CV XLRA - RV LENGTH: 7.3 CM
BH CV XLRA - RV MID: 2.3 CM
BH CV XLRA - TDI S': 19.7 CM/SEC
BILIRUB SERPL-MCNC: <0.2 MG/DL (ref 0–1.2)
BUN SERPL-MCNC: 24 MG/DL (ref 8–23)
BUN/CREAT SERPL: 19.7 (ref 7–25)
CALCIUM SPEC-SCNC: 8.3 MG/DL (ref 8.6–10.5)
CHLORIDE SERPL-SCNC: 107 MMOL/L (ref 98–107)
CK SERPL-CCNC: 120 U/L (ref 20–180)
CO2 SERPL-SCNC: 23 MMOL/L (ref 22–29)
CREAT SERPL-MCNC: 1.22 MG/DL (ref 0.57–1)
CRP SERPL-MCNC: 18.01 MG/DL (ref 0–0.5)
DEPRECATED RDW RBC AUTO: 46.9 FL (ref 37–54)
EGFRCR SERPLBLD CKD-EPI 2021: 48.7 ML/MIN/1.73
EOSINOPHIL # BLD MANUAL: 0 10*3/MM3 (ref 0–0.4)
EOSINOPHIL NFR BLD MANUAL: 0 % (ref 0.3–6.2)
ERYTHROCYTE [DISTWIDTH] IN BLOOD BY AUTOMATED COUNT: 13 % (ref 12.3–15.4)
ERYTHROCYTE [SEDIMENTATION RATE] IN BLOOD: 57 MM/HR (ref 0–30)
FERRITIN SERPL-MCNC: 469.8 NG/ML (ref 13–150)
GLOBULIN UR ELPH-MCNC: 2.1 GM/DL
GLUCOSE BLDC GLUCOMTR-MCNC: 92 MG/DL (ref 70–130)
GLUCOSE SERPL-MCNC: 96 MG/DL (ref 65–99)
HCT VFR BLD AUTO: 33.3 % (ref 34–46.6)
HGB BLD-MCNC: 10.8 G/DL (ref 12–15.9)
LEFT ATRIUM VOLUME INDEX: 25.9 ML/M2
LYMPHOCYTES # BLD MANUAL: 0.9 10*3/MM3 (ref 0.7–3.1)
LYMPHOCYTES NFR BLD MANUAL: 3 % (ref 5–12)
MCH RBC QN AUTO: 32 PG (ref 26.6–33)
MCHC RBC AUTO-ENTMCNC: 32.4 G/DL (ref 31.5–35.7)
MCV RBC AUTO: 98.5 FL (ref 79–97)
MONOCYTES # BLD: 0.3 10*3/MM3 (ref 0.1–0.9)
NEUTROPHILS # BLD AUTO: 8.8 10*3/MM3 (ref 1.7–7)
NEUTROPHILS NFR BLD MANUAL: 32 % (ref 42.7–76)
NEUTS BAND NFR BLD MANUAL: 56 % (ref 0–5)
PLAT MORPH BLD: NORMAL
PLATELET # BLD AUTO: 501 10*3/MM3 (ref 140–450)
PMV BLD AUTO: 9.3 FL (ref 6–12)
POTASSIUM SERPL-SCNC: 4.1 MMOL/L (ref 3.5–5.2)
PROT SERPL-MCNC: 5.5 G/DL (ref 6–8.5)
RBC # BLD AUTO: 3.38 10*6/MM3 (ref 3.77–5.28)
RBC MORPH BLD: NORMAL
SODIUM SERPL-SCNC: 140 MMOL/L (ref 136–145)
VANCOMYCIN SERPL-MCNC: 12.1 MCG/ML (ref 5–40)
VARIANT LYMPHS NFR BLD MANUAL: 9 % (ref 19.6–45.3)
WBC MORPH BLD: NORMAL
WBC NRBC COR # BLD AUTO: 10 10*3/MM3 (ref 3.4–10.8)

## 2024-05-21 PROCEDURE — G0378 HOSPITAL OBSERVATION PER HR: HCPCS

## 2024-05-21 PROCEDURE — 86235 NUCLEAR ANTIGEN ANTIBODY: CPT | Performed by: INTERNAL MEDICINE

## 2024-05-21 PROCEDURE — 25010000002 CEFTRIAXONE PER 250 MG: Performed by: INTERNAL MEDICINE

## 2024-05-21 PROCEDURE — 93010 ELECTROCARDIOGRAM REPORT: CPT | Performed by: INTERNAL MEDICINE

## 2024-05-21 PROCEDURE — 83516 IMMUNOASSAY NONANTIBODY: CPT | Performed by: INTERNAL MEDICINE

## 2024-05-21 PROCEDURE — 86225 DNA ANTIBODY NATIVE: CPT | Performed by: INTERNAL MEDICINE

## 2024-05-21 PROCEDURE — 86037 ANCA TITER EACH ANTIBODY: CPT | Performed by: INTERNAL MEDICINE

## 2024-05-21 PROCEDURE — 82728 ASSAY OF FERRITIN: CPT | Performed by: INTERNAL MEDICINE

## 2024-05-21 PROCEDURE — 82948 REAGENT STRIP/BLOOD GLUCOSE: CPT

## 2024-05-21 PROCEDURE — 85007 BL SMEAR W/DIFF WBC COUNT: CPT | Performed by: INTERNAL MEDICINE

## 2024-05-21 PROCEDURE — 99232 SBSQ HOSP IP/OBS MODERATE 35: CPT | Performed by: STUDENT IN AN ORGANIZED HEALTH CARE EDUCATION/TRAINING PROGRAM

## 2024-05-21 PROCEDURE — 86140 C-REACTIVE PROTEIN: CPT | Performed by: INTERNAL MEDICINE

## 2024-05-21 PROCEDURE — 25010000002 MORPHINE PER 10 MG: Performed by: NURSE PRACTITIONER

## 2024-05-21 PROCEDURE — 80053 COMPREHEN METABOLIC PANEL: CPT | Performed by: INTERNAL MEDICINE

## 2024-05-21 PROCEDURE — 93005 ELECTROCARDIOGRAM TRACING: CPT | Performed by: STUDENT IN AN ORGANIZED HEALTH CARE EDUCATION/TRAINING PROGRAM

## 2024-05-21 PROCEDURE — 25010000002 HEPARIN (PORCINE) PER 1000 UNITS: Performed by: INTERNAL MEDICINE

## 2024-05-21 PROCEDURE — 82550 ASSAY OF CK (CPK): CPT | Performed by: INTERNAL MEDICINE

## 2024-05-21 PROCEDURE — 80202 ASSAY OF VANCOMYCIN: CPT

## 2024-05-21 PROCEDURE — 85652 RBC SED RATE AUTOMATED: CPT | Performed by: INTERNAL MEDICINE

## 2024-05-21 PROCEDURE — 25810000003 SODIUM CHLORIDE 0.9 % SOLUTION: Performed by: INTERNAL MEDICINE

## 2024-05-21 PROCEDURE — 25010000002 VANCOMYCIN 1 G RECONSTITUTED SOLUTION 1 EACH VIAL: Performed by: RADIOLOGY

## 2024-05-21 PROCEDURE — 93306 TTE W/DOPPLER COMPLETE: CPT | Performed by: INTERNAL MEDICINE

## 2024-05-21 PROCEDURE — 25810000003 SODIUM CHLORIDE 0.9 % SOLUTION 250 ML FLEX CONT: Performed by: RADIOLOGY

## 2024-05-21 PROCEDURE — 93306 TTE W/DOPPLER COMPLETE: CPT

## 2024-05-21 PROCEDURE — 85025 COMPLETE CBC W/AUTO DIFF WBC: CPT | Performed by: INTERNAL MEDICINE

## 2024-05-21 RX ORDER — LIDOCAINE 4 G/G
1 PATCH TOPICAL
Status: DISCONTINUED | OUTPATIENT
Start: 2024-05-21 | End: 2024-05-25 | Stop reason: HOSPADM

## 2024-05-21 RX ORDER — LIDOCAINE 4 G/G
1 PATCH TOPICAL
Status: DISCONTINUED | OUTPATIENT
Start: 2024-05-21 | End: 2024-05-21

## 2024-05-21 RX ORDER — ACETAMINOPHEN 325 MG/1
650 TABLET ORAL EVERY 4 HOURS PRN
Status: DISCONTINUED | OUTPATIENT
Start: 2024-05-21 | End: 2024-05-23

## 2024-05-21 RX ORDER — MORPHINE SULFATE 2 MG/ML
2 INJECTION, SOLUTION INTRAMUSCULAR; INTRAVENOUS ONCE
Status: COMPLETED | OUTPATIENT
Start: 2024-05-21 | End: 2024-05-21

## 2024-05-21 RX ORDER — OXYCODONE HYDROCHLORIDE 5 MG/1
5 TABLET ORAL EVERY 6 HOURS PRN
Status: DISCONTINUED | OUTPATIENT
Start: 2024-05-21 | End: 2024-05-25 | Stop reason: HOSPADM

## 2024-05-21 RX ORDER — OXYCODONE AND ACETAMINOPHEN 7.5; 325 MG/1; MG/1
1 TABLET ORAL ONCE AS NEEDED
Status: COMPLETED | OUTPATIENT
Start: 2024-05-21 | End: 2024-05-21

## 2024-05-21 RX ORDER — TRAMADOL HYDROCHLORIDE 50 MG/1
50 TABLET ORAL ONCE
Status: COMPLETED | OUTPATIENT
Start: 2024-05-21 | End: 2024-05-21

## 2024-05-21 RX ADMIN — OXYCODONE 5 MG: 5 TABLET ORAL at 23:54

## 2024-05-21 RX ADMIN — SODIUM CHLORIDE 75 ML/HR: 9 INJECTION, SOLUTION INTRAVENOUS at 18:09

## 2024-05-21 RX ADMIN — THIAMINE HCL TAB 100 MG 50 MG: 100 TAB at 08:33

## 2024-05-21 RX ADMIN — ACETAMINOPHEN 650 MG: 325 TABLET ORAL at 15:29

## 2024-05-21 RX ADMIN — ACETAMINOPHEN 650 MG: 325 TABLET ORAL at 20:59

## 2024-05-21 RX ADMIN — VANCOMYCIN HYDROCHLORIDE 1000 MG: 1 INJECTION, POWDER, LYOPHILIZED, FOR SOLUTION INTRAVENOUS at 00:02

## 2024-05-21 RX ADMIN — ROSUVASTATIN 5 MG: 10 TABLET, FILM COATED ORAL at 20:51

## 2024-05-21 RX ADMIN — OXYCODONE 5 MG: 5 TABLET ORAL at 16:46

## 2024-05-21 RX ADMIN — HEPARIN SODIUM 5000 UNITS: 5000 INJECTION INTRAVENOUS; SUBCUTANEOUS at 15:22

## 2024-05-21 RX ADMIN — LIDOCAINE 1 PATCH: 4 PATCH TOPICAL at 16:46

## 2024-05-21 RX ADMIN — HEPARIN SODIUM 5000 UNITS: 5000 INJECTION INTRAVENOUS; SUBCUTANEOUS at 20:51

## 2024-05-21 RX ADMIN — Medication 10 ML: at 20:52

## 2024-05-21 RX ADMIN — TEMAZEPAM 15 MG: 15 CAPSULE ORAL at 20:51

## 2024-05-21 RX ADMIN — SODIUM CHLORIDE 2000 MG: 900 INJECTION INTRAVENOUS at 08:33

## 2024-05-21 RX ADMIN — TEMAZEPAM 15 MG: 15 CAPSULE ORAL at 00:57

## 2024-05-21 RX ADMIN — OXYCODONE HYDROCHLORIDE AND ACETAMINOPHEN 1 TABLET: 7.5; 325 TABLET ORAL at 10:17

## 2024-05-21 RX ADMIN — ACETAMINOPHEN 650 MG: 325 TABLET ORAL at 00:02

## 2024-05-21 RX ADMIN — MORPHINE SULFATE 2 MG: 2 INJECTION, SOLUTION INTRAMUSCULAR; INTRAVENOUS at 06:05

## 2024-05-21 RX ADMIN — TRAMADOL HYDROCHLORIDE 50 MG: 50 TABLET, COATED ORAL at 05:06

## 2024-05-21 RX ADMIN — Medication 10 ML: at 08:35

## 2024-05-21 NOTE — SIGNIFICANT NOTE
Called per nursing secondary to increasing bilateral upper arm pain. Pt reporting significant pain in upper extremities. Right greater than left. Difficulty lifting objects secondary to pain. Tramadol ordered without relief.   May need to consider advanced spine imaging if pain continues. LP performed on 05/20/2024. Continue antibiotic coverage. Plan of care discussed with on call attending.

## 2024-05-21 NOTE — PROGRESS NOTES
[facility] Medicine Services  PROGRESS NOTE    Patient Name: Linda Good  : 1955  MRN: [id number]    Date of Admission: 2024  Primary Care Physician: Julissa Vernon PA-C    Subjective   Subjective     CC:  Fever    HPI:  Reports continued fatigue.  Currently denied any pain.  Reports new erythema bilateral hands that is nontender.  Had bilateral shoulder pain right worse than left that began before she came into the hospital and worsened since admission, but not currently hurting her while I am in the room.  Denied cough, congestion, nausea, vomiting, diarrhea, dysuria, urinary frequency, recent tick bites, recent travel, recent sick contacts. She drinks city water.  She does not drink raw milk.  She does have a cat.  Patient's brother is at bedside and patient is okay with him being updated.    Objective   Objective     Vital Signs:   Temp:  [98.1 °F (36.7 °C)-98.9 °F (37.2 °C)] 98.6 °F (37 °C)  Heart Rate:  [] 96  Resp:  [16-18] 18  BP: (112-135)/(61-77) 125/61     Physical Exam:  Constitutional: No acute distress, awake, alert  HENT: NCAT, mucous membranes moist  Respiratory: Clear to auscultation bilaterally, respiratory effort normal   Cardiovascular: RRR  Gastrointestinal: Positive bowel sounds, soft, nontender, nondistended  Musculoskeletal: No bilateral ankle edema  Psychiatric: Appropriate affect, cooperative  Neurologic: Alert, oriented, strength symmetric in all extremities, Cranial Nerves grossly intact to confrontation, speech clear  Skin: Erythema of chest, erythema bilateral wrist, see photos            Results Reviewed:  LAB RESULTS:      Lab 24  0528 24  1350   WBC 10.00 13.99*   HEMOGLOBIN 10.8* 12.2   HEMATOCRIT 33.3* 37.9   PLATELETS 501* 609*   NEUTROS ABS 8.80* 12.03*   EOS ABS 0.00 0.00   MCV 98.5* 99.0*   SED RATE 57*  --    PROCALCITONIN  --  1.89*   LACTATE  --  1.8         Lab 24  0528 24  1350   SODIUM 140 132*    POTASSIUM 4.1 4.2   CHLORIDE 107 92*   CO2 23.0 22.0   ANION GAP 10.0 18.0*   BUN 24* 36*   CREATININE 1.22* 2.00*   EGFR 48.7* 26.9*   GLUCOSE 96 118*   CALCIUM 8.3* 9.2   MAGNESIUM  --  2.1   TSH  --  2.020         Lab 05/21/24  0528 05/20/24  1350   TOTAL PROTEIN 5.5* 7.4   ALBUMIN 3.4* 3.9   GLOBULIN 2.1 3.5   ALT (SGPT) 34* 50*   AST (SGOT) 19 26   BILIRUBIN <0.2 0.2   ALK PHOS 92 115                     Brief Urine Lab Results  (Last result in the past 365 days)        Color   Clarity   Blood   Leuk Est   Nitrite   Protein   CREAT   Urine HCG        05/20/24 1441 Dark Yellow   Turbid   Negative   Trace   Negative   30 mg/dL (1+)                   Microbiology Results Abnormal       Procedure Component Value - Date/Time    Blood Culture - Blood, Arm, Right [[id number]]  (Normal) Collected: 05/20/24 1438    Lab Status: Preliminary result Specimen: Blood from Arm, Right Updated: 05/21/24 1515     Blood Culture No growth at 24 hours    Blood Culture - Blood, Arm, Left [[id number]]  (Normal) Collected: 05/20/24 1350    Lab Status: Preliminary result Specimen: Blood from Arm, Left Updated: 05/21/24 1415     Blood Culture No growth at 24 hours    Urine Culture - Urine, Urine, Clean Catch [[id number]]  (Normal) Collected: 05/20/24 1441    Lab Status: Preliminary result Specimen: Urine, Clean Catch Updated: 05/21/24 1039     Urine Culture No growth    Culture, CSF - Cerebrospinal Fluid, Lumbar Puncture [[id number]] Collected: 05/20/24 1830    Lab Status: Preliminary result Specimen: Cerebrospinal Fluid from Lumbar Puncture Updated: 05/21/24 0818     CSF Culture No growth     Gram Stain Moderate (3+) Red blood cells      Occasional WBCs seen      No organisms seen    Meningitis / Encephalitis Panel, PCR - Cerebrospinal Fluid, Lumbar Puncture [[id number]]  (Normal) Collected: 05/20/24 1830    Lab Status: Final result Specimen: Cerebrospinal Fluid from Lumbar Puncture Updated: 05/20/24 2115     ESCHERICHIA COLI K1, PCR Not  Detected     HAEMOPHILUS INFLUENZAE, PCR Not Detected     LISTERIA MONOCYTOGENES, PCR Not Detected     NEISSERIA MENINGITIDIS, PCR Not Detected     STREPTOCOCCUS AGALACTIAE, PCR Not Detected     STREPTOCOCCUS PNEUMONIAE, PCR Not Detected     CYTOMEGALOVIRUS (CMV), PCR Not Detected     ENTEROVIRUS, PCR Not Detected     HERPES SIMPLEX VIRUS 1 (HSV-1), PCR Not Detected     HERPES SIMPLEX VIRUS 2 (HSV-2), PCR Not Detected     HUMAN PARECHOVIRUS, PCR Not Detected     VARICELLA ZOSTER VIRUS (VZV), PCR Not Detected     CRYPTOCOCCUS NEOFORMANS / GATTII, PCR Not Detected     HUMAN HERPES VIRUS 6 PCR Not Detected    Respiratory Panel PCR w/COVID-19(SARS-CoV-2) MIKEY/YENI/VENTURA/PAD/COR/LEONA In-House, NP Swab in UTM/VTM, 2 HR TAT - Swab, Nasopharynx [[id number]]  (Normal) Collected: 05/20/24 1434    Lab Status: Final result Specimen: Swab from Nasopharynx Updated: 05/20/24 1546     ADENOVIRUS, PCR Not Detected     Coronavirus 229E Not Detected     Coronavirus HKU1 Not Detected     Coronavirus NL63 Not Detected     Coronavirus OC43 Not Detected     COVID19 Not Detected     Human Metapneumovirus Not Detected     Human Rhinovirus/Enterovirus Not Detected     Influenza A PCR Not Detected     Influenza B PCR Not Detected     Parainfluenza Virus 1 Not Detected     Parainfluenza Virus 2 Not Detected     Parainfluenza Virus 3 Not Detected     Parainfluenza Virus 4 Not Detected     RSV, PCR Not Detected     Bordetella pertussis pcr Not Detected     Bordetella parapertussis PCR Not Detected     Chlamydophila pneumoniae PCR Not Detected     Mycoplasma pneumo by PCR Not Detected    Narrative:      In the setting of a positive respiratory panel with a viral infection PLUS a negative procalcitonin without other underlying concern for bacterial infection, consider observing off antibiotics or discontinuation of antibiotics and continue supportive care. If the respiratory panel is positive for atypical bacterial infection (Bordetella pertussis,  Chlamydophila pneumoniae, or Mycoplasma pneumoniae), consider antibiotic de-escalation to target atypical bacterial infection.            Invasive peripheral vascular study    Result Date: 5/20/2024  Clinical indication: 69-year-old female with history of UTI, fever/headache, concern for meningitis.  Failed prior lumbar puncture. :  Dr. Chowdary Given Procedures:  Fluoroscopic guidance lumbar puncture diagnosis Access: 18-gauge-gauge spinal needle in the thecal sac at the mid lumbar region. Contrast: none Estimated Blood Loss:  Negligible Complication:  None Apparent. Technique:  Formal written consent for the procedure was obtained from the patient's/patient's family after explaining risks to include bleeding, infection, spinal nerve/cord injury, and headache.  The lumbar region was prepped and draped in the usual, sterile fashion.  Local anesthesia with 1% lidocaine infiltration was achieved.  Utilizing fluoroscopic guidance, a 18-gauge spinal needle was placed into the thecal sac of the mid lumbar region without difficulty.  Blood-tinged CSF was encountered, with an opening pressure less than 15 cm of water in the prone position.  Approximately 10 milliliters of blood-tinged CSF was drawn off into 4 tubes without difficulty.  CSF was sent for microbiology and/or cytologic/pathologic analysis, per the referring service.  The patient tolerated the procedure well and without apparent complication.     Impression:   Technically successful fluoroscopic guided lumbar puncture for diagnosis.  10 cc of blood-tinged CSF was sent for culture/labs, per the referring service.     CT Head Without Contrast    Result Date: 5/20/2024  CT HEAD WO CONTRAST Date of Exam: 5/20/2024 3:21 PM EDT Indication: HA, gen weakness. Comparison: None available. Technique: Axial CT images were obtained of the head without contrast administration.  Automated exposure control and iterative construction methods were used. Findings:  Gray-white differentiation is maintained and there is no evidence of intracranial hemorrhage, mass or mass effect. The ventricles are normal in size and configuration. The orbits are normal. The paranasal sinuses are clear. The calvarium is intact.     Impression: Impression: No acute intracranial abnormality. Electronically Signed: Ruperto Chavez MD  5/20/2024 3:34 PM EDT  Workstation ID: [id number]    XR Chest 1 View    Result Date: 5/20/2024  XR CHEST 1 VW Date of Exam: 5/20/2024 1:45 PM EDT Indication: Generalized weakness Comparison: None available. Findings: The cardiomediastinal silhouette is within normal limits. Pulmonary vascularity appears normal. There is no focal airspace consolidation, pleural effusion, or pneumothorax. There are calcified lymph nodes within the left hilar region likely related to chronic granulomatous disease. There are degenerative changes of the thoracic spine.     Impression: Impression: 1. No acute cardiopulmonary abnormality. Electronically Signed: Burke Ray  5/20/2024 2:07 PM EDT  Workstation ID: [id number]         Current medications:  Scheduled Meds:cefTRIAXone, 2,000 mg, Intravenous, Q12H  heparin (porcine), 5,000 Units, Subcutaneous, Q8H  rosuvastatin, 5 mg, Oral, Nightly  sodium chloride, 10 mL, Intravenous, Q12H  vitamin B-1, 50 mg, Oral, Daily  [START ON 5/22/2024] vancomycin, 750 mg, Intravenous, Q18H      Continuous Infusions:Pharmacy to dose vancomycin,   sodium chloride, 75 mL/hr, Last Rate: 75 mL/hr (05/20/24 2350)      PRN Meds:.  acetaminophen    senna-docusate sodium **AND** polyethylene glycol **AND** bisacodyl **AND** bisacodyl    ondansetron ODT **OR** ondansetron    Pharmacy to dose vancomycin    sodium chloride    sodium chloride    temazepam    Assessment & Plan   Assessment & Plan     Active Hospital Problems    Diagnosis  POA    **Fever [R50.9]  Yes    BLADE (acute kidney injury) [N17.9]  Yes    Bandemia [D72.825]  Yes    Primary hypertension [I10]  Yes       Resolved Hospital Problems   No resolved problems to display.        Brief Hospital Course to date:  Linda Good is a 69 y.o. female  with h/o HTN and HLD who reported fever up to 101.9 and off/on generalized headache, myalgias, night sweats, fatigue, and loss of appetite x about 12 days prior to presentation.     This patient's problems and plans were partially entered by my partner and updated as appropriate by me 05/21/24.     Sepsis with Bandemia  Fever without a source  Headache  --Infectious versus rheumatologic issue; does have chronic upper chest erythematous rash; on exam also has new erythema on bilateral wrists and right second MCP joint  --s/p LP with negative meningitis panel  --s/p recent macrobid and then cefdinir for presumed UTI  --s/p vanc, rocephin, and ampicillin in ER.  Will continue vanc and rocephin empiric for now  --negative for COVID, mono, and flu.  Respiratory panel negative  --follow cultures  --no hardware, [place]  --consulted ID, discussed with Dr. Man Mandel on 5/21  --follow-up ANCA, ferritin, CK, ESR, CRP, blood cultures  --Follow-up echo result  --If above workup is unrevealing, may require additional imaging     BLADE  --Baseline creatinine around 0.75; creatinine 2 on admission, improving with IV fluids  --hold HCTZ and lisinopril  --IVF     HTN  HLD  --hold home HCTZ and lisinopril d/t above  --continue statin     Expected Discharge Location and Transportation: home  Expected Discharge   Expected discharge date/ time has not been documented.     DVT prophylaxis:  Medical DVT prophylaxis orders are present.         AM-PAC 6 Clicks Score (PT): 24 (05/20/24 2100)    CODE STATUS:   Code Status and Medical Interventions:   Ordered at: 05/20/24 1566     Level Of Support Discussed With:    Patient     Code Status (Patient has no pulse and is not breathing):    CPR (Attempt to Resuscitate)     Medical Interventions (Patient has pulse or  is breathing):    Full Support       Rosey Mora MD  05/21/24

## 2024-05-21 NOTE — ED NOTES
" Linda Good    Nursing Report ED to Floor:  Mental status: a&ox4  Ambulatory status: sb  Oxygen Therapy:  ra  Cardiac Rhythm: nsr  Admitted from: home  Safety Concerns:  na  Social Issues: na  ED Room #:  11    ED Nurse Phone Extension - [phone] or may call [phone].      HPI:   Chief Complaint   Patient presents with    Headache    Fever       Past Medical History:  Past Medical History:   Diagnosis Date    Diabetes mellitus 3/1/1993    Gestational    Hyperlipidemia     Hypertension     Pregnancy     1993,1982,1980,1978        Past Surgical History:  Past Surgical History:   Procedure Laterality Date    BREAST SURGERY  1997    Lumpectomy/Benign    COLONOSCOPY      TUBAL ABDOMINAL LIGATION  1993        Admitting Doctor:   Jsas Greenberg MD    Consulting Provider(s):  Consults       No orders found from 4/21/2024 to 5/21/2024.             Admitting Diagnosis:   The primary encounter diagnosis was SIRS (systemic inflammatory response syndrome). Diagnoses of Leukocytosis, unspecified type, BLADE (acute kidney injury), and Headache, unspecified headache type were also pertinent to this visit.    Most Recent Vitals:   Vitals:    05/20/24 1314 05/20/24 1759 05/20/24 1800   BP: 105/55 112/67    BP Location: Left arm     Patient Position: Sitting     Pulse: 108 105 105   Resp: 14     Temp: 97.9 °F (36.6 °C)     TempSrc: Oral     SpO2: 96% 96% 97%   Weight: 53.5 kg (118 lb)     Height: 160 cm (63\")         Active LDAs/IV Access:   Lines, Drains & Airways       Active LDAs       Name Placement date Placement time Site Days    Peripheral IV 05/20/24 1617 Right Antecubital 05/20/24  1617  Antecubital  less than 1                    Labs (abnormal labs have a star):   Labs Reviewed   COMPREHENSIVE METABOLIC PANEL - Abnormal; Notable for the following components:       Result Value    Glucose 118 (*)     BUN 36 (*)     Creatinine 2.00 (*)     Sodium 132 (*)     Chloride 92 (*)     ALT (SGPT) 50 (*)     Anion Gap 18.0 (*)     eGFR " "26.9 (*)     All other components within normal limits    Narrative:     GFR Normal >60  Chronic Kidney Disease <60  Kidney Failure <15     URINALYSIS W/ CULTURE IF INDICATED - Abnormal; Notable for the following components:    Color, UA Dark Yellow (*)     Appearance, UA Turbid (*)     Glucose,  mg/dL (Trace) (*)     Ketones, UA Trace (*)     Bilirubin, UA Moderate (2+) (*)     Protein, UA 30 mg/dL (1+) (*)     Leuk Esterase, UA Trace (*)     All other components within normal limits    Narrative:     In absence of clinical symptoms, the presence of pyuria, bacteria, and/or nitrites on the urinalysis result does not correlate with infection.   PROCALCITONIN - Abnormal; Notable for the following components:    Procalcitonin 1.89 (*)     All other components within normal limits    Narrative:     As a Marker for Sepsis (Non-Neonates):    1. <0.5 ng/mL represents a low risk of severe sepsis and/or septic shock.  2. >2 ng/mL represents a high risk of severe sepsis and/or septic shock.    As a Marker for Lower Respiratory Tract Infections that require antibiotic therapy:    PCT on Admission    Antibiotic Therapy       6-12 Hrs later    >0.5                Strongly Recommended  >0.25 - <0.5        Recommended   0.1 - 0.25          Discouraged              Remeasure/reassess PCT  <0.1                Strongly Discouraged     Remeasure/reassess PCT    As 28 day mortality risk marker: \"Change in Procalcitonin Result\" (>80% or <=80%) if Day 0 (or Day 1) and Day 4 values are available. Refer to [email or web address]://[email or web address]-pct-[email or web address]    Change in PCT <=80%  A decrease of PCT levels below or equal to 80% defines a positive change in PCT test result representing a higher risk for 28-day all-cause mortality of patients diagnosed with severe sepsis for septic shock.    Change in PCT >80%  A decrease of PCT levels of more than 80% defines a negative change in PCT result representing a lower risk for 28-day all-cause mortality of " patients diagnosed with severe sepsis or septic shock.      CBC WITH AUTO DIFFERENTIAL - Abnormal; Notable for the following components:    WBC 13.99 (*)     MCV 99.0 (*)     Platelets 609 (*)     All other components within normal limits    Narrative:     The previously reported component NRBC is no longer being reported. Previous result was 0.0 /100 WBC (Reference Range: 0.0-0.2 /100 WBC) on 5/20/2024 at 1421 EDT.   MANUAL DIFFERENTIAL - Abnormal; Notable for the following components:    Lymphocyte % 9.0 (*)     Monocyte % 3.0 (*)     Eosinophil % 0.0 (*)     Bands %  40.0 (*)     Neutrophils Absolute 12.03 (*)     All other components within normal limits    Narrative:     Automated count may be inaccurate due to presence of platelet clumps. Platelets appear increased upon review of peripheral smear.     URINALYSIS, MICROSCOPIC ONLY - Abnormal; Notable for the following components:    RBC, UA 3-5 (*)     WBC, UA 21-50 (*)     Bacteria, UA 2+ (*)     Squamous Epithelial Cells, UA 21-30 (*)     All other components within normal limits   PROTEIN, CSF - Abnormal; Notable for the following components:    Protein, Total (CSF) 95.3 (*)     All other components within normal limits   CELL COUNT CSF - Abnormal; Notable for the following components:    WBC, CSF 29 (*)     RBC, CSF 20,000 (*)     Color, CSF Red (*)     Appearance, CSF Cloudy (*)     All other components within normal limits    Narrative:     If the reference range(s) are not listed, then the reference range(s) have not been defined, so unavailable for the body fluid result.   SPINAL FLUID DIFFERENTIAL - Abnormal; Notable for the following components:    Neutrophils, CSF 66 (*)     Lymphocytes, CSF 23 (*)     All other components within normal limits    Narrative:     If the reference range(s) are not listed, then the reference range(s) have not been defined, so unavailable for the body fluid result.   RESPIRATORY PANEL PCR W/ COVID-19 (SARS-COV-2), NP SWAB  IN UTM/VTP, 2 HR TAT - Normal    Narrative:     In the setting of a positive respiratory panel with a viral infection PLUS a negative procalcitonin without other underlying concern for bacterial infection, consider observing off antibiotics or discontinuation of antibiotics and continue supportive care. If the respiratory panel is positive for atypical bacterial infection (Bordetella pertussis, Chlamydophila pneumoniae, or Mycoplasma pneumoniae), consider antibiotic de-escalation to target atypical bacterial infection.   LACTIC ACID, PLASMA - Normal   T4, FREE - Normal   TSH - Normal   MAGNESIUM - Normal   MONONUCLEOSIS SCREEN - Normal    Narrative:     Indicates the absence of Heterophile Antibodies associated with Infectious Mononucleosis.   GLUCOSE, CSF - Normal   BLOOD CULTURE   BLOOD CULTURE   URINE CULTURE   CULTURE, CSF   MENINGITIS / ENCEPHALITIS PANEL, PCR   CBC AND DIFFERENTIAL    Narrative:     The following orders were created for panel order CBC & Differential.  Procedure                               Abnormality         Status                     ---------                               -----------         ------                     CBC Auto Differential[[id number]]        Abnormal            Final result               Scan Slide[[id number]]                                                                    Please view results for these tests on the individual orders.   CELL COUNT WITH DIFFERENTIAL, CSF    Narrative:     The following orders were created for panel order Cell Count With Differential, CSF Use CSF Tube: 4.  Procedure                               Abnormality         Status                     ---------                               -----------         ------                     Cell Count, CSF - Cerebr...[[id number]]  Abnormal            Final result               Spinal fluid differentia...[[id number]]  Abnormal            Final result                 Please view results for these tests on  the individual orders.   CSF TUBE       Meds Given in ED:   Medications   vancomycin (VANCOCIN) 1,000 mg in sodium chloride 0.9 % 250 mL IVPB-VTB (has no administration in time range)   cefTRIAXone (ROCEPHIN) 2,000 mg in sodium chloride 0.9 % 100 mL MBP (has no administration in time range)   ampicillin 2000 mg IVPB in 100 mL NS (MBP) (has no administration in time range)   sodium chloride 0.9 % bolus 1,000 mL (1,000 mL Intravenous New Bag 5/20/24 2022)   sodium chloride 0.9 % bolus 1,000 mL (0 mL Intravenous Stopped 5/20/24 1744)   lidocaine 1% - EPINEPHrine 1:[id number] (XYLOCAINE W/EPI) 1 %-1:314942 injection 10 mL (10 mL Injection Given by Other 5/20/24 1700)           Last NIH score:                                                          Dysphagia screening results:        [place] Coma Scale:  No data recorded     CIWA:        Restraint Type:            Isolation Status:  No active isolations

## 2024-05-21 NOTE — PROGRESS NOTES
"Pharmacy Consult-Vancomycin Dosing  Linda Good is a  69 y.o. female receiving vancomycin therapy.     Indication: CNS infection  Consulting Provider: hospitalist  ID Consult: Yes    Goal Trough: 15-20 mcg/mL    Current Antimicrobial Therapy  Anti-Infectives (From admission, onward)      Ordered     Dose/Rate Route Frequency Start Stop    05/20/24 2153  cefTRIAXone (ROCEPHIN) 2,000 mg in sodium chloride 0.9 % 100 mL MBP        Ordering Provider: Jass Greenberg MD    2,000 mg  200 mL/hr over 30 Minutes Intravenous Every 12 Hours Scheduled 05/21/24 0900 05/28/24 0859    05/20/24 2212  vancomycin (dosing per levels)        Ordering Provider: Dennys Parker PharmD   Placed in \"And\" Linked Group     Does not apply Daily 05/21/24 0900 05/27/24 0859    05/20/24 2153  Pharmacy to dose vancomycin        Ordering Provider: Jass Greenberg MD     Does not apply Continuous PRN 05/20/24 2152 05/27/24 2151 05/20/24 1815  vancomycin (VANCOCIN) 1,000 mg in sodium chloride 0.9 % 250 mL IVPB-VTB        Ordering Provider: Epi Blackburn MD    20 mg/kg × 53.5 kg  250 mL/hr over 60 Minutes Intravenous Once 05/20/24 1915 05/21/24 0102    05/20/24 1815  cefTRIAXone (ROCEPHIN) 2,000 mg in sodium chloride 0.9 % 100 mL MBP        Ordering Provider: Man Moreland MD    2,000 mg  200 mL/hr over 30 Minutes Intravenous Once 05/20/24 1831 05/20/24 2333            Allergies  Allergies as of 05/20/2024    (No Known Allergies)       Labs  Results from last 7 days   Lab Units 05/21/24  0528 05/20/24  1350   BUN mg/dL 24* 36*   CREATININE mg/dL 1.22* 2.00*     Results from last 7 days   Lab Units 05/21/24  0528 05/20/24  1350   WBC 10*3/mm3 10.00 13.99*       Evaluation of Dosing  Last Dose Received in the ED/Outside Facility:        N/A  Is Patient on Dialysis or Renal Replacement:        No -- BLADE on admission    Ht - 160 cm (62.99\")  Wt - 53 kg (116 lb 13.5 oz)    Estimated Creatinine Clearance: 37.4 mL/min (A) (by C-G " formula based on SCr of 1.22 mg/dL (H)).  Intake & Output (last 3 days)       None            Microbiology and Radiology  Microbiology Results (last 10 days)       Procedure Component Value - Date/Time    Culture, CSF - Cerebrospinal Fluid, Lumbar Puncture [[id number]] Collected: 05/20/24 1830    Lab Status: Preliminary result Specimen: Cerebrospinal Fluid from Lumbar Puncture Updated: 05/21/24 0818     CSF Culture No growth     Gram Stain Moderate (3+) Red blood cells      Occasional WBCs seen      No organisms seen    Meningitis / Encephalitis Panel, PCR - Cerebrospinal Fluid, Lumbar Puncture [[id number]]  (Normal) Collected: 05/20/24 1830    Lab Status: Final result Specimen: Cerebrospinal Fluid from Lumbar Puncture Updated: 05/20/24 2115     ESCHERICHIA COLI K1, PCR Not Detected     HAEMOPHILUS INFLUENZAE, PCR Not Detected     LISTERIA MONOCYTOGENES, PCR Not Detected     NEISSERIA MENINGITIDIS, PCR Not Detected     STREPTOCOCCUS AGALACTIAE, PCR Not Detected     STREPTOCOCCUS PNEUMONIAE, PCR Not Detected     CYTOMEGALOVIRUS (CMV), PCR Not Detected     ENTEROVIRUS, PCR Not Detected     HERPES SIMPLEX VIRUS 1 (HSV-1), PCR Not Detected     HERPES SIMPLEX VIRUS 2 (HSV-2), PCR Not Detected     HUMAN PARECHOVIRUS, PCR Not Detected     VARICELLA ZOSTER VIRUS (VZV), PCR Not Detected     CRYPTOCOCCUS NEOFORMANS / GATTII, PCR Not Detected     HUMAN HERPES VIRUS 6 PCR Not Detected    Urine Culture - Urine, Urine, Clean Catch [[id number]]  (Normal) Collected: 05/20/24 1441    Lab Status: Preliminary result Specimen: Urine, Clean Catch Updated: 05/21/24 1039     Urine Culture No growth    Respiratory Panel PCR w/COVID-19(SARS-CoV-2) [facility] In-House, NP Swab in UTM/VTM, 2 HR TAT - Swab, Nasopharynx [[id number]]  (Normal) Collected: 05/20/24 1434    Lab Status: Final result Specimen: Swab from Nasopharynx Updated: 05/20/24 1546     ADENOVIRUS, PCR Not Detected     Coronavirus 229E Not Detected     Coronavirus HKU1  Not Detected     Coronavirus NL63 Not Detected     Coronavirus OC43 Not Detected     COVID19 Not Detected     Human Metapneumovirus Not Detected     Human Rhinovirus/Enterovirus Not Detected     Influenza A PCR Not Detected     Influenza B PCR Not Detected     Parainfluenza Virus 1 Not Detected     Parainfluenza Virus 2 Not Detected     Parainfluenza Virus 3 Not Detected     Parainfluenza Virus 4 Not Detected     RSV, PCR Not Detected     Bordetella pertussis pcr Not Detected     Bordetella parapertussis PCR Not Detected     Chlamydophila pneumoniae PCR Not Detected     Mycoplasma pneumo by PCR Not Detected    Narrative:      In the setting of a positive respiratory panel with a viral infection PLUS a negative procalcitonin without other underlying concern for bacterial infection, consider observing off antibiotics or discontinuation of antibiotics and continue supportive care. If the respiratory panel is positive for atypical bacterial infection (Bordetella pertussis, Chlamydophila pneumoniae, or Mycoplasma pneumoniae), consider antibiotic de-escalation to target atypical bacterial infection.    Urine Culture - Urine, Urine, Clean Catch [[id number]] Collected: 05/14/24 1439    Lab Status: Final result Specimen: Urine, Clean Catch Updated: 05/17/24 0712     Urine Culture Final report     Result 1 Comment     Comment: Mixed urogenital maco  50,000-100,000 colony forming units per mL         Narrative:      Performed at:  [address] [place] [address]  [id number]  : Zia Pearson PhD, Phone:  [phone]            Vancomycin Levels:  Results from last 7 days   Lab Units 05/21/24  0528   VANCOMYCIN RM mcg/mL 12.10               Assessment/Plan:  Pharmacy to dose vancomycin for treatment of sepsis of unknown origin and concern of meningitis. Patient has received a one-time loading dose of vancomycin 1000 mg IV (19 mg/kg) with subsequent dosing to be determined per levels.  Vancomycin  level today is 12.1 mcg/mL and represents a 5.5 hr level following 1st total dose (level was ordered for 1200 but drawn ~6.5 hrs early at 0528). Patient's renal function appears improved (Scr improved from 2.0 to 1.22 mg/dL), urine output unmeasured to this point. Today, will order a maintenance dosing regimen of vancomycin 750 mg IV q18h (14 mg/kg) given apparent improvement in renal function.  Will schedule another vancomycin level for 5/23 at AM labs, prior to the 3rd dose of new regimen. BMP also ordered for AM labs tomorrow to trend renal function.  Pharmacy will continue to monitor and adjust vancomycin dose as necessary based on renal function, cultures, labs, and clinical status.    Isaiah Chairez, PharmD, BCPS  5/21/2024  13:51 EDT

## 2024-05-21 NOTE — ED PROVIDER NOTES
Subjective   History of Present Illness  69-year-old female sent to the emergency department by her primary care physician to be evaluated for headache and fever.  The patient tells me that she began to feel ill about 10 days ago with a headache.  She then began experiencing fever.  She is unsure as what may have caused her symptoms.  She denies any recent foreign travel.  No tick bites.  No rash.  Given her symptoms, she went and saw her primary care physician this past week and was started on oral antibiotics for a UTI.  However, her symptoms do not seem to be improving.  As a result, she was advised to come here to the emergency department for evaluation today.  She denies any neck stiffness.  She currently rates her headache at 5 out of 10 in severity.  She denies any preceding head trauma or injury.  She has been on both Macrobid and cefdinir but continues to be symptomatic.  She notes poor oral intake over the past week as result of her ongoing symptoms.      Review of Systems   Constitutional:  Positive for appetite change and fatigue.   Neurological:  Positive for headaches.   All other systems reviewed and are negative.      Past Medical History:   Diagnosis Date    Diabetes mellitus 3/1/1993    Gestational    Hyperlipidemia     Hypertension     Pregnancy     1993,1982,1980,1978       No Known Allergies    Past Surgical History:   Procedure Laterality Date    BREAST SURGERY  1997    Lumpectomy/Benign    COLONOSCOPY      TUBAL ABDOMINAL LIGATION  1993       Family History   Problem Relation Age of Onset    Other Mother         BLOOD DISEASE    Diabetes Mother     Heart disease Mother     Lung cancer Father     Cancer Father     Coronary artery disease Sister         PREMATURE       Social History     Socioeconomic History    Marital status:    Tobacco Use    Smoking status: Former     Current packs/day: 0.00     Average packs/day: 0.5 packs/day for 27.0 years (13.5 ttl pk-yrs)     Types: Cigarettes      Start date: 1975     Quit date:      Years since quittin.3    Smokeless tobacco: Never   Vaping Use    Vaping status: Never Used   Substance and Sexual Activity    Alcohol use: Yes     Alcohol/week: 10.0 standard drinks of alcohol     Types: 10 Glasses of wine per week    Drug use: Never    Sexual activity: Not Currently     Partners: Male     Birth control/protection: Post-menopausal           Objective   Physical Exam  Vitals and nursing note reviewed.   Constitutional:       General: She is not in acute distress.     Appearance: She is well-developed. She is not diaphoretic.      Comments: Nontoxic-appearing female   HENT:      Head: Normocephalic and atraumatic.      Comments: Posterior oropharynx is clear and without erythema or exudate, uvula is midline, normal voice  Eyes:      Pupils: Pupils are equal, round, and reactive to light.   Neck:      Comments: No meningeal signs or nuchal rigidity  Cardiovascular:      Rate and Rhythm: Normal rate and regular rhythm.      Heart sounds: Normal heart sounds. No murmur heard.     No friction rub. No gallop.   Pulmonary:      Effort: Pulmonary effort is normal. No respiratory distress.      Breath sounds: Normal breath sounds. No wheezing or rales.   Abdominal:      General: Bowel sounds are normal. There is no distension.      Palpations: Abdomen is soft. There is no mass.      Tenderness: There is no abdominal tenderness. There is no guarding or rebound.      Comments: No focal abdominal tenderness, no peritoneal signs, no pain out of proportion to exam   Musculoskeletal:         General: Normal range of motion.      Cervical back: Neck supple.   Skin:     General: Skin is warm and dry.      Findings: No erythema or rash.   Neurological:      Mental Status: She is alert and oriented to person, place, and time.      Comments: Awake, alert, and oriented x3, clear and fluent speech, no ataxia or dysmetria, normal gait, neurovascularly intact distally in  all fours with bounding distal pulses and normal sensation, 5 out of 5 strength in all fours, no cranial nerve deficits noted with cranial nerves II through XII grossly intact   Psychiatric:         Mood and Affect: Mood normal.         Thought Content: Thought content normal.         Judgment: Judgment normal.         Lumbar Puncture    Date/Time: 5/20/2024 8:19 PM    Performed by: Man Moreland MD  Authorized by: Man Moreland MD    Consent:     Consent obtained:  Written and verbal    Consent given by:  Patient    Risks, benefits, and alternatives were discussed: yes      Risks discussed:  Bleeding, headache, nerve damage, infection, pain and repeat procedure  Universal protocol:     Procedure explained and questions answered to patient or proxy's satisfaction: yes      Relevant documents present and verified: yes      Test results available: yes      Imaging studies available: yes      Required blood products, implants, devices, and special equipment available: yes      Immediately prior to procedure a time out was called: yes      Site/side marked: yes      Patient identity confirmed:  Verbally with patient and arm band  Pre-procedure details:     Procedure purpose:  Diagnostic    Preparation: Patient was prepped and draped in usual sterile fashion    Anesthesia:     Anesthesia method:  Local infiltration    Local anesthetic:  Lidocaine 1% WITH epi  Procedure details:     Lumbar space:  L4-L5 interspace    Patient position:  Sitting    Needle type:  Spinal needle - Quincke tip    Ultrasound guidance: no      Number of attempts:  5 or more    Total volume (ml):  0  Post-procedure details:     Puncture site:  Adhesive bandage applied    Procedure completion:  Procedure terminated electively by provider             ED Course  ED Course as of 05/20/24 2019   Mon May 20, 2024   1927 69-year-old female sent to the emergency department by her primary care physician to be evaluated for headache and fever.   The patient tells me that she began to feel ill about 10 days ago with a headache.  She then began experiencing fever.  Given her symptoms, she saw her primary care physician and was started on oral antibiotics but has not improved.  As result, she was sent here for evaluation today.  On arrival, the patient is afebrile but tachycardic.  No meningeal signs or nuchal rigidity.  No rash present.  Oropharynx is clear.  She tells me that she has had poor oral intake over the past several days given her symptoms.  Labs remarkable for white blood cell count of 13,000 as well as for acute kidney injury.  40% bands noted as well.  Procalcitonin is elevated at 1.8.  Respiratory viral panel is negative.  Urinalysis is somewhat equivocal.  Blood and urine cultures obtained. [DD]   1930 I personally and independently viewed the patient's x-ray images myself, and I am in agreement with the radiologist's reading for final interpretation, particularly there is no pneumonia noted. [DD]   1931 I personally and independently reviewed the patient's CT images and findings, and I am in agreement with the radiologist regarding CT interpretation--particularly there is no emergent intracranial process present. [DD]   1932 No obvious source noted based on initial workup.  After obtaining informed consent, I attempted a lumbar puncture multiple times without success.  As result, I reached out to Dr. Blackburn interventional radiology who graciously agreed to proceed with lumbar puncture under fluoroscopy.  Broad-spectrum antibiotics initiated.  IV fluids given.  CSF results are pending at this time.  I discussed the patient's case with our hospitalist, Dr. Greenberg, and the patient will be admitted under her care for further evaluation and treatment.  The patient is hemodynamically stable at this time and is aware/agreeable with the plan. [DD]      ED Course User Index  [DD] Man Moreland MD                                        Recent  Results (from the past 24 hour(s))   Comprehensive Metabolic Panel    Collection Time: 05/20/24  1:50 PM    Specimen: Blood   Result Value Ref Range    Glucose 118 (H) 65 - 99 mg/dL    BUN 36 (H) 8 - 23 mg/dL    Creatinine 2.00 (H) 0.57 - 1.00 mg/dL    Sodium 132 (L) 136 - 145 mmol/L    Potassium 4.2 3.5 - 5.2 mmol/L    Chloride 92 (L) 98 - 107 mmol/L    CO2 22.0 22.0 - 29.0 mmol/L    Calcium 9.2 8.6 - 10.5 mg/dL    Total Protein 7.4 6.0 - 8.5 g/dL    Albumin 3.9 3.5 - 5.2 g/dL    ALT (SGPT) 50 (H) 1 - 33 U/L    AST (SGOT) 26 1 - 32 U/L    Alkaline Phosphatase 115 39 - 117 U/L    Total Bilirubin 0.2 0.0 - 1.2 mg/dL    Globulin 3.5 gm/dL    A/G Ratio 1.1 g/dL    BUN/Creatinine Ratio 18.0 7.0 - 25.0    Anion Gap 18.0 (H) 5.0 - 15.0 mmol/L    eGFR 26.9 (L) >60.0 mL/min/1.73   Lactic Acid, Plasma    Collection Time: 05/20/24  1:50 PM    Specimen: Blood   Result Value Ref Range    Lactate 1.8 0.5 - 2.0 mmol/L   Procalcitonin    Collection Time: 05/20/24  1:50 PM    Specimen: Blood   Result Value Ref Range    Procalcitonin 1.89 (H) 0.00 - 0.25 ng/mL   T4, Free    Collection Time: 05/20/24  1:50 PM    Specimen: Blood   Result Value Ref Range    Free T4 1.25 0.92 - 1.68 ng/dL   TSH    Collection Time: 05/20/24  1:50 PM    Specimen: Blood   Result Value Ref Range    TSH 2.020 0.270 - 4.200 uIU/mL   Magnesium    Collection Time: 05/20/24  1:50 PM    Specimen: Blood   Result Value Ref Range    Magnesium 2.1 1.6 - 2.4 mg/dL   Mononucleosis Screen    Collection Time: 05/20/24  1:50 PM    Specimen: Blood   Result Value Ref Range    Monospot Negative Negative   CBC Auto Differential    Collection Time: 05/20/24  1:50 PM    Specimen: Blood   Result Value Ref Range    WBC 13.99 (H) 3.40 - 10.80 10*3/mm3    RBC 3.83 3.77 - 5.28 10*6/mm3    Hemoglobin 12.2 12.0 - 15.9 g/dL    Hematocrit 37.9 34.0 - 46.6 %    MCV 99.0 (H) 79.0 - 97.0 fL    MCH 31.9 26.6 - 33.0 pg    MCHC 32.2 31.5 - 35.7 g/dL    RDW 12.9 12.3 - 15.4 %    RDW-SD 46.9  37.0 - 54.0 fl    MPV 9.5 6.0 - 12.0 fL    Platelets 609 (H) 140 - 450 10*3/mm3   Manual Differential    Collection Time: 05/20/24  1:50 PM    Specimen: Blood   Result Value Ref Range    Neutrophil % 46.0 42.7 - 76.0 %    Lymphocyte % 9.0 (L) 19.6 - 45.3 %    Monocyte % 3.0 (L) 5.0 - 12.0 %    Eosinophil % 0.0 (L) 0.3 - 6.2 %    Basophil % 0.0 0.0 - 1.5 %    Bands %  40.0 (H) 0.0 - 5.0 %    Atypical Lymphocyte % 2.0 0.0 - 5.0 %    Neutrophils Absolute 12.03 (H) 1.70 - 7.00 10*3/mm3    Lymphocytes Absolute 1.54 0.70 - 3.10 10*3/mm3    Monocytes Absolute 0.42 0.10 - 0.90 10*3/mm3    Eosinophils Absolute 0.00 0.00 - 0.40 10*3/mm3    Basophils Absolute 0.00 0.00 - 0.20 10*3/mm3    RBC Morphology Normal Normal    Vacuolated Neutrophils Mod/2+ None Seen    Platelet Estimate Increased Normal    Clumped Platelets Present None Seen    Large Platelets Slight/1+ None Seen   Respiratory Panel PCR w/COVID-19(SARS-CoV-2) [facility] In-House, NP Swab in [facility], 2 HR TAT - Swab, Nasopharynx    Collection Time: 05/20/24  2:34 PM    Specimen: Nasopharynx; Swab   Result Value Ref Range    ADENOVIRUS, PCR Not Detected Not Detected    Coronavirus 229E Not Detected Not Detected    Coronavirus HKU1 Not Detected Not Detected    Coronavirus NL63 Not Detected Not Detected    Coronavirus OC43 Not Detected Not Detected    COVID19 Not Detected Not Detected - Ref. Range    Human Metapneumovirus Not Detected Not Detected    Human Rhinovirus/Enterovirus Not Detected Not Detected    Influenza A PCR Not Detected Not Detected    Influenza B PCR Not Detected Not Detected    Parainfluenza Virus 1 Not Detected Not Detected    Parainfluenza Virus 2 Not Detected Not Detected    Parainfluenza Virus 3 Not Detected Not Detected    Parainfluenza Virus 4 Not Detected Not Detected    RSV, PCR Not Detected Not Detected    Bordetella pertussis pcr Not Detected Not Detected    Bordetella parapertussis PCR Not Detected Not Detected    Chlamydophila  pneumoniae PCR Not Detected Not Detected    Mycoplasma pneumo by PCR Not Detected Not Detected   Urinalysis With Culture If Indicated - Urine, Clean Catch    Collection Time: 05/20/24  2:41 PM    Specimen: Urine, Clean Catch   Result Value Ref Range    Color, UA Dark Yellow (A) Yellow, Straw    Appearance, UA Turbid (A) Clear    pH, UA <=5.0 5.0 - 8.0    Specific Gravity, UA 1.025 1.001 - 1.030    Glucose,  mg/dL (Trace) (A) Negative    Ketones, UA Trace (A) Negative    Bilirubin, UA Moderate (2+) (A) Negative    Blood, UA Negative Negative    Protein, UA 30 mg/dL (1+) (A) Negative    Leuk Esterase, UA Trace (A) Negative    Nitrite, UA Negative Negative    Urobilinogen, UA 1.0 E.U./dL 0.2 - 1.0 E.U./dL   Urinalysis, Microscopic Only - Urine, Clean Catch    Collection Time: 05/20/24  2:41 PM    Specimen: Urine, Clean Catch   Result Value Ref Range    RBC, UA 3-5 (A) None Seen, 0-2 /HPF    WBC, UA 21-50 (A) None Seen, 0-2 /HPF    Bacteria, UA 2+ (A) None Seen, Trace /HPF    Squamous Epithelial Cells, UA 21-30 (A) None Seen, 0-2 /HPF    Hyaline Casts, UA None Seen 0 - 6 /LPF    Methodology Manual Light Microscopy    ECG 12 Lead Tachycardia    Collection Time: 05/20/24  2:53 PM   Result Value Ref Range    QT Interval 322 ms    QTC Interval 419 ms   Protein, CSF - Cerebrospinal Fluid, Lumbar Puncture    Collection Time: 05/20/24  6:30 PM    Specimen: Lumbar Puncture; Cerebrospinal Fluid   Result Value Ref Range    Protein, Total (CSF) 95.3 (H) 15.0 - 45.0 mg/dL   Glucose, CSF - Cerebrospinal Fluid, Lumbar Puncture    Collection Time: 05/20/24  6:30 PM    Specimen: Lumbar Puncture; Cerebrospinal Fluid   Result Value Ref Range    Glucose, CSF 64 40 - 70 mg/dL   CSF Tube - Cerebrospinal Fluid, Lumbar Puncture    Collection Time: 05/20/24  6:30 PM    Specimen: Lumbar Puncture; Cerebrospinal Fluid   Result Value Ref Range    Extra Tube Hold for add-ons.    Cell Count, CSF - Cerebrospinal Fluid, Lumbar Puncture     "Collection Time: 05/20/24  6:30 PM    Specimen: Lumbar Puncture; Cerebrospinal Fluid   Result Value Ref Range    WBC, CSF 29 (H) 0 - 5 /mm3    RBC, CSF 20,000 (H) 0 - 5 /mm3    Color, CSF Red (A) Colorless    Supernatant Color, CSF Colorless Colorless    Appearance, CSF Cloudy (A) Clear    Volume, CSF 10.3 mL    Tube Number, CSF 4      Note: In addition to lab results from this visit, the labs listed above may include labs taken at another facility or during a different encounter within the last 24 hours. Please correlate lab times with ED admission and discharge times for further clarification of the services performed during this visit.    CT Head Without Contrast   Final Result   Impression:   No acute intracranial abnormality.            Electronically Signed: Ruperto Chavez MD     5/20/2024 3:34 PM EDT     Workstation ID: [id number]      XR Chest 1 View   Final Result   Impression:   1. No acute cardiopulmonary abnormality.         Electronically Signed: Burke Ray     5/20/2024 2:07 PM EDT     Workstation ID: [id number]        Vitals:    05/20/24 1314 05/20/24 1759 05/20/24 1800   BP: 105/55 112/67    BP Location: Left arm     Patient Position: Sitting     Pulse: 108 105 105   Resp: 14     Temp: 97.9 °F (36.6 °C)     TempSrc: Oral     SpO2: 96% 96% 97%   Weight: 53.5 kg (118 lb)     Height: 160 cm (63\")       Medications   vancomycin (VANCOCIN) 1,000 mg in sodium chloride 0.9 % 250 mL IVPB-VTB (has no administration in time range)   cefTRIAXone (ROCEPHIN) 2,000 mg in sodium chloride 0.9 % 100 mL MBP (has no administration in time range)   ampicillin 2000 mg IVPB in 100 mL NS (MBP) (has no administration in time range)   sodium chloride 0.9 % bolus 1,000 mL (has no administration in time range)   sodium chloride 0.9 % bolus 1,000 mL (0 mL Intravenous Stopped 5/20/24 7684)   lidocaine 1% - EPINEPHrine 1:863797 (XYLOCAINE W/EPI) 1 %-1:384184 injection 10 mL (10 mL Injection Given by Other 5/20/24 1700) "     ECG/EMG Results (last 24 hours)       Procedure Component Value Units Date/Time    ECG 12 Lead Tachycardia [[id number]] Collected: 05/20/24 1453     Updated: 05/20/24 1918     QT Interval 322 ms      QTC Interval 419 ms     Narrative:      Test Reason : Tachycardia  Blood Pressure :   */*   mmHG  Vent. Rate : 102 BPM     Atrial Rate : 102 BPM     P-R Int : 142 ms          QRS Dur :  76 ms      QT Int : 322 ms       P-R-T Axes :  62  62  45 degrees     QTc Int : 419 ms    Sinus tachycardia  Possible Left atrial enlargement  Nonspecific T wave abnormality  Abnormal ECG  No previous ECGs available  Confirmed by MD Moreland Michael ([id number]) on 5/20/2024 7:17:55 PM    Referred By: LEV WASSERMAN           Confirmed By: Mark Moreland MD          ECG 12 Lead Tachycardia   Final Result   Test Reason : Tachycardia   Blood Pressure :   */*   mmHG   Vent. Rate : 102 BPM     Atrial Rate : 102 BPM      P-R Int : 142 ms          QRS Dur :  76 ms       QT Int : 322 ms       P-R-T Axes :  62  62  45 degrees      QTc Int : 419 ms      Sinus tachycardia   Possible Left atrial enlargement   Nonspecific T wave abnormality   Abnormal ECG   No previous ECGs available   Confirmed by MD Moreland Michael ([id number]) on 5/20/2024 7:17:55 PM      Referred By: LEV WASSERMAN           Confirmed By: Mark Moreland MD          Recent Results (from the past 24 hour(s))   Comprehensive Metabolic Panel    Collection Time: 05/20/24  1:50 PM    Specimen: Blood   Result Value Ref Range    Glucose 118 (H) 65 - 99 mg/dL    BUN 36 (H) 8 - 23 mg/dL    Creatinine 2.00 (H) 0.57 - 1.00 mg/dL    Sodium 132 (L) 136 - 145 mmol/L    Potassium 4.2 3.5 - 5.2 mmol/L    Chloride 92 (L) 98 - 107 mmol/L    CO2 22.0 22.0 - 29.0 mmol/L    Calcium 9.2 8.6 - 10.5 mg/dL    Total Protein 7.4 6.0 - 8.5 g/dL    Albumin 3.9 3.5 - 5.2 g/dL    ALT (SGPT) 50 (H) 1 - 33 U/L    AST (SGOT) 26 1 - 32 U/L    Alkaline Phosphatase 115 39 - 117 U/L    Total Bilirubin 0.2 0.0 - 1.2 mg/dL    Globulin 3.5 gm/dL    A/G  Ratio 1.1 g/dL    BUN/Creatinine Ratio 18.0 7.0 - 25.0    Anion Gap 18.0 (H) 5.0 - 15.0 mmol/L    eGFR 26.9 (L) >60.0 mL/min/1.73   Lactic Acid, Plasma    Collection Time: 05/20/24  1:50 PM    Specimen: Blood   Result Value Ref Range    Lactate 1.8 0.5 - 2.0 mmol/L   Procalcitonin    Collection Time: 05/20/24  1:50 PM    Specimen: Blood   Result Value Ref Range    Procalcitonin 1.89 (H) 0.00 - 0.25 ng/mL   T4, Free    Collection Time: 05/20/24  1:50 PM    Specimen: Blood   Result Value Ref Range    Free T4 1.25 0.92 - 1.68 ng/dL   TSH    Collection Time: 05/20/24  1:50 PM    Specimen: Blood   Result Value Ref Range    TSH 2.020 0.270 - 4.200 uIU/mL   Magnesium    Collection Time: 05/20/24  1:50 PM    Specimen: Blood   Result Value Ref Range    Magnesium 2.1 1.6 - 2.4 mg/dL   Mononucleosis Screen    Collection Time: 05/20/24  1:50 PM    Specimen: Blood   Result Value Ref Range    Monospot Negative Negative   CBC Auto Differential    Collection Time: 05/20/24  1:50 PM    Specimen: Blood   Result Value Ref Range    WBC 13.99 (H) 3.40 - 10.80 10*3/mm3    RBC 3.83 3.77 - 5.28 10*6/mm3    Hemoglobin 12.2 12.0 - 15.9 g/dL    Hematocrit 37.9 34.0 - 46.6 %    MCV 99.0 (H) 79.0 - 97.0 fL    MCH 31.9 26.6 - 33.0 pg    MCHC 32.2 31.5 - 35.7 g/dL    RDW 12.9 12.3 - 15.4 %    RDW-SD 46.9 37.0 - 54.0 fl    MPV 9.5 6.0 - 12.0 fL    Platelets 609 (H) 140 - 450 10*3/mm3   Manual Differential    Collection Time: 05/20/24  1:50 PM    Specimen: Blood   Result Value Ref Range    Neutrophil % 46.0 42.7 - 76.0 %    Lymphocyte % 9.0 (L) 19.6 - 45.3 %    Monocyte % 3.0 (L) 5.0 - 12.0 %    Eosinophil % 0.0 (L) 0.3 - 6.2 %    Basophil % 0.0 0.0 - 1.5 %    Bands %  40.0 (H) 0.0 - 5.0 %    Atypical Lymphocyte % 2.0 0.0 - 5.0 %    Neutrophils Absolute 12.03 (H) 1.70 - 7.00 10*3/mm3    Lymphocytes Absolute 1.54 0.70 - 3.10 10*3/mm3    Monocytes Absolute 0.42 0.10 - 0.90 10*3/mm3    Eosinophils Absolute 0.00 0.00 - 0.40 10*3/mm3    Basophils  Absolute 0.00 0.00 - 0.20 10*3/mm3    RBC Morphology Normal Normal    Vacuolated Neutrophils Mod/2+ None Seen    Platelet Estimate Increased Normal    Clumped Platelets Present None Seen    Large Platelets Slight/1+ None Seen   Respiratory Panel PCR w/COVID-19(SARS-CoV-2) [facility] In-House, NP Swab in UTM/VTM, 2 HR TAT - Swab, Nasopharynx    Collection Time: 05/20/24  2:34 PM    Specimen: Nasopharynx; Swab   Result Value Ref Range    ADENOVIRUS, PCR Not Detected Not Detected    Coronavirus 229E Not Detected Not Detected    Coronavirus HKU1 Not Detected Not Detected    Coronavirus NL63 Not Detected Not Detected    Coronavirus OC43 Not Detected Not Detected    COVID19 Not Detected Not Detected - Ref. Range    Human Metapneumovirus Not Detected Not Detected    Human Rhinovirus/Enterovirus Not Detected Not Detected    Influenza A PCR Not Detected Not Detected    Influenza B PCR Not Detected Not Detected    Parainfluenza Virus 1 Not Detected Not Detected    Parainfluenza Virus 2 Not Detected Not Detected    Parainfluenza Virus 3 Not Detected Not Detected    Parainfluenza Virus 4 Not Detected Not Detected    RSV, PCR Not Detected Not Detected    Bordetella pertussis pcr Not Detected Not Detected    Bordetella parapertussis PCR Not Detected Not Detected    Chlamydophila pneumoniae PCR Not Detected Not Detected    Mycoplasma pneumo by PCR Not Detected Not Detected   Urinalysis With Culture If Indicated - Urine, Clean Catch    Collection Time: 05/20/24  2:41 PM    Specimen: Urine, Clean Catch   Result Value Ref Range    Color, UA Dark Yellow (A) Yellow, Straw    Appearance, UA Turbid (A) Clear    pH, UA <=5.0 5.0 - 8.0    Specific Gravity, UA 1.025 1.001 - 1.030    Glucose,  mg/dL (Trace) (A) Negative    Ketones, UA Trace (A) Negative    Bilirubin, UA Moderate (2+) (A) Negative    Blood, UA Negative Negative    Protein, UA 30 mg/dL (1+) (A) Negative    Leuk Esterase, UA Trace (A) Negative    Nitrite, UA  Negative Negative    Urobilinogen, UA 1.0 E.U./dL 0.2 - 1.0 E.U./dL   Urinalysis, Microscopic Only - Urine, Clean Catch    Collection Time: 05/20/24  2:41 PM    Specimen: Urine, Clean Catch   Result Value Ref Range    RBC, UA 3-5 (A) None Seen, 0-2 /HPF    WBC, UA 21-50 (A) None Seen, 0-2 /HPF    Bacteria, UA 2+ (A) None Seen, Trace /HPF    Squamous Epithelial Cells, UA 21-30 (A) None Seen, 0-2 /HPF    Hyaline Casts, UA None Seen 0 - 6 /LPF    Methodology Manual Light Microscopy    ECG 12 Lead Tachycardia    Collection Time: 05/20/24  2:53 PM   Result Value Ref Range    QT Interval 322 ms    QTC Interval 419 ms   Protein, CSF - Cerebrospinal Fluid, Lumbar Puncture    Collection Time: 05/20/24  6:30 PM    Specimen: Lumbar Puncture; Cerebrospinal Fluid   Result Value Ref Range    Protein, Total (CSF) 95.3 (H) 15.0 - 45.0 mg/dL   Glucose, CSF - Cerebrospinal Fluid, Lumbar Puncture    Collection Time: 05/20/24  6:30 PM    Specimen: Lumbar Puncture; Cerebrospinal Fluid   Result Value Ref Range    Glucose, CSF 64 40 - 70 mg/dL   CSF Tube - Cerebrospinal Fluid, Lumbar Puncture    Collection Time: 05/20/24  6:30 PM    Specimen: Lumbar Puncture; Cerebrospinal Fluid   Result Value Ref Range    Extra Tube Hold for add-ons.    Cell Count, CSF - Cerebrospinal Fluid, Lumbar Puncture    Collection Time: 05/20/24  6:30 PM    Specimen: Lumbar Puncture; Cerebrospinal Fluid   Result Value Ref Range    WBC, CSF 29 (H) 0 - 5 /mm3    RBC, CSF 20,000 (H) 0 - 5 /mm3    Color, CSF Red (A) Colorless    Supernatant Color, CSF Colorless Colorless    Appearance, CSF Cloudy (A) Clear    Volume, CSF 10.3 mL    Tube Number, CSF 4      Note: In addition to lab results from this visit, the labs listed above may include labs taken at another facility or during a different encounter within the last 24 hours. Please correlate lab times with ED admission and discharge times for further clarification of the services performed during this visit.    CT  "Head Without Contrast   Final Result   Impression:   No acute intracranial abnormality.            Electronically Signed: Ruperto Chavez MD     5/20/2024 3:34 PM EDT     Workstation ID: [id number]      XR Chest 1 View   Final Result   Impression:   1. No acute cardiopulmonary abnormality.         Electronically Signed: Burke Ray     5/20/2024 2:07 PM EDT     Workstation ID: [id number]        Vitals:    05/20/24 1314 05/20/24 1759 05/20/24 1800   BP: 105/55 112/67    BP Location: Left arm     Patient Position: Sitting     Pulse: 108 105 105   Resp: 14     Temp: 97.9 °F (36.6 °C)     TempSrc: Oral     SpO2: 96% 96% 97%   Weight: 53.5 kg (118 lb)     Height: 160 cm (63\")       Medications   vancomycin (VANCOCIN) 1,000 mg in sodium chloride 0.9 % 250 mL IVPB-VTB (has no administration in time range)   cefTRIAXone (ROCEPHIN) 2,000 mg in sodium chloride 0.9 % 100 mL MBP (has no administration in time range)   ampicillin 2000 mg IVPB in 100 mL NS (MBP) (has no administration in time range)   sodium chloride 0.9 % bolus 1,000 mL (has no administration in time range)   sodium chloride 0.9 % bolus 1,000 mL (0 mL Intravenous Stopped 5/20/24 1744)   lidocaine 1% - EPINEPHrine 1:175776 (XYLOCAINE W/EPI) 1 %-1:412395 injection 10 mL (10 mL Injection Given by Other 5/20/24 1700)     ECG/EMG Results (last 24 hours)       Procedure Component Value Units Date/Time    ECG 12 Lead Tachycardia [[id number]] Collected: 05/20/24 1453     Updated: 05/20/24 1918     QT Interval 322 ms      QTC Interval 419 ms     Narrative:      Test Reason : Tachycardia  Blood Pressure :   */*   mmHG  Vent. Rate : 102 BPM     Atrial Rate : 102 BPM     P-R Int : 142 ms          QRS Dur :  76 ms      QT Int : 322 ms       P-R-T Axes :  62  62  45 degrees     QTc Int : 419 ms    Sinus tachycardia  Possible Left atrial enlargement  Nonspecific T wave abnormality  Abnormal ECG  No previous ECGs available  Confirmed by MD Aniceto, Mark ([id number]) on 5/20/2024 " 7:17:55 PM    Referred By: ED MD           Confirmed By: Mark Moreland MD          ECG 12 Lead Tachycardia   Final Result   Test Reason : Tachycardia   Blood Pressure :   */*   mmHG   Vent. Rate : 102 BPM     Atrial Rate : 102 BPM      P-R Int : 142 ms          QRS Dur :  76 ms       QT Int : 322 ms       P-R-T Axes :  62  62  45 degrees      QTc Int : 419 ms      Sinus tachycardia   Possible Left atrial enlargement   Nonspecific T wave abnormality   Abnormal ECG   No previous ECGs available   Confirmed by MD Moreland Michael ([id number]) on 5/20/2024 7:17:55 PM      Referred By: ED MD           Confirmed By: Mark Moreland MD                 Medical Decision Making  Amount and/or Complexity of Data Reviewed  Labs: ordered.  Radiology: ordered.  ECG/medicine tests: ordered.    Risk  Prescription drug management.  Decision regarding hospitalization.        Final diagnoses:   SIRS (systemic inflammatory response syndrome)   Leukocytosis, unspecified type   BLADE (acute kidney injury)   Headache, unspecified headache type       ED Disposition  ED Disposition       ED Disposition   Decision to Admit    Condition   --    Comment   --               No follow-up provider specified.       Medication List      No changes were made to your prescriptions during this visit.            Man Moreland MD  05/20/24 2021

## 2024-05-21 NOTE — H&P
[facility] Medicine Services  HISTORY AND PHYSICAL    Patient Name: Linda Good  : 1955  MRN: [id number]  Primary Care Physician: Julissa Vernon PA-C  Date of admission: 2024      Subjective   Subjective     Chief Complaint:  Fever, h/a    HPI:  Linda Good is a 69 y.o. female with h/o HTN and HL reports fever up to 101.9 and off/on generalized headache x about 12 days.  States when she felt like this before it was d/t UTI and so was seen at [facility] about about 9 days ago and was given macrobid but wasn't improving so PCP changed to cefidinir about 6 days ago.  Patient has continued to have off/on fevers, night sweats, myalgias, h/a, fatigue, and loss of appetite.  She denies any cough or SOA.  [place] but has not been on any hikes recently.  Has not noticed any bug or tick bites or rashes.  Has not traveled recently.  Reports that had an illness similar to this about 20 years ago and so ID then but etiology was never determined.       Personal History     Past Medical History:   Diagnosis Date    Diabetes mellitus 3/1/1993    Gestational    Hyperlipidemia     Hypertension     Pregnancy     ,,,           Past Surgical History:   Procedure Laterality Date    BREAST SURGERY      Lumpectomy/Benign    COLONOSCOPY      TUBAL ABDOMINAL LIGATION         Family History: family history includes Cancer in her father; Coronary artery disease in her sister; Diabetes in her mother; Heart disease in her mother; Lung cancer in her father; Other in her mother.     Social History:  reports that she quit smoking about 22 years ago. Her smoking use included cigarettes. She started smoking about 49 years ago. She has a 13.5 pack-year smoking history. She has never used smokeless tobacco. She reports current alcohol use of about 10.0 standard drinks of alcohol per week. She reports that she does not use  drugs.  Social History     Social History Narrative    Not on file       Medications:  Available home medication information reviewed.  calcium carbonate, cefdinir, fenofibrate, hydroCHLOROthiazide, ibuprofen, lisinopril, rosuvastatin, vitamin B-1, vitamin C, and vitamin E    No Known Allergies    Objective   Objective     Vital Signs:   Temp:  [97.9 °F (36.6 °C)-98.9 °F (37.2 °C)] 98.9 °F (37.2 °C)  Heart Rate:  [100-108] 100  Resp:  [14-16] 16  BP: (100-135)/(55-70) 135/65       Physical Exam   Constitutional: Awake, alert  Eyes: PERRLA, sclerae anicteric, no conjunctival injection  HENT: NCAT, mucous membranes moist  Neck: Supple, no thyromegaly, no lymphadenopathy, trachea midline  Respiratory: Clear to auscultation bilaterally, nonlabored respirations   Cardiovascular: RRR, no murmurs, rubs, or gallops, palpable pedal pulses bilaterally  Gastrointestinal: Positive bowel sounds, soft, nontender, nondistended  Musculoskeletal: No bilateral ankle edema, no clubbing or cyanosis to extremities  Psychiatric: Appropriate affect, cooperative  Neurologic: Oriented x 3, strength symmetric in all extremities, Cranial Nerves grossly intact to confrontation, speech clear  Skin: No rashes      Result Review:  I have personally reviewed the results from the time of this admission to 5/20/2024 22:07 EDT and agree with these findings:  [x]  Laboratory list / accordion  [x]  Microbiology  [x]  Radiology  []  EKG/Telemetry   []  Cardiology/Vascular   []  Pathology  []  Old records  []  Other:  Most notable findings include:       LAB RESULTS:      Lab 05/20/24  1350   WBC 13.99*   HEMOGLOBIN 12.2   HEMATOCRIT 37.9   PLATELETS 609*   NEUTROS ABS 12.03*   EOS ABS 0.00   MCV 99.0*   PROCALCITONIN 1.89*   LACTATE 1.8         Lab 05/20/24  1350   SODIUM 132*   POTASSIUM 4.2   CHLORIDE 92*   CO2 22.0   ANION GAP 18.0*   BUN 36*   CREATININE 2.00*   EGFR 26.9*   GLUCOSE 118*   CALCIUM 9.2   MAGNESIUM 2.1   TSH 2.020         Lab  05/20/24  1350   TOTAL PROTEIN 7.4   ALBUMIN 3.9   GLOBULIN 3.5   ALT (SGPT) 50*   AST (SGOT) 26   BILIRUBIN 0.2   ALK PHOS 115                     UA          5/14/2024    14:37 5/20/2024    14:41   Urinalysis   Squamous Epithelial Cells, UA  21-30    Specific Gravity, UA  1.025    Ketones, UA Negative  Trace    Blood, UA  Negative    Leukocytes, UA Trace  Trace    Nitrite, UA  Negative    RBC, UA  3-5    WBC, UA  21-50    Bacteria, UA  2+        Microbiology Results (last 10 days)       Procedure Component Value - Date/Time    Culture, CSF - Cerebrospinal Fluid, Lumbar Puncture [[id number]] Collected: 05/20/24 1830    Lab Status: Preliminary result Specimen: Cerebrospinal Fluid from Lumbar Puncture Updated: 05/20/24 2039     Gram Stain Moderate (3+) Red blood cells      Occasional WBCs seen      No organisms seen    Meningitis / Encephalitis Panel, PCR - Cerebrospinal Fluid, Lumbar Puncture [[id number]]  (Normal) Collected: 05/20/24 1830    Lab Status: Final result Specimen: Cerebrospinal Fluid from Lumbar Puncture Updated: 05/20/24 2115     ESCHERICHIA COLI K1, PCR Not Detected     HAEMOPHILUS INFLUENZAE, PCR Not Detected     LISTERIA MONOCYTOGENES, PCR Not Detected     NEISSERIA MENINGITIDIS, PCR Not Detected     STREPTOCOCCUS AGALACTIAE, PCR Not Detected     STREPTOCOCCUS PNEUMONIAE, PCR Not Detected     CYTOMEGALOVIRUS (CMV), PCR Not Detected     ENTEROVIRUS, PCR Not Detected     HERPES SIMPLEX VIRUS 1 (HSV-1), PCR Not Detected     HERPES SIMPLEX VIRUS 2 (HSV-2), PCR Not Detected     HUMAN PARECHOVIRUS, PCR Not Detected     VARICELLA ZOSTER VIRUS (VZV), PCR Not Detected     CRYPTOCOCCUS NEOFORMANS / GATTII, PCR Not Detected     HUMAN HERPES VIRUS 6 PCR Not Detected    Respiratory Panel PCR w/COVID-19(SARS-CoV-2) MIKEY/YENI/VENTURA/PAD/COR/LEONA In-House, NP Swab in UTM/VTM, 2 HR TAT - Swab, Nasopharynx [[id number]]  (Normal) Collected: 05/20/24 1434    Lab Status: Final result Specimen: Swab from Nasopharynx Updated:  05/20/24 1546     ADENOVIRUS, PCR Not Detected     Coronavirus 229E Not Detected     Coronavirus HKU1 Not Detected     Coronavirus NL63 Not Detected     Coronavirus OC43 Not Detected     COVID19 Not Detected     Human Metapneumovirus Not Detected     Human Rhinovirus/Enterovirus Not Detected     Influenza A PCR Not Detected     Influenza B PCR Not Detected     Parainfluenza Virus 1 Not Detected     Parainfluenza Virus 2 Not Detected     Parainfluenza Virus 3 Not Detected     Parainfluenza Virus 4 Not Detected     RSV, PCR Not Detected     Bordetella pertussis pcr Not Detected     Bordetella parapertussis PCR Not Detected     Chlamydophila pneumoniae PCR Not Detected     Mycoplasma pneumo by PCR Not Detected    Narrative:      In the setting of a positive respiratory panel with a viral infection PLUS a negative procalcitonin without other underlying concern for bacterial infection, consider observing off antibiotics or discontinuation of antibiotics and continue supportive care. If the respiratory panel is positive for atypical bacterial infection (Bordetella pertussis, Chlamydophila pneumoniae, or Mycoplasma pneumoniae), consider antibiotic de-escalation to target atypical bacterial infection.    Urine Culture - Urine, Urine, Clean Catch [[id number]] Collected: 05/14/24 1439    Lab Status: Final result Specimen: Urine, Clean Catch Updated: 05/17/24 0712     Urine Culture Final report     Result 1 Comment     Comment: Mixed urogenital maco  50,000-100,000 colony forming units per mL         Narrative:      Performed at:  [address] [place] [address]  [id number]  : Zia Pearson PhD, Phone:  [phone]            Invasive peripheral vascular study    Result Date: 5/20/2024  Clinical indication: 69-year-old female with history of UTI, fever/headache, concern for meningitis.  Failed prior lumbar puncture. :  Dr. Chowdary Given Procedures:  Fluoroscopic guidance lumbar puncture  diagnosis Access: 18-gauge-gauge spinal needle in the thecal sac at the mid lumbar region. Contrast: none Estimated Blood Loss:  Negligible Complication:  None Apparent. Technique:  Formal written consent for the procedure was obtained from the patient's/patient's family after explaining risks to include bleeding, infection, spinal nerve/cord injury, and headache.  The lumbar region was prepped and draped in the usual, sterile fashion.  Local anesthesia with 1% lidocaine infiltration was achieved.  Utilizing fluoroscopic guidance, a 18-gauge spinal needle was placed into the thecal sac of the mid lumbar region without difficulty.  Blood-tinged CSF was encountered, with an opening pressure less than 15 cm of water in the prone position.  Approximately 10 milliliters of blood-tinged CSF was drawn off into 4 tubes without difficulty.  CSF was sent for microbiology and/or cytologic/pathologic analysis, per the referring service.  The patient tolerated the procedure well and without apparent complication.     Impression:   Technically successful fluoroscopic guided lumbar puncture for diagnosis.  10 cc of blood-tinged CSF was sent for culture/labs, per the referring service.     CT Head Without Contrast    Result Date: 5/20/2024  CT HEAD WO CONTRAST Date of Exam: 5/20/2024 3:21 PM EDT Indication: HA, gen weakness. Comparison: None available. Technique: Axial CT images were obtained of the head without contrast administration.  Automated exposure control and iterative construction methods were used. Findings: Gray-white differentiation is maintained and there is no evidence of intracranial hemorrhage, mass or mass effect. The ventricles are normal in size and configuration. The orbits are normal. The paranasal sinuses are clear. The calvarium is intact.     Impression: Impression: No acute intracranial abnormality. Electronically Signed: Ruperto Chavez MD  5/20/2024 3:34 PM EDT  Workstation ID: [id number]    XR Chest 1  View    Result Date: 5/20/2024  XR CHEST 1 VW Date of Exam: 5/20/2024 1:45 PM EDT Indication: Generalized weakness Comparison: None available. Findings: The cardiomediastinal silhouette is within normal limits. Pulmonary vascularity appears normal. There is no focal airspace consolidation, pleural effusion, or pneumothorax. There are calcified lymph nodes within the left hilar region likely related to chronic granulomatous disease. There are degenerative changes of the thoracic spine.     Impression: Impression: 1. No acute cardiopulmonary abnormality. Electronically Signed: Burke Ray  5/20/2024 2:07 PM EDT  Workstation ID: [id number]         Assessment & Plan   Assessment & Plan       Fever    Primary hypertension    BLADE (acute kidney injury)      Linda Good is a 69 y.o. female with h/o HTN and HL reports fever up to 101.9 and off/on generalized headache, myalgias, night sweats, fatigue, and loss of appetite x about 12 days    Sepsis with Bandemia  FUO  --s/p LP with negative meningitis panel.  ?viral  --s/p recent macrobid and then cefdinir for presumed UTI  --s/p vanc, rocephin, and ampicillin in ER.  Will continue vanc per pharm and rocephin empiric for now  --negative for COVID, mono, and flu.  Respiratory panel negative  --follow cultures  --no hardware, [place] known tick [place]  --consult ID in AM  --order echo, KAYLA panel    BLADE  --hold HCTZ and lisinopril  --IVF    HTN  HL  --hold home HCTZ and lisinopril d/t above          DVT prophylaxis:  Medical DVT prophylaxis orders are present.          CODE STATUS:    Code Status and Medical Interventions:   Ordered at: 05/20/24 9665     Level Of Support Discussed With:    Patient     Code Status (Patient has no pulse and is not breathing):    CPR (Attempt to Resuscitate)     Medical Interventions (Patient has pulse or is breathing):    Full Support       Expected Discharge   Expected discharge date/ time has not been documented.      Jass Greenberg MD  05/20/24

## 2024-05-21 NOTE — PROGRESS NOTES
"Pharmacy Consult-Vancomycin Dosing  Linda Good is a  69 y.o. female receiving vancomycin therapy.     Indication: CNS infection  Consulting Provider: hospitalist  ID Consult: Yes    Goal Trough: 15-20    Current Antimicrobial Therapy  Anti-Infectives (From admission, onward)      Ordered     Dose/Rate Route Frequency Start Stop    05/20/24 2153  cefTRIAXone (ROCEPHIN) 2,000 mg in sodium chloride 0.9 % 100 mL MBP        Ordering Provider: Jass Greenberg MD    2,000 mg  200 mL/hr over 30 Minutes Intravenous Every 12 Hours Scheduled 05/21/24 0900 05/28/24 0859    05/20/24 2212  vancomycin (dosing per levels)        Ordering Provider: Dennys Parker PharmD   Placed in \"And\" Linked Group     Does not apply Daily 05/21/24 0900 05/27/24 0859    05/20/24 2153  Pharmacy to dose vancomycin        Ordering Provider: Jass Greenberg MD     Does not apply Continuous PRN 05/20/24 2152 05/27/24 2151 05/20/24 1815  vancomycin (VANCOCIN) 1,000 mg in sodium chloride 0.9 % 250 mL IVPB-VTB        Ordering Provider: Epi Blackburn MD    20 mg/kg × 53.5 kg  250 mL/hr over 60 Minutes Intravenous Once 05/20/24 1915      05/20/24 1818  ampicillin 2000 mg IVPB in 100 mL NS (MBP)        Ordering Provider: Epi Blackburn MD    2 g  200 mL/hr over 30 Minutes Intravenous Once 05/20/24 1834      05/20/24 1815  cefTRIAXone (ROCEPHIN) 2,000 mg in sodium chloride 0.9 % 100 mL MBP        Ordering Provider: Man Moreland MD    2,000 mg  200 mL/hr over 30 Minutes Intravenous Once 05/20/24 1831 05/20/24 2056            Allergies  Allergies as of 05/20/2024    (No Known Allergies)       Labs  Results from last 7 days   Lab Units 05/20/24  1350   BUN mg/dL 36*   CREATININE mg/dL 2.00*     Results from last 7 days   Lab Units 05/20/24  1350   WBC 10*3/mm3 13.99*       Evaluation of Dosing  Last Dose Received in the ED/Outside Facility:        N/A  Is Patient on Dialysis or Renal Replacement:        No -- BLADE on admission    Ht - " "160 cm (63\")  Wt - 53.5 kg (118 lb)    Estimated Creatinine Clearance: 23.1 mL/min (A) (by C-G formula based on SCr of 2 mg/dL (H)).  Intake & Output (last 3 days)       None            Microbiology and Radiology  Microbiology Results (last 10 days)       Procedure Component Value - Date/Time    Culture, CSF - Cerebrospinal Fluid, Lumbar Puncture [[id number]] Collected: 05/20/24 1830    Lab Status: Preliminary result Specimen: Cerebrospinal Fluid from Lumbar Puncture Updated: 05/20/24 2039     Gram Stain Moderate (3+) Red blood cells      Occasional WBCs seen      No organisms seen    Meningitis / Encephalitis Panel, PCR - Cerebrospinal Fluid, Lumbar Puncture [[id number]]  (Normal) Collected: 05/20/24 1830    Lab Status: Final result Specimen: Cerebrospinal Fluid from Lumbar Puncture Updated: 05/20/24 2115     ESCHERICHIA COLI K1, PCR Not Detected     HAEMOPHILUS INFLUENZAE, PCR Not Detected     LISTERIA MONOCYTOGENES, PCR Not Detected     NEISSERIA MENINGITIDIS, PCR Not Detected     STREPTOCOCCUS AGALACTIAE, PCR Not Detected     STREPTOCOCCUS PNEUMONIAE, PCR Not Detected     CYTOMEGALOVIRUS (CMV), PCR Not Detected     ENTEROVIRUS, PCR Not Detected     HERPES SIMPLEX VIRUS 1 (HSV-1), PCR Not Detected     HERPES SIMPLEX VIRUS 2 (HSV-2), PCR Not Detected     HUMAN PARECHOVIRUS, PCR Not Detected     VARICELLA ZOSTER VIRUS (VZV), PCR Not Detected     CRYPTOCOCCUS NEOFORMANS / GATTII, PCR Not Detected     HUMAN HERPES VIRUS 6 PCR Not Detected    Respiratory Panel PCR w/COVID-19(SARS-CoV-2) MIKEY/YENI/VENTURA/PAD/COR/LEONA In-House, NP Swab in UTM/VTM, 2 HR TAT - Swab, Nasopharynx [[id number]]  (Normal) Collected: 05/20/24 1434    Lab Status: Final result Specimen: Swab from Nasopharynx Updated: 05/20/24 1546     ADENOVIRUS, PCR Not Detected     Coronavirus 229E Not Detected     Coronavirus HKU1 Not Detected     Coronavirus NL63 Not Detected     Coronavirus OC43 Not Detected     COVID19 Not Detected     Human Metapneumovirus Not " Detected     Human Rhinovirus/Enterovirus Not Detected     Influenza A PCR Not Detected     Influenza B PCR Not Detected     Parainfluenza Virus 1 Not Detected     Parainfluenza Virus 2 Not Detected     Parainfluenza Virus 3 Not Detected     Parainfluenza Virus 4 Not Detected     RSV, PCR Not Detected     Bordetella pertussis pcr Not Detected     Bordetella parapertussis PCR Not Detected     Chlamydophila pneumoniae PCR Not Detected     Mycoplasma pneumo by PCR Not Detected    Narrative:      In the setting of a positive respiratory panel with a viral infection PLUS a negative procalcitonin without other underlying concern for bacterial infection, consider observing off antibiotics or discontinuation of antibiotics and continue supportive care. If the respiratory panel is positive for atypical bacterial infection (Bordetella pertussis, Chlamydophila pneumoniae, or Mycoplasma pneumoniae), consider antibiotic de-escalation to target atypical bacterial infection.    Urine Culture - Urine, Urine, Clean Catch [[id number]] Collected: 05/14/24 1439    Lab Status: Final result Specimen: Urine, Clean Catch Updated: 05/17/24 0712     Urine Culture Final report     Result 1 Comment     Comment: Mixed urogenital maco  50,000-100,000 colony forming units per mL         Narrative:      Performed at:  [address] [place] [address]  [id number]  : Zia Pearson PhD, Phone:  [phone]            Vancomycin Levels:                Assessment/Plan:  Will initiate dose at 1000 mg IV once       followed by  Vancomycin Random level 5/21 at noon (~12 hours after initial dose)    Pharmacy will order additional vancomycin doses based on vancomycin random level result.    Dennys Parker, NomiD, BCPS  5/20/2024  22:15 EDT

## 2024-05-21 NOTE — CASE MANAGEMENT/SOCIAL WORK
Discharge Planning Assessment  [facility]     Patient Name: Linda Good  MRN: [id number]  Today's Date: 5/21/2024    Admit Date: 5/20/2024    Plan: Home   Discharge Needs Assessment       Row Name 05/21/24 1500       Living Environment    People in Home spouse    Name(s) of People in Home Vel Good    Current Living Arrangements home    Potentially Unsafe Housing Conditions none    Primary Care Provided by self    Provides Primary Care For no one    Family Caregiver if Needed spouse    Family Caregiver Names Vel Good    Quality of Family Relationships involved;helpful;supportive    Able to Return to Prior Arrangements yes       Resource/Environmental Concerns    Resource/Environmental Concerns none       Transition Planning    Patient/Family Anticipates Transition to home    Patient/Family Anticipated Services at Transition none    Transportation Anticipated family or friend will provide       Discharge Needs Assessment    Readmission Within the Last 30 Days no previous admission in last 30 days    Equipment Currently Used at Home bp cuff;shower chair;grab bar    Concerns to be Addressed denies needs/concerns at this time;discharge planning    Anticipated Changes Related to Illness none    Equipment Needed After Discharge none    Discharge Coordination/Progress Home                   Discharge Plan       Row Name 05/21/24 1501       Plan    Plan Home    Patient/Family in Agreement with Plan yes    Plan Comments Spoke with patient spouse via telephone regarding discharge planning.  Patient denies use of HH and has a Blood Pressure machine, Shower Chair and Grab Bars in the bathroom.  He indicates she has prescription coverage and medications are affordable.  They live together in a multilevel house with bedroom upstairs and two steps to enter.  He reports their son lives upstairs and they rarely access.  Patient was independent at home prior to admission.  No immediate discharge needs verbalized.  KIMO  following.  Patient plan is to discharge home via car with family to transport.    Final Discharge Disposition Code 01 - home or self-care                  Continued Care and Services - Admitted Since 5/20/2024    No active coordination exists for this encounter.          Demographic Summary       Row Name 05/21/24 8010       General Information    Admission Type observation    Arrived From home;emergency department    Referral Source admission list    Reason for Consult discharge planning    Preferred Language English    General Information Comments Julissa Vernon PA-C       Contact Information    Permission Granted to Share Info With     Contact Information Comments Vel Good, spouse  [phone]                   Functional Status       Row Name 05/21/24 1500       Functional Status    Usual Activity Tolerance good    Current Activity Tolerance good       Functional Status, IADL    Medications independent    Meal Preparation independent    Housekeeping independent    Laundry independent    Shopping independent       Employment/    Employment/ Comments [employer] Replacement                   Psychosocial    No documentation.                  Abuse/Neglect    No documentation.                  Legal    No documentation.                  Substance Abuse    No documentation.                  Patient Forms    No documentation.                     Annette Bradford RN

## 2024-05-21 NOTE — CONSULTS
INFECTIOUS DISEASE CONSULT/INITIAL HOSPITAL VISIT    Linda Good  1955  [id number]    Date of consult: 5/21/2024    Admit date: 5/20/2024    Requesting Provider: Dr. Greenberg  Evaluating physician: Man Mandel MD  Reason for Consultation: RONNY  Chief Complaint: fever, headache      Subjective   History of present illness:  Linda Good is a  69 y.o.  Yr old female with a pmh of htn and hld who presented to the hospital yesterday evening with complaints of fevers up to 101.9F and intermittent headaches for about 12 days. She was given Macrobid at an urgent treatment center about 10 Days ago for possible UTI and that was changed to cefdinir about 7 days ago.  Symptoms have not improved and she continues to have fevers, night sweats, myalgias, headaches, fatigue, and loss of appetite.  [place] but has not noticed any tick bites or mosquito bites. She does state that she found ticks on herself back in the spring but not recently.  She does garden a lot.  She also bird watches and is in close proximity to birds and feeds them. She has a pet cat but denies any scratches or bites. No recent travel.  No known sick contacts.  She is  and lives with her  and son.    Since arrival, has remained afebrile with a T-max of 98.9F.  She has been slightly tachycardic in the low 100s.  SPO2 is in the mid 90s on room air. White blood cell count was initially 13.99 but has trended down to 10.0. She did have 56% bands today. Platelet count has been elevated to 609-->  501.  Hemoglobin is 10.8 today. Creatinine was initially elevated to 2.0 but is trended down to 1.22.  Baseline is around 0.7-0.8. Lumbar puncture was done with 29 WBCs, 20,000 RBCs.  She had 66% neutrophils and 23% lymphocytes.  CSF cultures no growth to date.  CSF glucose was 64 and CSF protein was 95.3.  Meningitis/encephalitis panel was negative.  Urinalysis showed trace leukocytes esterase, Negative  nitrite, 21-50 WBCs, 3-5 RBCs, and 2+ bacteria.  Urine culture is in progress.  Blood cultures are in progress.  Respiratory PCR panel was negative.  Pro-calcitonin was initially elevated 1.89.  Lactic acid was 1.8.  Monospot test was negative. KAYLA panel has been sent and is pending. Chest x-ray was negative for acute cardio pulmonary mallet.  CT head was negative for intracranial abnormality. The patient was initially on ampicillin but this has been stopped.  She is on ceftriaxone 2 g IV every 12 hours and vancomycin.  ID has been consulted for antibiotic recommendations.    Past Medical History:   Diagnosis Date    Diabetes mellitus 3/1/1993    Gestational    Hyperlipidemia     Hypertension     Pregnancy     1993,1982,1980,1978       Past Surgical History:   Procedure Laterality Date    BREAST SURGERY  1997    Lumpectomy/Benign    COLONOSCOPY      INTERVENTIONAL RADIOLOGY PROCEDURE N/A 5/20/2024    Procedure: IR fluoroscopy guided lumbar puncture diagnostic;  Surgeon: Epi Blackburn MD;  Location: [facility] INVASIVE LOCATION;  Service: Interventional Radiology;  Laterality: N/A;    TUBAL ABDOMINAL LIGATION  1993       Pediatric History   Patient Parents    Not on file     Other Topics Concern    Not on file   Social History Narrative    Not on file   [place] but has not noticed any tick bites or mosquito bites. She does state that she found ticks on herself back in the spring but not recently.  She does garden a lot.  She also bird watches and is in close proximity to birds and feeds them. She has a pet cat but denies any scratches or bites. No recent travel.  No known sick contacts.  She is  and lives with her  and son.    family history includes Cancer in her father; Coronary artery disease in her sister; Diabetes in her mother; Heart disease in her mother; Lung cancer in her father; Other in her mother.    No Known Allergies    Immunization History    Administered Date(s) Administered    COVID-19 (MODERNA) 1st,2nd,3rd Dose Monovalent 03/03/2021, 03/31/2021    COVID-19 (MODERNA) BIVALENT 12+YRS 10/14/2022    COVID-19 (MODERNA) Monovalent Original Booster 11/01/2021, 06/05/2022    COVID-19 F23 (MODERNA) 12YRS+ (SPIKEVAX) 11/30/2023    Fluzone High-Dose 65+yrs 10/14/2022, 11/30/2023    Hepatitis B Adult/Adolescent IM 01/28/2004, 02/25/2004, 05/21/2004    Influenza, Unspecified 10/17/2019    Pneumococcal Conjugate 20-Valent (PCV20) 07/28/2023    Tdap 04/11/2021       Medication:    Current Facility-Administered Medications:     acetaminophen (TYLENOL) tablet 650 mg, 650 mg, Oral, Q4H PRN, Jass Greenberg MD, 650 mg at 05/21/24 0002    ampicillin 2000 mg IVPB in 100 mL NS (MBP), 2 g, Intravenous, Once, Given, Epi DYER MD    sennosides-docusate (PERICOLACE) 8.6-50 MG per tablet 2 tablet, 2 tablet, Oral, BID PRN **AND** polyethylene glycol (MIRALAX) packet 17 g, 17 g, Oral, Daily PRN **AND** bisacodyl (DULCOLAX) EC tablet 5 mg, 5 mg, Oral, Daily PRN **AND** bisacodyl (DULCOLAX) suppository 10 mg, 10 mg, Rectal, Daily PRN, Jass Greenberg MD    cefTRIAXone (ROCEPHIN) 2,000 mg in sodium chloride 0.9 % 100 mL MBP, 2,000 mg, Intravenous, Q12H, Jass Greenberg MD, Last Rate: 200 mL/hr at 05/21/24 0833, 2,000 mg at 05/21/24 0833    heparin (porcine) 5000 UNIT/ML injection 5,000 Units, 5,000 Units, Subcutaneous, Q8H, Jass Greenberg MD    ondansetron ODT (ZOFRAN-ODT) disintegrating tablet 4 mg, 4 mg, Oral, Q6H PRN **OR** ondansetron (ZOFRAN) injection 4 mg, 4 mg, Intravenous, Q6H PRN, Jass Greenberg MD    oxyCODONE-acetaminophen (PERCOCET) 7.5-325 MG per tablet 1 tablet, 1 tablet, Oral, Once PRN, Sruthi Rodríguez, DO    Pharmacy to dose vancomycin, , Does not apply, Continuous PRN, Jass Greenberg MD    rosuvastatin (CRESTOR) tablet 5 mg, 5 mg, Oral, Nightly, Jass Greenberg MD, 5 mg at 05/20/24 2350    sodium chloride 0.9 % flush 10 mL, 10 mL, Intravenous, Q12H,  Jass Greenberg MD, 10 mL at 05/21/24 0835    sodium chloride 0.9 % flush 10 mL, 10 mL, Intravenous, PRN, Jass Greenberg MD    sodium chloride 0.9 % infusion 40 mL, 40 mL, Intravenous, PRN, Jass Greenberg MD    sodium chloride 0.9 % infusion, 75 mL/hr, Intravenous, Continuous, Jass Greenberg MD, Last Rate: 75 mL/hr at 05/20/24 2350, 75 mL/hr at 05/20/24 2350    temazepam (RESTORIL) capsule 15 mg, 15 mg, Oral, Nightly PRN, Jass Greenberg MD, 15 mg at 05/21/24 0057    thiamine (VITAMIN B-1) tablet 50 mg, 50 mg, Oral, Daily, Jass Greenberg MD, 50 mg at 05/21/24 0833    vancomycin (dosing per levels), , Does not apply, Daily **AND** [COMPLETED] Vancomycin, Random, , , Timed, Dennys Parker, PharmD    Please refer to the medical record for a full medication list    Review of Systems:    Constitutional-- +fevers and chills.  +fatigue. +poor appetite.   HEENT-- No new vision loss, hearing loss, or throat pain.  No epistaxis or oral sores.  +headache that has occasionally been over her right temple.  Her headache is mostly diffuse.  She denies any neck pain or stiffness.  She does have some photophobia.  CV-- No chest pain, palpitation or syncope  Resp-- No SOB/cough/Hemoptysis  GI- No nausea, vomiting, or diarrhea.  No hematochezia, melena, or hematemesis. Denies jaundice or chronic liver disease.  -- Slight urinary urgency about 2 weeks ago but this is now resolved and she denies any dysuria, urinary urgency, increased urinary frequency, flank pain, or suprapubic tenderness.  Lymph- no swollen lymph nodes in neck/axilla or groin.   Heme- No active bruising or bleeding; no Hx of DVT or PE.  MS-- +myalgias. She does specifically have some pain in her bilateral shoulders over the last 24 hours.  No pain in her hips.  No other specific joint pains or swelling.  No new back pain.  Neuro-- The patient states that she has generalized muscle weakness but does not have any focal weakness in her arms or legs.  No  "seizure activity. No other new neurologic changes reported.  Skin--She does have some swelling and redness that has developed over the dorsal aspect of her bilateral wrists and distal forearm over the last 24 hrs.No rashes or lesions    Physical Exam:   Vital Signs   Temp:  [97.9 °F (36.6 °C)-98.9 °F (37.2 °C)] 98.2 °F (36.8 °C)  Heart Rate:  [] 90  Resp:  [14-18] 18  BP: (100-135)/(55-77) 132/77    Temp  Min: 97.9 °F (36.6 °C)  Max: 98.9 °F (37.2 °C)  BP  Min: 100/70  Max: 135/65  Pulse  Min: 88  Max: 108  Resp  Min: 14  Max: 18  SpO2  Min: 94 %  Max: 100 %    Blood pressure 132/77, pulse 90, temperature 98.2 °F (36.8 °C), temperature source Oral, resp. rate 18, height 160 cm (63\"), weight 53.5 kg (118 lb), SpO2 97%.  GENERAL: Awake and alert, in no acute distress. Appears stated age.  Frail  HEENT:  Normocephalic, atraumatic.  Oropharynx without thrush. Dentition in good repair. No cervical adenopathy. No neck masses.  Ears externally normal, Nose externally normal.  EYES: PERRL. No conjunctival injection. No icterus.   HEART: RRR, no murmur. No peripheral edema  LUNGS: Clear to auscultation and percussion. No respiratory distress, no use of accessory muscles.  ABDOMEN: Soft, nontender, nondistended. No appreciable HSM.  Bowel sounds normal.  GENITAL: no Rodriguez catheter  MSK: No joint effusions noted.  She does have some erythema and swelling with tenderness to palpation just proximal to her bilateral wrists over the dorsal aspect of her forearm.  No fluctuance or drainage.  She has full range of motion of her bilateral wrists.   SKIN: Bilateral forearms/wrist as noted above.  Has some slight erythema over her chest and a V-shaped formation which is chronic.  No new rashes otherwise.  No peripheral stigmata of infective endocarditis noted.  PSYCHIATRIC: cooperative.  Appropriate mood and affect  NEURO: awake alert and oriented ×4.  Normal speech and cognition    Results Review:   I reviewed the patient's new " "clinical results.  I reviewed the patient's new imaging results and agree with the interpretation.  I reviewed the patient's other test results and agree with the interpretation    Results from last 7 days   Lab Units 05/21/24  0528 05/20/24  1350   WBC 10*3/mm3 10.00 13.99*   HEMOGLOBIN g/dL 10.8* 12.2   HEMATOCRIT % 33.3* 37.9   PLATELETS 10*3/mm3 501* 609*     Results from last 7 days   Lab Units 05/21/24  0528   SODIUM mmol/L 140   POTASSIUM mmol/L 4.1   CHLORIDE mmol/L 107   CO2 mmol/L 23.0   BUN mg/dL 24*   CREATININE mg/dL 1.22*   GLUCOSE mg/dL 96   CALCIUM mg/dL 8.3*     Results from last 7 days   Lab Units 05/21/24  0528   ALK PHOS U/L 92   BILIRUBIN mg/dL <0.2   ALT (SGPT) U/L 34*   AST (SGOT) U/L 19             Results from last 7 days   Lab Units 05/21/24  0528   VANCOMYCIN RM mcg/mL 12.10     Results from last 7 days   Lab Units 05/20/24  1350   LACTATE mmol/L 1.8     Estimated Creatinine Clearance: 37.8 mL/min (A) (by C-G formula based on SCr of 1.22 mg/dL (H)).     Procalitonin Results:      Lab 05/20/24  1350   PROCALCITONIN 1.89*      Brief Urine Lab Results  (Last result in the past 365 days)        Color   Clarity   Blood   Leuk Est   Nitrite   Protein   CREAT   Urine HCG        05/20/24 1441 Dark Yellow   Turbid   Negative   Trace   Negative   30 mg/dL (1+)                  No results found for: \"SITE\", \"ALLENTEST\", \"PHART\", \"EPG0XFW\", \"PO2ART\", \"CDD8BZB\", \"BASEEXCESS\", \"Q8RGCQRQ\", \"HGBBG\", \"HCTABG\", \"OXYHEMOGLOBI\", \"METHHGBN\", \"CARBOXYHGB\", \"CO2CT\", \"BAROMETRIC\", \"MODALITY\", \"FIO2\"     Microbiology:  No results found for: \"ACANTHNAEG\", \"AFBCX\", \"BPERTUSSISCX\", \"BLOODCX\"  CSF Culture   Date Value Ref Range Status   05/20/2024 No growth  Preliminary     Urine Culture   Date Value Ref Range Status   05/14/2024 Final report  Final     No results found for: \"VIRALCULTU\", \"WOUNDCX\"     Urine Culture   Date Value Ref Range Status   05/14/2024 Final report  Final   (      Radiology:  Imaging Results " (Last 72 Hours)       Procedure Component Value Units Date/Time    CT Head Without Contrast [[id number]] Collected: 05/20/24 1533     Updated: 05/20/24 1537    Narrative:      CT HEAD WO CONTRAST    Date of Exam: 5/20/2024 3:21 PM EDT    Indication: HA, gen weakness.    Comparison: None available.    Technique: Axial CT images were obtained of the head without contrast administration.  Automated exposure control and iterative construction methods were used.      Findings:  Gray-white differentiation is maintained and there is no evidence of intracranial hemorrhage, mass or mass effect. The ventricles are normal in size and configuration. The orbits are normal. The paranasal sinuses are clear. The calvarium is intact.      Impression:      Impression:  No acute intracranial abnormality.        Electronically Signed: Ruperto Chavez MD    5/20/2024 3:34 PM EDT    Workstation ID: [id number]    XR Chest 1 View [[id number]] Collected: 05/20/24 1405     Updated: 05/20/24 1410    Narrative:      XR CHEST 1 VW    Date of Exam: 5/20/2024 1:45 PM EDT    Indication: Generalized weakness    Comparison: None available.    Findings:  The cardiomediastinal silhouette is within normal limits. Pulmonary vascularity appears normal. There is no focal airspace consolidation, pleural effusion, or pneumothorax. There are calcified lymph nodes within the left hilar region likely related to   chronic granulomatous disease. There are degenerative changes of the thoracic spine.      Impression:      Impression:  1. No acute cardiopulmonary abnormality.      Electronically Signed: Burke Ray    5/20/2024 2:07 PM EDT    Workstation ID: [id number]        I independently read the patient's chest xray- no acute process noted    IMPRESSION:     Problems:  Sepsis with recent fevers at home, leukocytosis with neutrophilia, bandemia, tachycardia- Does not appear to be due to a CNS infection based on CSF studies as she appears to have a traumatic tap  and her white blood cell count in the CSF was not significantly elevated after correction.  Additionally, her meningitis/encephalitis panel was negative and her glucose was normal.  Protein was elevated although this was in the setting of a traumatic tap.  She does not appear to have pneumonia clinically or on imaging.  Atypical bacterial illness such as a tick borne illness is a possibility but She denies any recent tick or mosquito exposures and her labs are not in the distribution I would expect for a tickborne illness.  She does have a significant bandemia and an elevated pro-calcitonin which does increase the concern for bacterial illness.  Blood cultures thus far are no growth to date and urine culture is also no growth to date.  TTE was negative for vegetations.  We will need to continue to monitor her blood cultures on IV antibiotic therapy for now.  Also unclear if she could have an atypical infection such as a fungal infection as she is around birds in close proximity.  Histoplasmosis can cause erythema nodosum for instance. Other possibilities include rheumatologic conditions such as polymyalgia rheumatica in the setting of her bilateral shoulder pain/body aches and can be associated with temporal arteritis.  She does have headaches that have occasionally been present over her right temple.  I checked ESR and CRP today and she does have significant elevation of these labs.  Ferritin was only moderately elevated and is not consistent with AOSD.   Acute kidney injury  Pyuria/possible UTI  Headaches  Pain, swelling, and erythema just proximal to her bilateral wrists over the dorsal aspect of her arms.  It would be unusual to have bilateral cellulitis in this distribution.  More likely an immunologic phenomena.  Could be due to an underlying rheumatologic issue.  Erythema nodosum is usually over the lower extremities but is a possibility.    RECOMMENDATIONS:    -Continue to follow CBC and CMP closely  -Follow  blood cultures, urine culture, and CSF cultures  -send ESR and CRP- both are significantly elevated with CRP 18 and ESR 57  -ferritin checked- Moderately elevated at 469.8.  -Follow pending KAYLA panel. Send ANCA panel  -Check CPK level-was normal at 120.  -check urine histo Ag  -She is not having any abdominal pain but could consider CT abdomen and pelvis if etiology remains unclear and she is not improving. Would likely have to avoid IV contrast in the setting of her renal dysfunction so the study might also be limited.  -May need to consider rheumatology consultation depending on clinical course and studies.  -Continue ceftriaxone but okay to change to 2 g IV every 24 hours dosing  -Continue empiric vancomycin for now.  May stop this soon if no evidence for MRSA on blood culture    I reviewed the patient's labs, micro, radiographs, and medications today    I discussed the patient's complex case with Dr. Mora today    Thank you for asking me to see Linda Good.  Our group would be pleased to follow this patient over the course of their hospitalization and assist with outpatient antimicrobial therapy, as indicated.  Further recommendations depend on the results of the cultures and clinical course.    Complex MDM    Man Mandel MD  5/21/2024

## 2024-05-22 ENCOUNTER — APPOINTMENT (OUTPATIENT)
Dept: GENERAL RADIOLOGY | Facility: HOSPITAL | Age: 69
DRG: 871 | End: 2024-05-22
Payer: MEDICARE

## 2024-05-22 ENCOUNTER — APPOINTMENT (OUTPATIENT)
Dept: CT IMAGING | Facility: HOSPITAL | Age: 69
DRG: 871 | End: 2024-05-22
Payer: MEDICARE

## 2024-05-22 PROBLEM — A41.9 SEPSIS: Status: ACTIVE | Noted: 2024-05-22

## 2024-05-22 LAB
ALBUMIN SERPL-MCNC: 3.3 G/DL (ref 3.5–5.2)
ALBUMIN/GLOB SERPL: 1.6 G/DL
ALP SERPL-CCNC: 86 U/L (ref 39–117)
ALT SERPL W P-5'-P-CCNC: 24 U/L (ref 1–33)
ANION GAP SERPL CALCULATED.3IONS-SCNC: 11 MMOL/L (ref 5–15)
AST SERPL-CCNC: 16 U/L (ref 1–32)
B PARAPERT DNA SPEC QL NAA+PROBE: NOT DETECTED
B PERT DNA SPEC QL NAA+PROBE: NOT DETECTED
BACTERIA SPEC AEROBE CULT: NO GROWTH
BASOPHILS # BLD MANUAL: 0.12 10*3/MM3 (ref 0–0.2)
BASOPHILS NFR BLD MANUAL: 1 % (ref 0–1.5)
BILIRUB SERPL-MCNC: <0.2 MG/DL (ref 0–1.2)
BUN SERPL-MCNC: 9 MG/DL (ref 8–23)
BUN/CREAT SERPL: 15.5 (ref 7–25)
C PNEUM DNA NPH QL NAA+NON-PROBE: NOT DETECTED
CALCIUM SPEC-SCNC: 8.1 MG/DL (ref 8.6–10.5)
CENTROMERE B AB SER-ACNC: <0.2 AI (ref 0–0.9)
CHLORIDE SERPL-SCNC: 105 MMOL/L (ref 98–107)
CHROMATIN AB SERPL-ACNC: <0.2 AI (ref 0–0.9)
CO2 SERPL-SCNC: 22 MMOL/L (ref 22–29)
CREAT SERPL-MCNC: 0.58 MG/DL (ref 0.57–1)
DEPRECATED RDW RBC AUTO: 46.6 FL (ref 37–54)
DSDNA AB SER-ACNC: <1 IU/ML (ref 0–9)
EGFRCR SERPLBLD CKD-EPI 2021: 99.3 ML/MIN/1.73
ENA JO1 AB SER-ACNC: <0.2 AI (ref 0–0.9)
ENA RNP AB SER-ACNC: <0.2 AI (ref 0–0.9)
ENA SCL70 AB SER-ACNC: <0.2 AI (ref 0–0.9)
ENA SM AB SER-ACNC: <0.2 AI (ref 0–0.9)
ENA SS-A AB SER-ACNC: <0.2 AI (ref 0–0.9)
ENA SS-B AB SER-ACNC: <0.2 AI (ref 0–0.9)
EOSINOPHIL # BLD MANUAL: 0.35 10*3/MM3 (ref 0–0.4)
EOSINOPHIL NFR BLD MANUAL: 3 % (ref 0.3–6.2)
ERYTHROCYTE [DISTWIDTH] IN BLOOD BY AUTOMATED COUNT: 12.9 % (ref 12.3–15.4)
FLUAV SUBTYP SPEC NAA+PROBE: NOT DETECTED
FLUBV RNA ISLT QL NAA+PROBE: NOT DETECTED
GLOBULIN UR ELPH-MCNC: 2.1 GM/DL
GLUCOSE SERPL-MCNC: 85 MG/DL (ref 65–99)
HADV DNA SPEC NAA+PROBE: NOT DETECTED
HCOV 229E RNA SPEC QL NAA+PROBE: NOT DETECTED
HCOV HKU1 RNA SPEC QL NAA+PROBE: NOT DETECTED
HCOV NL63 RNA SPEC QL NAA+PROBE: NOT DETECTED
HCOV OC43 RNA SPEC QL NAA+PROBE: NOT DETECTED
HCT VFR BLD AUTO: 32.3 % (ref 34–46.6)
HGB BLD-MCNC: 10.5 G/DL (ref 12–15.9)
HMPV RNA NPH QL NAA+NON-PROBE: NOT DETECTED
HPIV1 RNA ISLT QL NAA+PROBE: NOT DETECTED
HPIV2 RNA SPEC QL NAA+PROBE: NOT DETECTED
HPIV3 RNA NPH QL NAA+PROBE: NOT DETECTED
HPIV4 P GENE NPH QL NAA+PROBE: NOT DETECTED
LYMPHOCYTES # BLD MANUAL: 1.41 10*3/MM3 (ref 0.7–3.1)
LYMPHOCYTES NFR BLD MANUAL: 6 % (ref 5–12)
Lab: NORMAL
M PNEUMO IGG SER IA-ACNC: NOT DETECTED
MCH RBC QN AUTO: 31.8 PG (ref 26.6–33)
MCHC RBC AUTO-ENTMCNC: 32.5 G/DL (ref 31.5–35.7)
MCV RBC AUTO: 97.9 FL (ref 79–97)
MONOCYTES # BLD: 0.71 10*3/MM3 (ref 0.1–0.9)
MRSA DNA SPEC QL NAA+PROBE: NEGATIVE
NEUTROPHILS # BLD AUTO: 9.17 10*3/MM3 (ref 1.7–7)
NEUTROPHILS NFR BLD MANUAL: 40 % (ref 42.7–76)
NEUTS BAND NFR BLD MANUAL: 38 % (ref 0–5)
NRBC SPEC MANUAL: 0 /100 WBC (ref 0–0.2)
PLATELET # BLD AUTO: 525 10*3/MM3 (ref 140–450)
PMV BLD AUTO: 9.5 FL (ref 6–12)
POTASSIUM SERPL-SCNC: 4.1 MMOL/L (ref 3.5–5.2)
PROT SERPL-MCNC: 5.4 G/DL (ref 6–8.5)
RBC # BLD AUTO: 3.3 10*6/MM3 (ref 3.77–5.28)
RBC MORPH BLD: NORMAL
RHINOVIRUS RNA SPEC NAA+PROBE: NOT DETECTED
RSV RNA NPH QL NAA+NON-PROBE: NOT DETECTED
SARS-COV-2 RNA NPH QL NAA+NON-PROBE: NOT DETECTED
SMALL PLATELETS BLD QL SMEAR: ABNORMAL
SODIUM SERPL-SCNC: 138 MMOL/L (ref 136–145)
VARIANT LYMPHS NFR BLD MANUAL: 12 % (ref 19.6–45.3)
WBC MORPH BLD: NORMAL
WBC NRBC COR # BLD AUTO: 11.76 10*3/MM3 (ref 3.4–10.8)

## 2024-05-22 PROCEDURE — 80053 COMPREHEN METABOLIC PANEL: CPT | Performed by: STUDENT IN AN ORGANIZED HEALTH CARE EDUCATION/TRAINING PROGRAM

## 2024-05-22 PROCEDURE — 25010000002 HEPARIN (PORCINE) PER 1000 UNITS: Performed by: INTERNAL MEDICINE

## 2024-05-22 PROCEDURE — 71250 CT THORAX DX C-: CPT

## 2024-05-22 PROCEDURE — 25010000002 VANCOMYCIN 750 MG RECONSTITUTED SOLUTION 1 EACH VIAL

## 2024-05-22 PROCEDURE — 25510000001 IOPAMIDOL 61 % SOLUTION: Performed by: STUDENT IN AN ORGANIZED HEALTH CARE EDUCATION/TRAINING PROGRAM

## 2024-05-22 PROCEDURE — 63710000001 ONDANSETRON ODT 4 MG TABLET DISPERSIBLE: Performed by: INTERNAL MEDICINE

## 2024-05-22 PROCEDURE — 85025 COMPLETE CBC W/AUTO DIFF WBC: CPT | Performed by: STUDENT IN AN ORGANIZED HEALTH CARE EDUCATION/TRAINING PROGRAM

## 2024-05-22 PROCEDURE — 87385 HISTOPLASMA CAPSUL AG IA: CPT | Performed by: INTERNAL MEDICINE

## 2024-05-22 PROCEDURE — 73030 X-RAY EXAM OF SHOULDER: CPT

## 2024-05-22 PROCEDURE — 25810000003 SODIUM CHLORIDE 0.9 % SOLUTION 250 ML FLEX CONT

## 2024-05-22 PROCEDURE — 25010000002 MORPHINE PER 10 MG: Performed by: STUDENT IN AN ORGANIZED HEALTH CARE EDUCATION/TRAINING PROGRAM

## 2024-05-22 PROCEDURE — 87449 NOS EACH ORGANISM AG IA: CPT | Performed by: INTERNAL MEDICINE

## 2024-05-22 PROCEDURE — 25810000003 SODIUM CHLORIDE 0.9 % SOLUTION: Performed by: INTERNAL MEDICINE

## 2024-05-22 PROCEDURE — 25010000002 CEFTRIAXONE PER 250 MG: Performed by: STUDENT IN AN ORGANIZED HEALTH CARE EDUCATION/TRAINING PROGRAM

## 2024-05-22 PROCEDURE — 0202U NFCT DS 22 TRGT SARS-COV-2: CPT | Performed by: STUDENT IN AN ORGANIZED HEALTH CARE EDUCATION/TRAINING PROGRAM

## 2024-05-22 PROCEDURE — 87641 MR-STAPH DNA AMP PROBE: CPT | Performed by: INTERNAL MEDICINE

## 2024-05-22 PROCEDURE — 85007 BL SMEAR W/DIFF WBC COUNT: CPT | Performed by: STUDENT IN AN ORGANIZED HEALTH CARE EDUCATION/TRAINING PROGRAM

## 2024-05-22 PROCEDURE — 99232 SBSQ HOSP IP/OBS MODERATE 35: CPT | Performed by: STUDENT IN AN ORGANIZED HEALTH CARE EDUCATION/TRAINING PROGRAM

## 2024-05-22 PROCEDURE — 25010000002 ONDANSETRON PER 1 MG: Performed by: INTERNAL MEDICINE

## 2024-05-22 PROCEDURE — 25010000002 MORPHINE PER 10 MG: Performed by: NURSE PRACTITIONER

## 2024-05-22 PROCEDURE — 74177 CT ABD & PELVIS W/CONTRAST: CPT

## 2024-05-22 RX ORDER — MORPHINE SULFATE 2 MG/ML
2 INJECTION, SOLUTION INTRAMUSCULAR; INTRAVENOUS EVERY 4 HOURS PRN
Status: DISCONTINUED | OUTPATIENT
Start: 2024-05-22 | End: 2024-05-25 | Stop reason: HOSPADM

## 2024-05-22 RX ORDER — NALOXONE HCL 0.4 MG/ML
0.4 VIAL (ML) INJECTION
Status: DISCONTINUED | OUTPATIENT
Start: 2024-05-22 | End: 2024-05-25 | Stop reason: HOSPADM

## 2024-05-22 RX ORDER — MORPHINE SULFATE 2 MG/ML
1 INJECTION, SOLUTION INTRAMUSCULAR; INTRAVENOUS EVERY 4 HOURS PRN
Status: DISCONTINUED | OUTPATIENT
Start: 2024-05-22 | End: 2024-05-22

## 2024-05-22 RX ORDER — MORPHINE SULFATE 2 MG/ML
2 INJECTION, SOLUTION INTRAMUSCULAR; INTRAVENOUS ONCE
Status: COMPLETED | OUTPATIENT
Start: 2024-05-22 | End: 2024-05-22

## 2024-05-22 RX ORDER — DOXYCYCLINE 100 MG/1
100 CAPSULE ORAL EVERY 12 HOURS SCHEDULED
Status: DISCONTINUED | OUTPATIENT
Start: 2024-05-22 | End: 2024-05-25 | Stop reason: HOSPADM

## 2024-05-22 RX ADMIN — ACETAMINOPHEN 650 MG: 325 TABLET ORAL at 11:44

## 2024-05-22 RX ADMIN — LIDOCAINE 1 PATCH: 4 PATCH TOPICAL at 17:31

## 2024-05-22 RX ADMIN — IOPAMIDOL 75 ML: 612 INJECTION, SOLUTION INTRAVENOUS at 12:20

## 2024-05-22 RX ADMIN — OXYCODONE 5 MG: 5 TABLET ORAL at 11:51

## 2024-05-22 RX ADMIN — TEMAZEPAM 15 MG: 15 CAPSULE ORAL at 23:10

## 2024-05-22 RX ADMIN — OXYCODONE 5 MG: 5 TABLET ORAL at 18:21

## 2024-05-22 RX ADMIN — ONDANSETRON 4 MG: 2 INJECTION INTRAMUSCULAR; INTRAVENOUS at 03:35

## 2024-05-22 RX ADMIN — CEFTRIAXONE 2000 MG: 2 INJECTION, POWDER, FOR SOLUTION INTRAMUSCULAR; INTRAVENOUS at 08:39

## 2024-05-22 RX ADMIN — HEPARIN SODIUM 5000 UNITS: 5000 INJECTION INTRAVENOUS; SUBCUTANEOUS at 14:53

## 2024-05-22 RX ADMIN — THIAMINE HCL TAB 100 MG 50 MG: 100 TAB at 08:39

## 2024-05-22 RX ADMIN — Medication 10 ML: at 21:22

## 2024-05-22 RX ADMIN — ONDANSETRON 4 MG: 4 TABLET, ORALLY DISINTEGRATING ORAL at 22:07

## 2024-05-22 RX ADMIN — HEPARIN SODIUM 5000 UNITS: 5000 INJECTION INTRAVENOUS; SUBCUTANEOUS at 05:44

## 2024-05-22 RX ADMIN — ROSUVASTATIN 5 MG: 10 TABLET, FILM COATED ORAL at 21:22

## 2024-05-22 RX ADMIN — ACETAMINOPHEN 650 MG: 325 TABLET ORAL at 17:31

## 2024-05-22 RX ADMIN — MORPHINE SULFATE 2 MG: 2 INJECTION, SOLUTION INTRAMUSCULAR; INTRAVENOUS at 03:35

## 2024-05-22 RX ADMIN — MORPHINE SULFATE 1 MG: 2 INJECTION, SOLUTION INTRAMUSCULAR; INTRAVENOUS at 09:47

## 2024-05-22 RX ADMIN — SODIUM CHLORIDE 75 ML/HR: 9 INJECTION, SOLUTION INTRAVENOUS at 15:32

## 2024-05-22 RX ADMIN — VANCOMYCIN HYDROCHLORIDE 750 MG: 750 INJECTION, POWDER, LYOPHILIZED, FOR SOLUTION INTRAVENOUS at 00:36

## 2024-05-22 RX ADMIN — OXYCODONE 5 MG: 5 TABLET ORAL at 05:44

## 2024-05-22 RX ADMIN — DOXYCYCLINE 100 MG: 100 CAPSULE ORAL at 11:44

## 2024-05-22 RX ADMIN — Medication 10 ML: at 08:40

## 2024-05-22 RX ADMIN — HEPARIN SODIUM 5000 UNITS: 5000 INJECTION INTRAVENOUS; SUBCUTANEOUS at 21:22

## 2024-05-22 RX ADMIN — DOXYCYCLINE 100 MG: 100 CAPSULE ORAL at 21:22

## 2024-05-22 RX ADMIN — MORPHINE SULFATE 2 MG: 2 INJECTION, SOLUTION INTRAMUSCULAR; INTRAVENOUS at 15:45

## 2024-05-22 NOTE — PLAN OF CARE
Goal Outcome Evaluation:              Outcome Evaluation: Patient continues to complain of having right shoulder pain, PRN pain medication has been given which has been effective, room has been kept darker and quiet to help with pain. Has been up to the chair to eat lunch today and assisted to the restroom x1 staff member. Has alarms on and in place with call light within reach.

## 2024-05-22 NOTE — PLAN OF CARE
Goal Outcome Evaluation:              Outcome Evaluation: Patient continues to have pain in her shoulders and having increase headaches, she has received Prn morphine which was increased and oxycodone along with tylenol. She has had and x-ray of her shoulder, CT of abdomen/pelvis with contrast, along with MRSA, Resp. Panel PCR with COVID and urine was sent to lab. Has an order for resp,. sputum CX still waiting on patient for sample, she has not had any sputum today. Has been assisted up to the restroom with assist x1 staff member. Has alarms on and in place with call light within reach.

## 2024-05-22 NOTE — PROGRESS NOTES
[facility]  PROGRESS NOTE    Patient Name: Linda Good  : 1955  MRN: [id number]    Date of Admission: 2024  Primary Care Physician: Julissa Vernon PA-C    Subjective   Subjective     CC:  Fever    HPI:  Afebrile.  On 2 L nasal cannula.  Reports significant bilateral shoulder pain and she is in tears due to that pain.  Reports headache on the top of her head and in the occipital region.  Specifically denied any temporal headache.  Reports new onset nonproductive cough since yesterday.  No chest pain.  No nausea or vomiting.  No diarrhea.  No urinary symptoms.    Objective   Objective     Vital Signs:   Temp:  [98 °F (36.7 °C)-99.3 °F (37.4 °C)] 98 °F (36.7 °C)  Heart Rate:  [82-97] 88  Resp:  [18] 18  BP: (125-147)/(61-85) 147/85  Flow (L/min):  [2] 2     Physical Exam:  Constitutional: No acute distress, awake, alert, laying in bed and tearful regarding pain  HENT: NCAT, mucous membranes moist  Respiratory: Crackles in the bilateral bases, respiratory effort normal   Cardiovascular: RRR  Gastrointestinal: Positive bowel sounds, soft, nontender, nondistended  Musculoskeletal: No bilateral ankle edema  Psychiatric: Appropriate affect, cooperative  Neurologic: Alert, oriented, strength symmetric in all extremities, Cranial Nerves grossly intact to confrontation, speech clear  Skin: Erythema of chest, erythema bilateral wrist, see photos slightly improved compared to photos below            Results Reviewed:  LAB RESULTS:      Lab 24  0524  1350   WBC 11.76* 10.00 13.99*   HEMOGLOBIN 10.5* 10.8* 12.2   HEMATOCRIT 32.3* 33.3* 37.9   PLATELETS 525* 501* 609*   NEUTROS ABS 9.17* 8.80* 12.03*   EOS ABS 0.35 0.00 0.00   MCV 97.9* 98.5* 99.0*   SED RATE  --  57*  --    CRP  --  18.01*  --    PROCALCITONIN  --   --  1.89*   LACTATE  --   --  1.8         Lab 24  04324  0524  1350   SODIUM 138 140 132*    POTASSIUM 4.1 4.1 4.2   CHLORIDE 105 107 92*   CO2 22.0 23.0 22.0   ANION GAP 11.0 10.0 18.0*   BUN 9 24* 36*   CREATININE 0.58 1.22* 2.00*   EGFR 99.3 48.7* 26.9*   GLUCOSE 85 96 118*   CALCIUM 8.1* 8.3* 9.2   MAGNESIUM  --   --  2.1   TSH  --   --  2.020         Lab 05/22/24  0432 05/21/24  0528 05/20/24  1350   TOTAL PROTEIN 5.4* 5.5* 7.4   ALBUMIN 3.3* 3.4* 3.9   GLOBULIN 2.1 2.1 3.5   ALT (SGPT) 24 34* 50*   AST (SGOT) 16 19 26   BILIRUBIN <0.2 <0.2 0.2   ALK PHOS 86 92 115                 Lab 05/21/24  0528   FERRITIN 469.80*         Brief Urine Lab Results  (Last result in the past 365 days)        Color   Clarity   Blood   Leuk Est   Nitrite   Protein   CREAT   Urine HCG        05/20/24 1441 Dark Yellow   Turbid   Negative   Trace   Negative   30 mg/dL (1+)                   Microbiology Results Abnormal       Procedure Component Value - Date/Time    Urine Culture - Urine, Urine, Clean Catch [[id number]]  (Normal) Collected: 05/20/24 1441    Lab Status: Final result Specimen: Urine, Clean Catch Updated: 05/22/24 0921     Urine Culture No growth    Culture, CSF - Cerebrospinal Fluid, Lumbar Puncture [[id number]] Collected: 05/20/24 1830    Lab Status: Preliminary result Specimen: Cerebrospinal Fluid from Lumbar Puncture Updated: 05/22/24 0859     CSF Culture No growth at 2 days     Gram Stain Moderate (3+) Red blood cells      Occasional WBCs seen      No organisms seen    Blood Culture - Blood, Arm, Right [[id number]]  (Normal) Collected: 05/20/24 1438    Lab Status: Preliminary result Specimen: Blood from Arm, Right Updated: 05/21/24 1515     Blood Culture No growth at 24 hours    Blood Culture - Blood, Arm, Left [[id number]]  (Normal) Collected: 05/20/24 1350    Lab Status: Preliminary result Specimen: Blood from Arm, Left Updated: 05/21/24 1415     Blood Culture No growth at 24 hours    Meningitis / Encephalitis Panel, PCR - Cerebrospinal Fluid, Lumbar Puncture [[id number]]  (Normal) Collected: 05/20/24 4955     Lab Status: Final result Specimen: Cerebrospinal Fluid from Lumbar Puncture Updated: 05/20/24 2115     ESCHERICHIA COLI K1, PCR Not Detected     HAEMOPHILUS INFLUENZAE, PCR Not Detected     LISTERIA MONOCYTOGENES, PCR Not Detected     NEISSERIA MENINGITIDIS, PCR Not Detected     STREPTOCOCCUS AGALACTIAE, PCR Not Detected     STREPTOCOCCUS PNEUMONIAE, PCR Not Detected     CYTOMEGALOVIRUS (CMV), PCR Not Detected     ENTEROVIRUS, PCR Not Detected     HERPES SIMPLEX VIRUS 1 (HSV-1), PCR Not Detected     HERPES SIMPLEX VIRUS 2 (HSV-2), PCR Not Detected     HUMAN PARECHOVIRUS, PCR Not Detected     VARICELLA ZOSTER VIRUS (VZV), PCR Not Detected     CRYPTOCOCCUS NEOFORMANS / GATTII, PCR Not Detected     HUMAN HERPES VIRUS 6 PCR Not Detected    Respiratory Panel PCR w/COVID-19(SARS-CoV-2) [facility]ENI/[facility]/LEONA In-House, NP Swab in UTM/VTM, 2 HR TAT - Swab, Nasopharynx [[id number]]  (Normal) Collected: 05/20/24 1434    Lab Status: Final result Specimen: Swab from Nasopharynx Updated: 05/20/24 1546     ADENOVIRUS, PCR Not Detected     Coronavirus 229E Not Detected     Coronavirus HKU1 Not Detected     Coronavirus NL63 Not Detected     Coronavirus OC43 Not Detected     COVID19 Not Detected     Human Metapneumovirus Not Detected     Human Rhinovirus/Enterovirus Not Detected     Influenza A PCR Not Detected     Influenza B PCR Not Detected     Parainfluenza Virus 1 Not Detected     Parainfluenza Virus 2 Not Detected     Parainfluenza Virus 3 Not Detected     Parainfluenza Virus 4 Not Detected     RSV, PCR Not Detected     Bordetella pertussis pcr Not Detected     Bordetella parapertussis PCR Not Detected     Chlamydophila pneumoniae PCR Not Detected     Mycoplasma pneumo by PCR Not Detected    Narrative:      In the setting of a positive respiratory panel with a viral infection PLUS a negative procalcitonin without other underlying concern for bacterial infection, consider observing off antibiotics or discontinuation of  antibiotics and continue supportive care. If the respiratory panel is positive for atypical bacterial infection (Bordetella pertussis, Chlamydophila pneumoniae, or Mycoplasma pneumoniae), consider antibiotic de-escalation to target atypical bacterial infection.            CT Chest Without Contrast Diagnostic    Result Date: 5/22/2024  CT CHEST WO CONTRAST DIAGNOSTIC, CT ABDOMEN PELVIS W CONTRAST Date of Exam: 5/22/2024 12:11 PM EDT Indication: cough. Abdominal pain Comparison: None available. Technique: Axial CT images were obtained of the chest without contrast administration. Axial CT images were obtained of the abdomen and pelvis after intravenous administration of 75 cc Isovue-300. Reconstructed coronal and sagittal images were also obtained. Automated exposure control and iterative construction methods were used. Findings: CT CHEST: MEDIASTINUM: Unremarkable. Aortic and heart size are normal. No mass nor pericardial effusion. CORONARY ARTERIES: No calcified atherosclerotic disease. LUNGS: There is nodular and more confluent airspace disease within the lower lungs bilaterally, left more so than right. There is a degree of interlobular septal thickening/interstitial edema. Imaging features are most suggestive of multifocal pneumonia.  No single suspicious nodule is identified. PLEURAL SPACE: There are small bilateral pleural effusions. CT ABDOMEN AND PELVIS:  LIVER:  Unremarkable parenchyma without focal lesion. BILIARY/GALLBLADDER:  Unremarkable SPLEEN:  Unremarkable PANCREAS:  Unremarkable ADRENAL:  Unremarkable KIDNEYS:  Unremarkable parenchyma with no solid mass identified. No obstruction.  No calculus identified. GASTROINTESTINAL/MESENTERY:  No evidence of obstruction nor inflammation. The appendix is normal. AORTA/IVC:  Normal caliber. RETROPERITONEUM/LYMPH NODES:  Unremarkable REPRODUCTIVE: There is a right-sided uterine fibroid. There is minimal free fluid in the pelvis, a frequent normal finding in  females. No definitive etiology identified. BLADDER:  Unremarkable OSSEUS STRUCTURES:  Typical for age with no acute process identified.     Impression: Impression: 1.Multifocal pneumonia with small bilateral parapneumonic effusions. 2.Minimal nonspecific free fluid in the pelvis, a frequent normal finding in females as no etiology identified. 3.Other incidental nonemergent findings as detailed above. Electronically Signed: Reid Banerjee MD  5/22/2024 12:48 PM EDT  Workstation ID: [id number]    CT Abdomen Pelvis With Contrast    Result Date: 5/22/2024  CT CHEST WO CONTRAST DIAGNOSTIC, CT ABDOMEN PELVIS W CONTRAST Date of Exam: 5/22/2024 12:11 PM EDT Indication: cough. Abdominal pain Comparison: None available. Technique: Axial CT images were obtained of the chest without contrast administration. Axial CT images were obtained of the abdomen and pelvis after intravenous administration of 75 cc Isovue-300. Reconstructed coronal and sagittal images were also obtained. Automated exposure control and iterative construction methods were used. Findings: CT CHEST: MEDIASTINUM: Unremarkable. Aortic and heart size are normal. No mass nor pericardial effusion. CORONARY ARTERIES: No calcified atherosclerotic disease. LUNGS: There is nodular and more confluent airspace disease within the lower lungs bilaterally, left more so than right. There is a degree of interlobular septal thickening/interstitial edema. Imaging features are most suggestive of multifocal pneumonia.  No single suspicious nodule is identified. PLEURAL SPACE: There are small bilateral pleural effusions. CT ABDOMEN AND PELVIS:  LIVER:  Unremarkable parenchyma without focal lesion. BILIARY/GALLBLADDER:  Unremarkable SPLEEN:  Unremarkable PANCREAS:  Unremarkable ADRENAL:  Unremarkable KIDNEYS:  Unremarkable parenchyma with no solid mass identified. No obstruction.  No calculus identified. GASTROINTESTINAL/MESENTERY:  No evidence of obstruction nor inflammation. The  appendix is normal. AORTA/IVC:  Normal caliber. RETROPERITONEUM/LYMPH NODES:  Unremarkable REPRODUCTIVE: There is a right-sided uterine fibroid. There is minimal free fluid in the pelvis, a frequent normal finding in females. No definitive etiology identified. BLADDER:  Unremarkable OSSEUS STRUCTURES:  Typical for age with no acute process identified.     Impression: Impression: 1.Multifocal pneumonia with small bilateral parapneumonic effusions. 2.Minimal nonspecific free fluid in the pelvis, a frequent normal finding in females as no etiology identified. 3.Other incidental nonemergent findings as detailed above. Electronically Signed: Reid Banerjee MD  5/22/2024 12:48 PM EDT  Workstation ID: [id number]    XR Shoulder 2+ View Bilateral    Result Date: 5/22/2024  XR SHOULDER 2+ VW BILATERAL Date of Exam: 5/22/2024 9:15 AM EDT Indication: shoulder pain Comparison: None available. Findings: No acute fracture is identified. No focal osseous abnormality is identified. Mild degenerative changes are present along both shoulders. The soft tissues are unremarkable.     Impression: Impression: 1.No acute osseous process identified. 2.Mild degenerative changes as described above. Electronically Signed: Man Puri MD  5/22/2024 9:43 AM EDT  Workstation ID: [id number]    Adult Transthoracic Echo Complete W/ Cont if Necessary Per Protocol    Result Date: 5/21/2024    Left ventricular systolic function is normal. Left ventricular ejection fraction appears to be 61 - 65%.   Mild mitral valve regurgitation is present.   Mild tricuspid valve regurgitation is present.   Estimated right ventricular systolic pressure from tricuspid regurgitation is normal (<35 mmHg).     Invasive peripheral vascular study    Result Date: 5/20/2024  Clinical indication: 69-year-old female with history of UTI, fever/headache, concern for meningitis.  Failed prior lumbar puncture. :  Dr. Chowdary Given Procedures:  Fluoroscopic guidance lumbar  puncture diagnosis Access: 18-gauge-gauge spinal needle in the thecal sac at the mid lumbar region. Contrast: none Estimated Blood Loss:  Negligible Complication:  None Apparent. Technique:  Formal written consent for the procedure was obtained from the patient's/patient's family after explaining risks to include bleeding, infection, spinal nerve/cord injury, and headache.  The lumbar region was prepped and draped in the usual, sterile fashion.  Local anesthesia with 1% lidocaine infiltration was achieved.  Utilizing fluoroscopic guidance, a 18-gauge spinal needle was placed into the thecal sac of the mid lumbar region without difficulty.  Blood-tinged CSF was encountered, with an opening pressure less than 15 cm of water in the prone position.  Approximately 10 milliliters of blood-tinged CSF was drawn off into 4 tubes without difficulty.  CSF was sent for microbiology and/or cytologic/pathologic analysis, per the referring service.  The patient tolerated the procedure well and without apparent complication.     Impression:   Technically successful fluoroscopic guided lumbar puncture for diagnosis.  10 cc of blood-tinged CSF was sent for culture/labs, per the referring service.     CT Head Without Contrast    Result Date: 5/20/2024  CT HEAD WO CONTRAST Date of Exam: 5/20/2024 3:21 PM EDT Indication: HA, gen weakness. Comparison: None available. Technique: Axial CT images were obtained of the head without contrast administration.  Automated exposure control and iterative construction methods were used. Findings: Gray-white differentiation is maintained and there is no evidence of intracranial hemorrhage, mass or mass effect. The ventricles are normal in size and configuration. The orbits are normal. The paranasal sinuses are clear. The calvarium is intact.     Impression: Impression: No acute intracranial abnormality. Electronically Signed: Ruperto Chavez MD  5/20/2024 3:34 PM EDT  Workstation ID: [id number]      Results for orders placed during the hospital encounter of 05/20/24    Adult Transthoracic Echo Complete W/ Cont if Necessary Per Protocol    Interpretation Summary    Left ventricular systolic function is normal. Left ventricular ejection fraction appears to be 61 - 65%.    Mild mitral valve regurgitation is present.    Mild tricuspid valve regurgitation is present.    Estimated right ventricular systolic pressure from tricuspid regurgitation is normal (<35 mmHg).      Current medications:  Scheduled Meds:cefTRIAXone, 2,000 mg, Intravenous, Q24H  doxycycline, 100 mg, Oral, Q12H  heparin (porcine), 5,000 Units, Subcutaneous, Q8H  Lidocaine, 1 patch, Transdermal, Q24H  rosuvastatin, 5 mg, Oral, Nightly  sodium chloride, 10 mL, Intravenous, Q12H  vitamin B-1, 50 mg, Oral, Daily      Continuous Infusions:sodium chloride, 75 mL/hr, Last Rate: 75 mL/hr (05/21/24 1809)      PRN Meds:.  acetaminophen    senna-docusate sodium **AND** polyethylene glycol **AND** bisacodyl **AND** bisacodyl    Morphine    naloxone    ondansetron ODT **OR** ondansetron    oxyCODONE    sodium chloride    sodium chloride    temazepam    Assessment & Plan   Assessment & Plan     Active Hospital Problems    Diagnosis  POA    **Fever [R50.9]  Yes    BLADE (acute kidney injury) [N17.9]  Yes    Bandemia [D72.825]  Yes    Primary hypertension [I10]  Yes      Resolved Hospital Problems   No resolved problems to display.        Brief Hospital Course to date:  Linda Good is a 69 y.o. female  with h/o HTN and HLD who reported fever up to 101.9 and off/on generalized headache, myalgias, night sweats, fatigue, and loss of appetite x about 12 days prior to presentation.     This patient's problems and plans were partially entered by my partner and updated as appropriate by me 05/22/24.     Sepsis with Bandemia, tachycardia  Multifocal pneumonia  Headache  --Infectious versus rheumatologic issue; does have chronic upper chest erythematous rash; on exam  also has new erythema on bilateral wrists and right second MCP joint  --s/p LP with negative meningitis panel; appeared to have traumatic LP; not consistent with CNS infection  --No evidence of joint infection, TTE with no evidence of endocarditis  --s/p recent macrobid and then cefdinir for presumed UTI  --s/p vanc, rocephin, and ampicillin in ER.   --negative for COVID and flu.    --Respiratory panel negative on first testing; ID recommended repeat full respiratory panel (ordered)  --follow blood cultures  --no hardware, [place]  --consulted ID, discussed with Dr. Man Mandel on 5/22  --Ferritin mildly elevated 469, CK1 20, ESR elevated 57, CRP elevated 18  --Follow-up urine histo antigen  --follow-up ANCA  -- CT imaging obtained on 5/22 consistent with multifocal pneumonia; CT abdomen/pelvis with contrast unrevealing  -- Continue ceftriaxone, doxycycline added on  -- Follow-up urine Legionella antigen, strep pneumo urine antigen, MRSA screen, sputum culture (currently non-productive, but will order just in case)    Pain, swelling, erythema of dorsal hands  --Not consistent with cellulitis; may represent reactive arthritis  --As needed pain control    Bilateral shoulder pain  --X-rays unrevealing     BLADE, resolved  --Baseline creatinine around 0.75; creatinine 2 on admission, normalized with IV fluids  --hold HCTZ and lisinopril  --IVF     HTN  HLD  --hold home HCTZ and lisinopril d/t above  --continue statin     Expected Discharge Location and Transportation: home  Expected Discharge   Expected Discharge Date: 5/24/2024; Expected Discharge Time:      DVT prophylaxis:  Medical DVT prophylaxis orders are present.         AM-PAC 6 Clicks Score (PT): 24 (05/22/24 0800)    CODE STATUS:   Code Status and Medical Interventions:   Ordered at: 05/20/24 1516     Level Of Support Discussed With:    Patient     Code Status (Patient has no pulse and is not breathing):    CPR  (Attempt to Resuscitate)     Medical Interventions (Patient has pulse or is breathing):    Full Support       Rosey Mora MD  05/22/24

## 2024-05-23 ENCOUNTER — APPOINTMENT (OUTPATIENT)
Dept: MRI IMAGING | Facility: HOSPITAL | Age: 69
DRG: 871 | End: 2024-05-23
Payer: MEDICARE

## 2024-05-23 LAB
ANION GAP SERPL CALCULATED.3IONS-SCNC: 12 MMOL/L (ref 5–15)
ANION GAP SERPL CALCULATED.3IONS-SCNC: 8 MMOL/L (ref 5–15)
BACTERIA SPEC AEROBE CULT: NORMAL
BASOPHILS # BLD AUTO: 0.06 10*3/MM3 (ref 0–0.2)
BASOPHILS NFR BLD AUTO: 0.5 % (ref 0–1.5)
BUN SERPL-MCNC: 7 MG/DL (ref 8–23)
BUN SERPL-MCNC: 8 MG/DL (ref 8–23)
BUN/CREAT SERPL: 12.5 (ref 7–25)
BUN/CREAT SERPL: 16.7 (ref 7–25)
C-ANCA TITR SER IF: NORMAL TITER
CALCIUM SPEC-SCNC: 7.8 MG/DL (ref 8.6–10.5)
CALCIUM SPEC-SCNC: 8.4 MG/DL (ref 8.6–10.5)
CHLORIDE SERPL-SCNC: 101 MMOL/L (ref 98–107)
CHLORIDE SERPL-SCNC: 105 MMOL/L (ref 98–107)
CHROMATIN AB SERPL-ACNC: 12.9 IU/ML (ref 0–14)
CO2 SERPL-SCNC: 24 MMOL/L (ref 22–29)
CO2 SERPL-SCNC: 26 MMOL/L (ref 22–29)
CREAT SERPL-MCNC: 0.48 MG/DL (ref 0.57–1)
CREAT SERPL-MCNC: 0.56 MG/DL (ref 0.57–1)
DEPRECATED RDW RBC AUTO: 44.8 FL (ref 37–54)
EGFRCR SERPLBLD CKD-EPI 2021: 100.2 ML/MIN/1.73
EGFRCR SERPLBLD CKD-EPI 2021: 104 ML/MIN/1.73
EOSINOPHIL # BLD AUTO: 0.14 10*3/MM3 (ref 0–0.4)
EOSINOPHIL NFR BLD AUTO: 1.2 % (ref 0.3–6.2)
ERYTHROCYTE [DISTWIDTH] IN BLOOD BY AUTOMATED COUNT: 12.5 % (ref 12.3–15.4)
GLUCOSE BLDC GLUCOMTR-MCNC: 80 MG/DL (ref 70–130)
GLUCOSE SERPL-MCNC: 118 MG/DL (ref 65–99)
GLUCOSE SERPL-MCNC: 79 MG/DL (ref 65–99)
GRAM STN SPEC: NORMAL
HCT VFR BLD AUTO: 33.1 % (ref 34–46.6)
HGB BLD-MCNC: 10.8 G/DL (ref 12–15.9)
IMM GRANULOCYTES # BLD AUTO: 0.17 10*3/MM3 (ref 0–0.05)
IMM GRANULOCYTES NFR BLD AUTO: 1.4 % (ref 0–0.5)
L PNEUMO1 AG UR QL IA: NEGATIVE
LYMPHOCYTES # BLD AUTO: 1.8 10*3/MM3 (ref 0.7–3.1)
LYMPHOCYTES NFR BLD AUTO: 14.8 % (ref 19.6–45.3)
MCH RBC QN AUTO: 31.6 PG (ref 26.6–33)
MCHC RBC AUTO-ENTMCNC: 32.6 G/DL (ref 31.5–35.7)
MCV RBC AUTO: 96.8 FL (ref 79–97)
MONOCYTES # BLD AUTO: 0.79 10*3/MM3 (ref 0.1–0.9)
MONOCYTES NFR BLD AUTO: 6.5 % (ref 5–12)
MYELOPEROXIDASE AB SER IA-ACNC: <0.2 UNITS (ref 0–0.9)
NEUTROPHILS NFR BLD AUTO: 75.6 % (ref 42.7–76)
NEUTROPHILS NFR BLD AUTO: 9.19 10*3/MM3 (ref 1.7–7)
NRBC BLD AUTO-RTO: 0 /100 WBC (ref 0–0.2)
P-ANCA ATYPICAL TITR SER IF: NORMAL TITER
P-ANCA TITR SER IF: NORMAL TITER
PLATELET # BLD AUTO: 526 10*3/MM3 (ref 140–450)
PMV BLD AUTO: 9.8 FL (ref 6–12)
POTASSIUM SERPL-SCNC: 3.5 MMOL/L (ref 3.5–5.2)
POTASSIUM SERPL-SCNC: 3.6 MMOL/L (ref 3.5–5.2)
PROTEINASE3 AB SER IA-ACNC: <0.2 UNITS (ref 0–0.9)
RBC # BLD AUTO: 3.42 10*6/MM3 (ref 3.77–5.28)
S PNEUM AG SPEC QL LA: NEGATIVE
SODIUM SERPL-SCNC: 137 MMOL/L (ref 136–145)
SODIUM SERPL-SCNC: 139 MMOL/L (ref 136–145)
WBC NRBC COR # BLD AUTO: 12.15 10*3/MM3 (ref 3.4–10.8)

## 2024-05-23 PROCEDURE — 25010000002 MORPHINE PER 10 MG: Performed by: STUDENT IN AN ORGANIZED HEALTH CARE EDUCATION/TRAINING PROGRAM

## 2024-05-23 PROCEDURE — 93005 ELECTROCARDIOGRAM TRACING: CPT | Performed by: FAMILY MEDICINE

## 2024-05-23 PROCEDURE — 86431 RHEUMATOID FACTOR QUANT: CPT | Performed by: INTERNAL MEDICINE

## 2024-05-23 PROCEDURE — 80048 BASIC METABOLIC PNL TOTAL CA: CPT | Performed by: FAMILY MEDICINE

## 2024-05-23 PROCEDURE — 99233 SBSQ HOSP IP/OBS HIGH 50: CPT | Performed by: FAMILY MEDICINE

## 2024-05-23 PROCEDURE — 82164 ANGIOTENSIN I ENZYME TEST: CPT | Performed by: INTERNAL MEDICINE

## 2024-05-23 PROCEDURE — 82948 REAGENT STRIP/BLOOD GLUCOSE: CPT

## 2024-05-23 PROCEDURE — 25810000003 SODIUM CHLORIDE 0.9 % SOLUTION: Performed by: INTERNAL MEDICINE

## 2024-05-23 PROCEDURE — 70553 MRI BRAIN STEM W/O & W/DYE: CPT

## 2024-05-23 PROCEDURE — A9577 INJ MULTIHANCE: HCPCS | Performed by: FAMILY MEDICINE

## 2024-05-23 PROCEDURE — 85025 COMPLETE CBC W/AUTO DIFF WBC: CPT | Performed by: STUDENT IN AN ORGANIZED HEALTH CARE EDUCATION/TRAINING PROGRAM

## 2024-05-23 PROCEDURE — 25010000002 HEPARIN (PORCINE) PER 1000 UNITS: Performed by: INTERNAL MEDICINE

## 2024-05-23 PROCEDURE — 25010000002 CEFTRIAXONE PER 250 MG: Performed by: STUDENT IN AN ORGANIZED HEALTH CARE EDUCATION/TRAINING PROGRAM

## 2024-05-23 PROCEDURE — 93010 ELECTROCARDIOGRAM REPORT: CPT | Performed by: INTERNAL MEDICINE

## 2024-05-23 PROCEDURE — 80048 BASIC METABOLIC PNL TOTAL CA: CPT | Performed by: STUDENT IN AN ORGANIZED HEALTH CARE EDUCATION/TRAINING PROGRAM

## 2024-05-23 PROCEDURE — 0 GADOBENATE DIMEGLUMINE 529 MG/ML SOLUTION: Performed by: FAMILY MEDICINE

## 2024-05-23 RX ORDER — HYDROXYZINE HYDROCHLORIDE 10 MG/1
10 TABLET, FILM COATED ORAL 3 TIMES DAILY PRN
Status: DISCONTINUED | OUTPATIENT
Start: 2024-05-23 | End: 2024-05-25 | Stop reason: HOSPADM

## 2024-05-23 RX ORDER — ACETAMINOPHEN 325 MG/1
650 TABLET ORAL EVERY 4 HOURS PRN
Status: DISCONTINUED | OUTPATIENT
Start: 2024-05-23 | End: 2024-05-25 | Stop reason: HOSPADM

## 2024-05-23 RX ORDER — PANTOPRAZOLE SODIUM 40 MG/1
40 TABLET, DELAYED RELEASE ORAL
Status: DISCONTINUED | OUTPATIENT
Start: 2024-05-24 | End: 2024-05-25 | Stop reason: HOSPADM

## 2024-05-23 RX ORDER — AMLODIPINE BESYLATE 5 MG/1
5 TABLET ORAL
Status: DISCONTINUED | OUTPATIENT
Start: 2024-05-24 | End: 2024-05-25 | Stop reason: HOSPADM

## 2024-05-23 RX ORDER — LISINOPRIL 20 MG/1
40 TABLET ORAL
Status: DISCONTINUED | OUTPATIENT
Start: 2024-05-23 | End: 2024-05-23

## 2024-05-23 RX ORDER — MELOXICAM 7.5 MG/1
7.5 TABLET ORAL DAILY
Status: DISCONTINUED | OUTPATIENT
Start: 2024-05-23 | End: 2024-05-25 | Stop reason: HOSPADM

## 2024-05-23 RX ADMIN — Medication 10 ML: at 08:56

## 2024-05-23 RX ADMIN — MELOXICAM 7.5 MG: 7.5 TABLET ORAL at 14:20

## 2024-05-23 RX ADMIN — TEMAZEPAM 15 MG: 15 CAPSULE ORAL at 22:02

## 2024-05-23 RX ADMIN — CEFTRIAXONE 2000 MG: 2 INJECTION, POWDER, FOR SOLUTION INTRAMUSCULAR; INTRAVENOUS at 09:23

## 2024-05-23 RX ADMIN — POLYETHYLENE GLYCOL 3350 17 G: 17 POWDER, FOR SOLUTION ORAL at 12:51

## 2024-05-23 RX ADMIN — HEPARIN SODIUM 5000 UNITS: 5000 INJECTION INTRAVENOUS; SUBCUTANEOUS at 14:20

## 2024-05-23 RX ADMIN — ACETAMINOPHEN 650 MG: 325 TABLET ORAL at 05:11

## 2024-05-23 RX ADMIN — GADOBENATE DIMEGLUMINE 10 ML: 529 INJECTION, SOLUTION INTRAVENOUS at 20:39

## 2024-05-23 RX ADMIN — ROSUVASTATIN 5 MG: 10 TABLET, FILM COATED ORAL at 21:56

## 2024-05-23 RX ADMIN — DOXYCYCLINE 100 MG: 100 CAPSULE ORAL at 21:56

## 2024-05-23 RX ADMIN — HEPARIN SODIUM 5000 UNITS: 5000 INJECTION INTRAVENOUS; SUBCUTANEOUS at 21:56

## 2024-05-23 RX ADMIN — MORPHINE SULFATE 2 MG: 2 INJECTION, SOLUTION INTRAMUSCULAR; INTRAVENOUS at 17:57

## 2024-05-23 RX ADMIN — OXYCODONE 5 MG: 5 TABLET ORAL at 02:02

## 2024-05-23 RX ADMIN — THIAMINE HCL TAB 100 MG 50 MG: 100 TAB at 09:19

## 2024-05-23 RX ADMIN — HYDROXYZINE HYDROCHLORIDE 10 MG: 10 TABLET ORAL at 09:19

## 2024-05-23 RX ADMIN — LISINOPRIL 40 MG: 20 TABLET ORAL at 09:19

## 2024-05-23 RX ADMIN — ACETAMINOPHEN 650 MG: 325 TABLET ORAL at 12:51

## 2024-05-23 RX ADMIN — HEPARIN SODIUM 5000 UNITS: 5000 INJECTION INTRAVENOUS; SUBCUTANEOUS at 05:11

## 2024-05-23 RX ADMIN — DOXYCYCLINE 100 MG: 100 CAPSULE ORAL at 09:19

## 2024-05-23 RX ADMIN — SODIUM CHLORIDE 75 ML/HR: 9 INJECTION, SOLUTION INTRAVENOUS at 14:20

## 2024-05-23 RX ADMIN — OXYCODONE 5 MG: 5 TABLET ORAL at 16:11

## 2024-05-23 RX ADMIN — MORPHINE SULFATE 2 MG: 2 INJECTION, SOLUTION INTRAMUSCULAR; INTRAVENOUS at 03:32

## 2024-05-23 RX ADMIN — OXYCODONE 5 MG: 5 TABLET ORAL at 09:19

## 2024-05-23 RX ADMIN — HYDROXYZINE HYDROCHLORIDE 10 MG: 10 TABLET ORAL at 22:02

## 2024-05-23 RX ADMIN — Medication 10 ML: at 22:23

## 2024-05-23 NOTE — NURSING NOTE
"Pt called out stating she felt short of air. Found pt on 2LNC sitting on edge of bed satting 92%. Asked pt about needing a breathing treatment and she states \"no I don't use those.\" Pt then adamant about taking a shower. Told pt I did not feel comfortable with her taking a shower if she was short of air. Pt became tearful and states she is just anxious and her shortness of air is probably related to that and still wants to shower. Set up extended o2 tubing to reach to bathroom and she states she doesn't need that, states \"I don't typically where oxygen.\" Pt educated again on utilizing oxygen if feeling short of air but still refused. Charge RN, Arnold present in room with me during conversation. Pt ambulated to bathroom with no complaints or signs of distress. Asked if it was ok if someone stayed in the room with her and she says \"ill pull the cord if I need you.\"   "

## 2024-05-23 NOTE — PROGRESS NOTES
INFECTIOUS DISEASE Progress note    Linda Good  1955  [id number]    Date of consult: 5/21/24    Admit date: 5/20/2024    Requesting Provider: Dr. Greenberg  Evaluating physician: Man Mandel MD  Reason for Consultation: RONNY  Chief Complaint: fever, headache      Subjective   History of present illness:  Linda Good is a  69 y.o.  Yr old female with a pmh of htn and hld who presented to the hospital yesterday evening with complaints of fevers up to 101.9F and intermittent headaches for about 12 days. She was given Macrobid at an urgent treatment center about 10 Days ago for possible UTI and that was changed to cefdinir about 7 days ago.  Symptoms have not improved and she continues to have fevers, night sweats, myalgias, headaches, fatigue, and loss of appetite.  [place] but has not noticed any tick bites or mosquito bites. She does state that she found ticks on herself back in the spring but not recently.  She does garden a lot.  She also bird watches and is in close proximity to birds and feeds them. She has a pet cat but denies any scratches or bites. No recent travel.  No known sick contacts.  She is  and lives with her  and son.    Since arrival, has remained afebrile with a T-max of 98.9F.  She has been slightly tachycardic in the low 100s.  SPO2 is in the mid 90s on room air. White blood cell count was initially 13.99 but has trended down to 10.0. She did have 56% bands today. Platelet count has been elevated to 609-->  501.  Hemoglobin is 10.8 today. Creatinine was initially elevated to 2.0 but is trended down to 1.22.  Baseline is around 0.7-0.8. Lumbar puncture was done with 29 WBCs, 20,000 RBCs.  She had 66% neutrophils and 23% lymphocytes.  CSF cultures no growth to date.  CSF glucose was 64 and CSF protein was 95.3.  Meningitis/encephalitis panel was negative.  Urinalysis showed trace leukocytes esterase, Negative nitrite, 21-50 WBCs,  3-5 RBCs, and 2+ bacteria.  Urine culture is in progress.  Blood cultures are in progress.  Respiratory PCR panel was negative.  Pro-calcitonin was initially elevated 1.89.  Lactic acid was 1.8.  Monospot test was negative. KAYLA panel has been sent and is pending. Chest x-ray was negative for acute cardio pulmonary mallet.  CT head was negative for intracranial abnormality. The patient was initially on ampicillin but this has been stopped.  She is on ceftriaxone 2 g IV every 12 hours and vancomycin.  ID has been consulted for antibiotic recommendations.    Subjective:    5/22/24: The patient had some worsening shortness of breath of the last 24 hours and did have some mild hypoxia and is now on 2 L nasal cannula supplemental O2.  She is afebrile still.  White blood cell count is slightly higher today but bandemia was slightly better. CT chest with signs of multifocal pneumonia with small bilateral parapneumonic effusions.  CT abdomen and pelvis was done and was without any acute changes.  Blood cultures remain no growth to date and urine culture was no growth. She is still having some severe diffuse headaches.  Also having some bilateral shoulder pain that is ongoing.    5/23/24: The patient's shoulder pain is somewhat better today.  She does have some new erythematous nodular regions over multiple fingers bilaterally.  These are tender.  Ongoing shortness of breath and hypoxia but stable from yesterday and she feels like her shortness of breath may be somewhat better.  Still having ongoing headaches.  MRI brain has been ordered and is pending.  She is not having any fevers.  White blood cell count is slightly worse today at 12.15.    Past Medical History:   Diagnosis Date    Diabetes mellitus 3/1/1993    Gestational    Hyperlipidemia     Hypertension     Pregnancy     1993,1982,1980,1978       Past Surgical History:   Procedure Laterality Date    BREAST SURGERY  1997    Lumpectomy/Benign    COLONOSCOPY       INTERVENTIONAL RADIOLOGY PROCEDURE N/A 5/20/2024    Procedure: IR fluoroscopy guided lumbar puncture diagnostic;  Surgeon: Epi Blackburn MD;  Location: [facility] INVASIVE LOCATION;  Service: Interventional Radiology;  Laterality: N/A;    TUBAL ABDOMINAL LIGATION  1993       Pediatric History   Patient Parents    Not on file     Other Topics Concern    Not on file   Social History Narrative    Not on file   [place] but has not noticed any tick bites or mosquito bites. She does state that she found ticks on herself back in the spring but not recently.  She does garden a lot.  She also bird watches and is in close proximity to birds and feeds them. She has a pet cat but denies any scratches or bites. No recent travel.  No known sick contacts.  She is  and lives with her  and son.    family history includes Cancer in her father; Coronary artery disease in her sister; Diabetes in her mother; Heart disease in her mother; Lung cancer in her father; Other in her mother.    No Known Allergies    Immunization History   Administered Date(s) Administered    COVID-19 (MODERNA) 1st,2nd,3rd Dose Monovalent 03/03/2021, 03/31/2021    COVID-19 (MODERNA) BIVALENT 12+YRS 10/14/2022    COVID-19 (MODERNA) Monovalent Original Booster 11/01/2021, 06/05/2022    COVID-19 F23 (MODERNA) 12YRS+ (SPIKEVAX) 11/30/2023    Fluzone High-Dose 65+yrs 10/14/2022, 11/30/2023    Hepatitis B Adult/Adolescent IM 01/28/2004, 02/25/2004, 05/21/2004    Influenza, Unspecified 10/17/2019    Pneumococcal Conjugate 20-Valent (PCV20) 07/28/2023    Tdap 04/11/2021       Medication:    Current Facility-Administered Medications:     acetaminophen (TYLENOL) tablet 650 mg, 650 mg, Oral, Q4H PRN, Sweta Gracia MD    [START ON 5/24/2024] amLODIPine (NORVASC) tablet 5 mg, 5 mg, Oral, Q24H, Sweta Gracia MD    sennosides-docusate (PERICOLACE) 8.6-50 MG per tablet 2 tablet, 2 tablet, Oral, BID PRN **AND**  polyethylene glycol (MIRALAX) packet 17 g, 17 g, Oral, Daily PRN, 17 g at 05/23/24 1251 **AND** bisacodyl (DULCOLAX) EC tablet 5 mg, 5 mg, Oral, Daily PRN **AND** bisacodyl (DULCOLAX) suppository 10 mg, 10 mg, Rectal, Daily PRN, Jass Greenberg MD    cefTRIAXone (ROCEPHIN) 2,000 mg in sodium chloride 0.9 % 100 mL MBP, 2,000 mg, Intravenous, Q24H, Rosey Mora MD, Last Rate: 200 mL/hr at 05/23/24 0923, 2,000 mg at 05/23/24 0923    doxycycline (MONODOX) capsule 100 mg, 100 mg, Oral, Q12H, Rosey Mora MD, 100 mg at 05/23/24 0919    heparin (porcine) 5000 UNIT/ML injection 5,000 Units, 5,000 Units, Subcutaneous, Q8H, Jass Greenberg MD, 5,000 Units at 05/23/24 1420    hydrOXYzine (ATARAX) tablet 10 mg, 10 mg, Oral, TID PRN, Sweta Gracia MD, 10 mg at 05/23/24 0919    Lidocaine 4 % 1 patch, 1 patch, Transdermal, Q24H, Melissa Payne, [facility], 1 patch at 05/22/24 1731    meloxicam (MOBIC) tablet 7.5 mg, 7.5 mg, Oral, Daily, Sweta Gracia MD, 7.5 mg at 05/23/24 1420    morphine injection 2 mg, 2 mg, Intravenous, Q4H PRN, Rosey Mora MD, 2 mg at 05/23/24 1757    naloxone (NARCAN) injection 0.4 mg, 0.4 mg, Intravenous, Q5 Min PRN, Rosey Mora MD    ondansetron ODT (ZOFRAN-ODT) disintegrating tablet 4 mg, 4 mg, Oral, Q6H PRN, 4 mg at 05/22/24 2207 **OR** ondansetron (ZOFRAN) injection 4 mg, 4 mg, Intravenous, Q6H PRN, Jass Greenberg MD, 4 mg at 05/22/24 0335    oxyCODONE (ROXICODONE) immediate release tablet 5 mg, 5 mg, Oral, Q6H PRN, Rosey Mora MD, 5 mg at 05/23/24 1611    [START ON 5/24/2024] pantoprazole (PROTONIX) EC tablet 40 mg, 40 mg, Oral, Q AM, Sweta Gracia MD    rosuvastatin (CRESTOR) tablet 5 mg, 5 mg, Oral, Nightly, Jass Greenberg MD, 5 mg at 05/22/24 2122    sodium chloride 0.9 % flush 10 mL, 10 mL, Intravenous, Q12H, Jass Greenberg MD, 10 mL at 05/23/24 0856    sodium chloride 0.9 % flush 10 mL, 10 mL, Intravenous, PRN, Jass Greenberg MD    sodium chloride 0.9 % infusion 40 mL, 40 mL,  "Intravenous, PRN, Jass Greenberg MD    sodium chloride 0.9 % infusion, 50 mL/hr, Intravenous, Continuous, Sweta Gracia MD, Last Rate: 75 mL/hr at 05/23/24 1420, 75 mL/hr at 05/23/24 1420    temazepam (RESTORIL) capsule 15 mg, 15 mg, Oral, Nightly PRN, Jass Greenberg MD, 15 mg at 05/22/24 2310    thiamine (VITAMIN B-1) tablet 50 mg, 50 mg, Oral, Daily, Jass Greenberg MD, 50 mg at 05/23/24 0919    Please refer to the medical record for a full medication list    Review of Systems:    As above    Physical Exam:   Vital Signs   Temp:  [97.7 °F (36.5 °C)-98.3 °F (36.8 °C)] 98 °F (36.7 °C)  Heart Rate:  [78-99] 93  Resp:  [17-18] 17  BP: (140-161)/(80-93) 161/92    Temp  Min: 97.7 °F (36.5 °C)  Max: 98.3 °F (36.8 °C)  BP  Min: 140/80  Max: 161/92  Pulse  Min: 78  Max: 99  Resp  Min: 17  Max: 18  SpO2  Min: 91 %  Max: 97 %    Blood pressure 161/92, pulse 93, temperature 98 °F (36.7 °C), temperature source Oral, resp. rate 17, height 160 cm (62.99\"), weight 53 kg (116 lb 13.5 oz), SpO2 94%.  GENERAL: Awake and alert, in no acute distress. Resting comfortable in bed  HEENT:  Normocephalic, atraumatic.  No external oral lesions noted.  No thrush noted.  EYES: Pupils equal and round. No conjunctival injection. No icterus.   HEART: RRR, no murmur. No peripheral edema  LUNGS: Has a few rales noted bilaterally over her back.  No wheezing.  Nonlabored breathing on 2 L nasal cannula  ABDOMEN: Soft, nontender, nondistended. No appreciable HSM.    GENITAL: no Rodriguez catheter  MSK: No joint effusions noted.  She does have some erythematous, tender, nodules over multiple fingers bilaterally.  No fluctuance over these regions. Still having some shoulder pain but mobility is increasing somewhat.  SKIN: No new generalized rashes noted  PSYCHIATRIC: cooperative.  Appropriate mood and affect  NEURO: awake alert and oriented ×4.  Normal speech and cognition    Results Review:   I reviewed the patient's new clinical results.  I " "reviewed the patient's new imaging results and agree with the interpretation.  I reviewed the patient's other test results and agree with the interpretation    Results from last 7 days   Lab Units 05/23/24  0500 05/22/24  0432 05/21/24  0528   WBC 10*3/mm3 12.15* 11.76* 10.00   HEMOGLOBIN g/dL 10.8* 10.5* 10.8*   HEMATOCRIT % 33.1* 32.3* 33.3*   PLATELETS 10*3/mm3 526* 525* 501*     Results from last 7 days   Lab Units 05/23/24  1400   SODIUM mmol/L 139   POTASSIUM mmol/L 3.5   CHLORIDE mmol/L 105   CO2 mmol/L 26.0   BUN mg/dL 7*   CREATININE mg/dL 0.56*   GLUCOSE mg/dL 118*   CALCIUM mg/dL 8.4*     Results from last 7 days   Lab Units 05/22/24  0432   ALK PHOS U/L 86   BILIRUBIN mg/dL <0.2   ALT (SGPT) U/L 24   AST (SGOT) U/L 16     Results from last 7 days   Lab Units 05/21/24  0528   SED RATE mm/hr 57*     Results from last 7 days   Lab Units 05/21/24  0528   CRP mg/dL 18.01*     Results from last 7 days   Lab Units 05/21/24  0528   VANCOMYCIN RM mcg/mL 12.10     Results from last 7 days   Lab Units 05/20/24  1350   LACTATE mmol/L 1.8     Estimated Creatinine Clearance: 81.6 mL/min (A) (by C-G formula based on SCr of 0.56 mg/dL (L)).  CPK          5/21/2024    05:28   Common Labs   Creatine Kinase 120       Procalitonin Results:      Lab 05/20/24  1350   PROCALCITONIN 1.89*      Brief Urine Lab Results  (Last result in the past 365 days)        Color   Clarity   Blood   Leuk Est   Nitrite   Protein   CREAT   Urine HCG        05/20/24 1441 Dark Yellow   Turbid   Negative   Trace   Negative   30 mg/dL (1+)                  No results found for: \"SITE\", \"ALLENTEST\", \"PHART\", \"DXI2IRR\", \"PO2ART\", \"FBG3YLF\", \"BASEEXCESS\", \"E8NFJJKP\", \"HGBBG\", \"HCTABG\", \"OXYHEMOGLOBI\", \"METHHGBN\", \"CARBOXYHGB\", \"CO2CT\", \"BAROMETRIC\", \"MODALITY\", \"FIO2\"     Microbiology:  No results found for: \"ACANTHNAEG\", \"AFBCX\", \"BPERTUSSISCX\", \"BLOODCX\"  CSF Culture   Date Value Ref Range Status   05/20/2024 No growth  Preliminary     Urine Culture " "  Date Value Ref Range Status   05/14/2024 Final report  Final     No results found for: \"VIRALCULTU\", \"WOUNDCX\"     Urine Culture   Date Value Ref Range Status   05/14/2024 Final report  Final   (      Radiology:  Imaging Results (Last 72 Hours)       Procedure Component Value Units Date/Time    CT Chest Without Contrast Diagnostic [[id number]] Collected: 05/22/24 1242     Updated: 05/22/24 1251    Narrative:      CT CHEST WO CONTRAST DIAGNOSTIC, CT ABDOMEN PELVIS W CONTRAST    Date of Exam: 5/22/2024 12:11 PM EDT    Indication: cough. Abdominal pain    Comparison: None available.    Technique: Axial CT images were obtained of the chest without contrast administration. Axial CT images were obtained of the abdomen and pelvis after intravenous administration of 75 cc Isovue-300. Reconstructed coronal and sagittal images were also   obtained. Automated exposure control and iterative construction methods were used.      Findings:    CT CHEST:  MEDIASTINUM: Unremarkable. Aortic and heart size are normal. No mass nor pericardial effusion.  CORONARY ARTERIES: No calcified atherosclerotic disease.  LUNGS: There is nodular and more confluent airspace disease within the lower lungs bilaterally, left more so than right. There is a degree of interlobular septal thickening/interstitial edema. Imaging features are most suggestive of multifocal pneumonia.   No single suspicious nodule is identified.  PLEURAL SPACE: There are small bilateral pleural effusions.        CT ABDOMEN AND PELVIS:   LIVER:  Unremarkable parenchyma without focal lesion.  BILIARY/GALLBLADDER:  Unremarkable  SPLEEN:  Unremarkable  PANCREAS:  Unremarkable  ADRENAL:  Unremarkable  KIDNEYS:  Unremarkable parenchyma with no solid mass identified. No obstruction.  No calculus identified.  GASTROINTESTINAL/MESENTERY:  No evidence of obstruction nor inflammation. The appendix is normal.  AORTA/IVC:  Normal caliber.    RETROPERITONEUM/LYMPH NODES:  " Unremarkable    REPRODUCTIVE: There is a right-sided uterine fibroid. There is minimal free fluid in the pelvis, a frequent normal finding in females. No definitive etiology identified.  BLADDER:  Unremarkable    OSSEUS STRUCTURES:  Typical for age with no acute process identified.          Impression:      Impression:  1.Multifocal pneumonia with small bilateral parapneumonic effusions.  2.Minimal nonspecific free fluid in the pelvis, a frequent normal finding in females as no etiology identified.  3.Other incidental nonemergent findings as detailed above.        Electronically Signed: Reid Banerjee MD    5/22/2024 12:48 PM EDT    Workstation ID: [id number]    CT Abdomen Pelvis With Contrast [[id number]] Collected: 05/22/24 1242     Updated: 05/22/24 1251    Narrative:      CT CHEST WO CONTRAST DIAGNOSTIC, CT ABDOMEN PELVIS W CONTRAST    Date of Exam: 5/22/2024 12:11 PM EDT    Indication: cough. Abdominal pain    Comparison: None available.    Technique: Axial CT images were obtained of the chest without contrast administration. Axial CT images were obtained of the abdomen and pelvis after intravenous administration of 75 cc Isovue-300. Reconstructed coronal and sagittal images were also   obtained. Automated exposure control and iterative construction methods were used.      Findings:    CT CHEST:  MEDIASTINUM: Unremarkable. Aortic and heart size are normal. No mass nor pericardial effusion.  CORONARY ARTERIES: No calcified atherosclerotic disease.  LUNGS: There is nodular and more confluent airspace disease within the lower lungs bilaterally, left more so than right. There is a degree of interlobular septal thickening/interstitial edema. Imaging features are most suggestive of multifocal pneumonia.   No single suspicious nodule is identified.  PLEURAL SPACE: There are small bilateral pleural effusions.        CT ABDOMEN AND PELVIS:   LIVER:  Unremarkable parenchyma without focal lesion.  BILIARY/GALLBLADDER:   Unremarkable  SPLEEN:  Unremarkable  PANCREAS:  Unremarkable  ADRENAL:  Unremarkable  KIDNEYS:  Unremarkable parenchyma with no solid mass identified. No obstruction.  No calculus identified.  GASTROINTESTINAL/MESENTERY:  No evidence of obstruction nor inflammation. The appendix is normal.  AORTA/IVC:  Normal caliber.    RETROPERITONEUM/LYMPH NODES:  Unremarkable    REPRODUCTIVE: There is a right-sided uterine fibroid. There is minimal free fluid in the pelvis, a frequent normal finding in females. No definitive etiology identified.  BLADDER:  Unremarkable    OSSEUS STRUCTURES:  Typical for age with no acute process identified.          Impression:      Impression:  1.Multifocal pneumonia with small bilateral parapneumonic effusions.  2.Minimal nonspecific free fluid in the pelvis, a frequent normal finding in females as no etiology identified.  3.Other incidental nonemergent findings as detailed above.        Electronically Signed: Reid Banerjee MD    5/22/2024 12:48 PM EDT    Workstation ID: [id number]    XR Shoulder 2+ View Bilateral [[id number]] Collected: 05/22/24 0942     Updated: 05/22/24 0946    Narrative:      XR SHOULDER 2+ VW BILATERAL    Date of Exam: 5/22/2024 9:15 AM EDT    Indication: shoulder pain    Comparison: None available.    Findings:  No acute fracture is identified. No focal osseous abnormality is identified. Mild degenerative changes are present along both shoulders. The soft tissues are unremarkable.      Impression:      Impression:  1.No acute osseous process identified.  2.Mild degenerative changes as described above.      Electronically Signed: Man Puri MD    5/22/2024 9:43 AM EDT    Workstation ID: [id number]            IMPRESSION:     Problems:  Sepsis with recent fevers at home, leukocytosis with neutrophilia, bandemia, tachycardia- Etiology remains somewhat unclear although she now has pneumonia noted on CT chest which could be the culprit for her symptoms.  Unclear why her  respiratory symptoms started after her headaches and fevers.  Additionally she has ongoing bilateral shoulder pain.  It is possible that she has reactive arthritis due to an underlying infection but unclear to me why she did not initially have pulmonary symptoms. I additionally wonder if sarcoidosis/Melia syndrome is a possibility. 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 There certainly will be suspicion for this if she has CNS lesion on MRI brain.  Pneumonia-Ongoing  Mild hypoxia- Seems to be stable on 2 L nasal cannula supplemental O2  Acute kidney injury-Improved  Pyuria-Urine culture was no growth  Headaches-Ongoing.  MRI brain ordered  Pain, swelling, and erythema just proximal to her bilateral wrists over the dorsal aspect of her arms.  Her initial risks swelling and erythema has improved but now she has some erythematous, tender nodules over multiple fingers on both of her hands.    RECOMMENDATIONS:    -Continue to follow CBC and CMP closely  -Follow blood cultures, urine culture, and CSF cultures- NGTD  -KAYLA panel was negative. ANCA panel pending  -Send rheumatoid factor  -Sent ACE level  -Follow urine histo Ag-Pending  -Urine strep pneumo and urine Legionella antigens-Both negative  -Continue ceftriaxone but okay to change to 2 g IV every 24 hours dosing  -Continue doxycycline 100 mg by mouth twice a day.  -Plan for MRI brain today given ongoing headaches  -Started Mobic for now in case of reactive arthritis.    I reviewed the patient's labs, micro, and medications today    I discussed in length with the patient and one of her brothers at bedside today    I discussed the patient's complex case with Dr. Gracia today    Thank you for asking me to see Linda Good.  Our group would be pleased to follow this patient over the course of their hospitalization and assist with  outpatient antimicrobial therapy, as indicated.  Further recommendations depend on the results of the cultures and clinical course.    Complex MDM    Man Mandel MD  5/23/2024

## 2024-05-23 NOTE — CASE MANAGEMENT/SOCIAL WORK
Continued Stay Note  [facility]     Patient Name: Linda Good  MRN: [id number]  Today's Date: 5/23/2024    Admit Date: 5/20/2024    Plan: Home   Discharge Plan       Row Name 05/23/24 1548       Plan    Plan Home    Plan Comments I will reach out to [facility] for orders at discharge.   If home oxygen indicated, will arrange through [facility]. I will update patient tomorrow.    Final Discharge Disposition Code 06 - home with home health care                   Discharge Codes    No documentation.                 Expected Discharge Date and Time       Expected Discharge Date Expected Discharge Time    May 24, 2024               Johanna Loyola RN

## 2024-05-23 NOTE — PROGRESS NOTES
INFECTIOUS DISEASE Progress note    Linda Good  1955  [id number]    Date of consult: 5/21/24    Admit date: 5/20/2024    Requesting Provider: Dr. Greenberg  Evaluating physician: Man Mandel MD  Reason for Consultation: RONNY  Chief Complaint: fever, headache      Subjective   History of present illness:  Linda Good is a  69 y.o.  Yr old female with a pmh of htn and hld who presented to the hospital yesterday evening with complaints of fevers up to 101.9F and intermittent headaches for about 12 days. She was given Macrobid at an urgent treatment center about 10 Days ago for possible UTI and that was changed to cefdinir about 7 days ago.  Symptoms have not improved and she continues to have fevers, night sweats, myalgias, headaches, fatigue, and loss of appetite.  [place] but has not noticed any tick bites or mosquito bites. She does state that she found ticks on herself back in the spring but not recently.  She does garden a lot.  She also bird watches and is in close proximity to birds and feeds them. She has a pet cat but denies any scratches or bites. No recent travel.  No known sick contacts.  She is  and lives with her  and son.    Since arrival, has remained afebrile with a T-max of 98.9F.  She has been slightly tachycardic in the low 100s.  SPO2 is in the mid 90s on room air. White blood cell count was initially 13.99 but has trended down to 10.0. She did have 56% bands today. Platelet count has been elevated to 609-->  501.  Hemoglobin is 10.8 today. Creatinine was initially elevated to 2.0 but is trended down to 1.22.  Baseline is around 0.7-0.8. Lumbar puncture was done with 29 WBCs, 20,000 RBCs.  She had 66% neutrophils and 23% lymphocytes.  CSF cultures no growth to date.  CSF glucose was 64 and CSF protein was 95.3.  Meningitis/encephalitis panel was negative.  Urinalysis showed trace leukocytes esterase, Negative nitrite, 21-50 WBCs,  3-5 RBCs, and 2+ bacteria.  Urine culture is in progress.  Blood cultures are in progress.  Respiratory PCR panel was negative.  Pro-calcitonin was initially elevated 1.89.  Lactic acid was 1.8.  Monospot test was negative. KAYLA panel has been sent and is pending. Chest x-ray was negative for acute cardio pulmonary mallet.  CT head was negative for intracranial abnormality. The patient was initially on ampicillin but this has been stopped.  She is on ceftriaxone 2 g IV every 12 hours and vancomycin.  ID has been consulted for antibiotic recommendations.    Subjective:    5/22/24: The patient had some worsening shortness of breath of the last 24 hours and did have some mild hypoxia and is now on 2 L nasal cannula supplemental O2.  She is afebrile still.  White blood cell count is slightly higher today but bandemia was slightly better. CT chest with signs of multifocal pneumonia with small bilateral parapneumonic effusions.  CT abdomen and pelvis was done and was without any acute changes.  Blood cultures remain no growth to date and urine culture was no growth. She is still having some severe diffuse headaches.  Also having some bilateral shoulder pain that is ongoing.    Past Medical History:   Diagnosis Date    Diabetes mellitus 3/1/1993    Gestational    Hyperlipidemia     Hypertension     Pregnancy     1993,1982,1980,1978       Past Surgical History:   Procedure Laterality Date    BREAST SURGERY  1997    Lumpectomy/Benign    COLONOSCOPY      INTERVENTIONAL RADIOLOGY PROCEDURE N/A 5/20/2024    Procedure: IR fluoroscopy guided lumbar puncture diagnostic;  Surgeon: Epi Blackburn MD;  Location: [facility] INVASIVE LOCATION;  Service: Interventional Radiology;  Laterality: N/A;    TUBAL ABDOMINAL LIGATION  1993       Pediatric History   Patient Parents    Not on file     Other Topics Concern    Not on file   Social History Narrative    Not on file   [place] but has not noticed  any tick bites or mosquito bites. She does state that she found ticks on herself back in the spring but not recently.  She does garden a lot.  She also bird watches and is in close proximity to birds and feeds them. She has a pet cat but denies any scratches or bites. No recent travel.  No known sick contacts.  She is  and lives with her  and son.    family history includes Cancer in her father; Coronary artery disease in her sister; Diabetes in her mother; Heart disease in her mother; Lung cancer in her father; Other in her mother.    No Known Allergies    Immunization History   Administered Date(s) Administered    COVID-19 (MODERNA) 1st,2nd,3rd Dose Monovalent 03/03/2021, 03/31/2021    COVID-19 (MODERNA) BIVALENT 12+YRS 10/14/2022    COVID-19 (MODERNA) Monovalent Original Booster 11/01/2021, 06/05/2022    COVID-19 F23 (MODERNA) 12YRS+ (SPIKEVAX) 11/30/2023    Fluzone High-Dose 65+yrs 10/14/2022, 11/30/2023    Hepatitis B Adult/Adolescent IM 01/28/2004, 02/25/2004, 05/21/2004    Influenza, Unspecified 10/17/2019    Pneumococcal Conjugate 20-Valent (PCV20) 07/28/2023    Tdap 04/11/2021       Medication:    Current Facility-Administered Medications:     acetaminophen (TYLENOL) tablet 650 mg, 650 mg, Oral, Q4H PRN, Rosey Mora MD, 650 mg at 05/22/24 1731    sennosides-docusate (PERICOLACE) 8.6-50 MG per tablet 2 tablet, 2 tablet, Oral, BID PRN **AND** polyethylene glycol (MIRALAX) packet 17 g, 17 g, Oral, Daily PRN **AND** bisacodyl (DULCOLAX) EC tablet 5 mg, 5 mg, Oral, Daily PRN **AND** bisacodyl (DULCOLAX) suppository 10 mg, 10 mg, Rectal, Daily PRN, Jass Greenberg MD    cefTRIAXone (ROCEPHIN) 2,000 mg in sodium chloride 0.9 % 100 mL MBP, 2,000 mg, Intravenous, Q24H, Rosey Mora MD, Stopped at 05/22/24 0915    doxycycline (MONODOX) capsule 100 mg, 100 mg, Oral, Q12H, Rosey Mora MD, 100 mg at 05/22/24 2122    heparin (porcine) 5000 UNIT/ML injection 5,000 Units, 5,000 Units, Subcutaneous, Q8H,  Jass Greenberg MD, 5,000 Units at 05/22/24 2122    Lidocaine 4 % 1 patch, 1 patch, Transdermal, Q24H, Melissa Payne, [facility], 1 patch at 05/22/24 1731    morphine injection 2 mg, 2 mg, Intravenous, Q4H PRN, Rosey Mora MD, 2 mg at 05/22/24 1545    naloxone (NARCAN) injection 0.4 mg, 0.4 mg, Intravenous, Q5 Min PRN, Rosey Mora MD    ondansetron ODT (ZOFRAN-ODT) disintegrating tablet 4 mg, 4 mg, Oral, Q6H PRN, 4 mg at 05/22/24 2207 **OR** ondansetron (ZOFRAN) injection 4 mg, 4 mg, Intravenous, Q6H PRN, Jass Greenberg MD, 4 mg at 05/22/24 0335    oxyCODONE (ROXICODONE) immediate release tablet 5 mg, 5 mg, Oral, Q6H PRN, Rosey Mora MD, 5 mg at 05/22/24 1821    rosuvastatin (CRESTOR) tablet 5 mg, 5 mg, Oral, Nightly, Jass Greenberg MD, 5 mg at 05/22/24 2122    sodium chloride 0.9 % flush 10 mL, 10 mL, Intravenous, Q12H, Jass Greenberg MD, 10 mL at 05/22/24 2122    sodium chloride 0.9 % flush 10 mL, 10 mL, Intravenous, PRN, Jass Greenberg MD    sodium chloride 0.9 % infusion 40 mL, 40 mL, Intravenous, PRN, Jass Greenberg MD    sodium chloride 0.9 % infusion, 75 mL/hr, Intravenous, Continuous, Jass Greenberg MD, Last Rate: 75 mL/hr at 05/22/24 1532, 75 mL/hr at 05/22/24 1532    temazepam (RESTORIL) capsule 15 mg, 15 mg, Oral, Nightly PRN, Jass Greenberg MD, 15 mg at 05/21/24 2051    thiamine (VITAMIN B-1) tablet 50 mg, 50 mg, Oral, Daily, Jass Greenberg MD, 50 mg at 05/22/24 0839    Please refer to the medical record for a full medication list    Review of Systems:    As above    Physical Exam:   Vital Signs   Temp:  [98 °F (36.7 °C)-98.3 °F (36.8 °C)] 98.3 °F (36.8 °C)  Heart Rate:  [82-97] 86  Resp:  [18] 18  BP: (129-153)/(70-85) 153/81    Temp  Min: 98 °F (36.7 °C)  Max: 98.3 °F (36.8 °C)  BP  Min: 129/74  Max: 153/81  Pulse  Min: 82  Max: 97  Resp  Min: 18  Max: 18  SpO2  Min: 90 %  Max: 96 %    Blood pressure 153/81, pulse 86, temperature 98.3 °F (36.8 °C), temperature source Oral, resp. rate  "18, height 160 cm (62.99\"), weight 53 kg (116 lb 13.5 oz), SpO2 94%.  GENERAL: Awake and alert, in no acute distress. Appears stated age.  Frail  HEENT:  Normocephalic, atraumatic.  No external oral lesions noted.  No thrush noted.  EYES: Pupils equal and round. No conjunctival injection. No icterus.   HEART: RRR, no murmur. No peripheral edema  LUNGS: Has a few rales noted bilaterally over her back.  No wheezing.  Nonlabored breathing on 2 L nasal cannula  ABDOMEN: Soft, nontender, nondistended. No appreciable HSM.    GENITAL: no Rodriguez catheter  MSK: No joint effusions noted.  She does have pain with extension of her shoulders bilaterally limiting her range of motion.  The areas of erythema and swelling over her distal forearm/bilateral wrists have improved since yesterday.  SKIN: No new generalized rashes noted  PSYCHIATRIC: cooperative.  Appropriate mood and affect  NEURO: awake alert and oriented ×4.  Normal speech and cognition    Results Review:   I reviewed the patient's new clinical results.  I reviewed the patient's new imaging results and agree with the interpretation.  I reviewed the patient's other test results and agree with the interpretation    Results from last 7 days   Lab Units 05/22/24  0432 05/21/24  0528 05/20/24  1350   WBC 10*3/mm3 11.76* 10.00 13.99*   HEMOGLOBIN g/dL 10.5* 10.8* 12.2   HEMATOCRIT % 32.3* 33.3* 37.9   PLATELETS 10*3/mm3 525* 501* 609*     Results from last 7 days   Lab Units 05/22/24  0432   SODIUM mmol/L 138   POTASSIUM mmol/L 4.1   CHLORIDE mmol/L 105   CO2 mmol/L 22.0   BUN mg/dL 9   CREATININE mg/dL 0.58   GLUCOSE mg/dL 85   CALCIUM mg/dL 8.1*     Results from last 7 days   Lab Units 05/22/24  0432   ALK PHOS U/L 86   BILIRUBIN mg/dL <0.2   ALT (SGPT) U/L 24   AST (SGOT) U/L 16     Results from last 7 days   Lab Units 05/21/24  0528   SED RATE mm/hr 57*     Results from last 7 days   Lab Units 05/21/24  0528   CRP mg/dL 18.01*     Results from last 7 days   Lab Units " "05/21/24  0528   VANCOMYCIN RM mcg/mL 12.10     Results from last 7 days   Lab Units 05/20/24  1350   LACTATE mmol/L 1.8     Estimated Creatinine Clearance: 78.8 mL/min (by C-G formula based on SCr of 0.58 mg/dL).  CPK          5/21/2024    05:28   Common Labs   Creatine Kinase 120       Procalitonin Results:      Lab 05/20/24  1350   PROCALCITONIN 1.89*      Brief Urine Lab Results  (Last result in the past 365 days)        Color   Clarity   Blood   Leuk Est   Nitrite   Protein   CREAT   Urine HCG        05/20/24 1441 Dark Yellow   Turbid   Negative   Trace   Negative   30 mg/dL (1+)                  No results found for: \"SITE\", \"ALLENTEST\", \"PHART\", \"MBN1LYG\", \"PO2ART\", \"KJP0NIA\", \"BASEEXCESS\", \"T1BDFGWO\", \"HGBBG\", \"HCTABG\", \"OXYHEMOGLOBI\", \"METHHGBN\", \"CARBOXYHGB\", \"CO2CT\", \"BAROMETRIC\", \"MODALITY\", \"FIO2\"     Microbiology:  No results found for: \"ACANTHNAEG\", \"AFBCX\", \"BPERTUSSISCX\", \"BLOODCX\"  CSF Culture   Date Value Ref Range Status   05/20/2024 No growth  Preliminary     Urine Culture   Date Value Ref Range Status   05/14/2024 Final report  Final     No results found for: \"VIRALCULTU\", \"WOUNDCX\"     Urine Culture   Date Value Ref Range Status   05/14/2024 Final report  Final   (      Radiology:  Imaging Results (Last 72 Hours)       Procedure Component Value Units Date/Time    CT Chest Without Contrast Diagnostic [[id number]] Collected: 05/22/24 1242     Updated: 05/22/24 1251    Narrative:      CT CHEST WO CONTRAST DIAGNOSTIC, CT ABDOMEN PELVIS W CONTRAST    Date of Exam: 5/22/2024 12:11 PM EDT    Indication: cough. Abdominal pain    Comparison: None available.    Technique: Axial CT images were obtained of the chest without contrast administration. Axial CT images were obtained of the abdomen and pelvis after intravenous administration of 75 cc Isovue-300. Reconstructed coronal and sagittal images were also   obtained. Automated exposure control and iterative construction methods were " used.      Findings:    CT CHEST:  MEDIASTINUM: Unremarkable. Aortic and heart size are normal. No mass nor pericardial effusion.  CORONARY ARTERIES: No calcified atherosclerotic disease.  LUNGS: There is nodular and more confluent airspace disease within the lower lungs bilaterally, left more so than right. There is a degree of interlobular septal thickening/interstitial edema. Imaging features are most suggestive of multifocal pneumonia.   No single suspicious nodule is identified.  PLEURAL SPACE: There are small bilateral pleural effusions.        CT ABDOMEN AND PELVIS:   LIVER:  Unremarkable parenchyma without focal lesion.  BILIARY/GALLBLADDER:  Unremarkable  SPLEEN:  Unremarkable  PANCREAS:  Unremarkable  ADRENAL:  Unremarkable  KIDNEYS:  Unremarkable parenchyma with no solid mass identified. No obstruction.  No calculus identified.  GASTROINTESTINAL/MESENTERY:  No evidence of obstruction nor inflammation. The appendix is normal.  AORTA/IVC:  Normal caliber.    RETROPERITONEUM/LYMPH NODES:  Unremarkable    REPRODUCTIVE: There is a right-sided uterine fibroid. There is minimal free fluid in the pelvis, a frequent normal finding in females. No definitive etiology identified.  BLADDER:  Unremarkable    OSSEUS STRUCTURES:  Typical for age with no acute process identified.          Impression:      Impression:  1.Multifocal pneumonia with small bilateral parapneumonic effusions.  2.Minimal nonspecific free fluid in the pelvis, a frequent normal finding in females as no etiology identified.  3.Other incidental nonemergent findings as detailed above.        Electronically Signed: Reid Banerjee MD    5/22/2024 12:48 PM EDT    Workstation ID: [id number]    CT Abdomen Pelvis With Contrast [[id number]] Collected: 05/22/24 1242     Updated: 05/22/24 1251    Narrative:      CT CHEST WO CONTRAST DIAGNOSTIC, CT ABDOMEN PELVIS W CONTRAST    Date of Exam: 5/22/2024 12:11 PM EDT    Indication: cough. Abdominal pain    Comparison:  None available.    Technique: Axial CT images were obtained of the chest without contrast administration. Axial CT images were obtained of the abdomen and pelvis after intravenous administration of 75 cc Isovue-300. Reconstructed coronal and sagittal images were also   obtained. Automated exposure control and iterative construction methods were used.      Findings:    CT CHEST:  MEDIASTINUM: Unremarkable. Aortic and heart size are normal. No mass nor pericardial effusion.  CORONARY ARTERIES: No calcified atherosclerotic disease.  LUNGS: There is nodular and more confluent airspace disease within the lower lungs bilaterally, left more so than right. There is a degree of interlobular septal thickening/interstitial edema. Imaging features are most suggestive of multifocal pneumonia.   No single suspicious nodule is identified.  PLEURAL SPACE: There are small bilateral pleural effusions.        CT ABDOMEN AND PELVIS:   LIVER:  Unremarkable parenchyma without focal lesion.  BILIARY/GALLBLADDER:  Unremarkable  SPLEEN:  Unremarkable  PANCREAS:  Unremarkable  ADRENAL:  Unremarkable  KIDNEYS:  Unremarkable parenchyma with no solid mass identified. No obstruction.  No calculus identified.  GASTROINTESTINAL/MESENTERY:  No evidence of obstruction nor inflammation. The appendix is normal.  AORTA/IVC:  Normal caliber.    RETROPERITONEUM/LYMPH NODES:  Unremarkable    REPRODUCTIVE: There is a right-sided uterine fibroid. There is minimal free fluid in the pelvis, a frequent normal finding in females. No definitive etiology identified.  BLADDER:  Unremarkable    OSSEUS STRUCTURES:  Typical for age with no acute process identified.          Impression:      Impression:  1.Multifocal pneumonia with small bilateral parapneumonic effusions.  2.Minimal nonspecific free fluid in the pelvis, a frequent normal finding in females as no etiology identified.  3.Other incidental nonemergent findings as detailed above.        Electronically  Signed: Reid Banerjee MD    5/22/2024 12:48 PM EDT    Workstation ID: [id number]    XR Shoulder 2+ View Bilateral [[id number]] Collected: 05/22/24 0942     Updated: 05/22/24 0946    Narrative:      XR SHOULDER 2+ VW BILATERAL    Date of Exam: 5/22/2024 9:15 AM EDT    Indication: shoulder pain    Comparison: None available.    Findings:  No acute fracture is identified. No focal osseous abnormality is identified. Mild degenerative changes are present along both shoulders. The soft tissues are unremarkable.      Impression:      Impression:  1.No acute osseous process identified.  2.Mild degenerative changes as described above.      Electronically Signed: Man Puri MD    5/22/2024 9:43 AM EDT    Workstation ID: [id number]    CT Head Without Contrast [[id number]] Collected: 05/20/24 1533     Updated: 05/20/24 1537    Narrative:      CT HEAD WO CONTRAST    Date of Exam: 5/20/2024 3:21 PM EDT    Indication: HA, gen weakness.    Comparison: None available.    Technique: Axial CT images were obtained of the head without contrast administration.  Automated exposure control and iterative construction methods were used.      Findings:  Gray-white differentiation is maintained and there is no evidence of intracranial hemorrhage, mass or mass effect. The ventricles are normal in size and configuration. The orbits are normal. The paranasal sinuses are clear. The calvarium is intact.      Impression:      Impression:  No acute intracranial abnormality.        Electronically Signed: Ruperto Chavez MD    5/20/2024 3:34 PM EDT    Workstation ID: [id number]    XR Chest 1 View [[id number]] Collected: 05/20/24 1405     Updated: 05/20/24 1410    Narrative:      XR CHEST 1 VW    Date of Exam: 5/20/2024 1:45 PM EDT    Indication: Generalized weakness    Comparison: None available.    Findings:  The cardiomediastinal silhouette is within normal limits. Pulmonary vascularity appears normal. There is no focal airspace consolidation, pleural  effusion, or pneumothorax. There are calcified lymph nodes within the left hilar region likely related to   chronic granulomatous disease. There are degenerative changes of the thoracic spine.      Impression:      Impression:  1. No acute cardiopulmonary abnormality.      Electronically Signed: Burke Ray    5/20/2024 2:07 PM EDT    Workstation ID: [id number]        I independently read the patient's CT chest from today    IMPRESSION:     Problems:  Sepsis with recent fevers at home, leukocytosis with neutrophilia, bandemia, tachycardia- Etiology remains somewhat unclear although she now has pneumonia noted on CT chest which could be the culprit for her symptoms.  Unclear why her respiratory symptoms started after her headaches and fevers.  Additionally she has ongoing bilateral shoulder pain.  It is possible that she has reactive arthritis due to an underlying infection but unclear to me why she did not initially have pulmonary symptoms.  Pneumonia-new  Mild hypoxia-new.  Now on 2 L nasal cannula supplemental O2  Acute kidney injury-Improved  Pyuria-Urine culture was no growth  Headaches  Pain, swelling, and erythema just proximal to her bilateral wrists over the dorsal aspect of her arms.  It would be unusual to have bilateral cellulitis in this distribution.  More likely an immunologic phenomena.  Could be due to an underlying rheumatologic issue.  Erythema nodosum is usually over the lower extremities but is a possibility. Has now improved    RECOMMENDATIONS:    -Continue to follow CBC and CMP closely  -Follow blood cultures, urine culture, and CSF cultures- NGTD  -Follow pending KAYLA and ANCA panels  -Follow urine histo Ag-Pending  -Send Urine strep pneumo and urine Legionella antigens  -MRSA PCR naris sent today-negative  -Repeat respiratory PCR panel today-negative  -CT chest/abdomen/pelvis done today with signs of pneumonia  -Continue ceftriaxone but okay to change to 2 g IV every 24 hours  dosing  -Started doxycycline 100 mg by mouth twice a day for some atypical coverage.  -Stop vancomycin as no evidence for MRSA    I reviewed the patient's labs, micro, radiographs, and medications today    I discussed in length with the patient and two of her brothers at bedside today    I discussed the patient's complex case with Dr. Mora again today    Thank you for asking me to see Linda Good.  Our group would be pleased to follow this patient over the course of their hospitalization and assist with outpatient antimicrobial therapy, as indicated.  Further recommendations depend on the results of the cultures and clinical course.    Complex MDM    Man Mandel MD  5/22/2024

## 2024-05-23 NOTE — PROGRESS NOTES
[facility] Medicine Services  PROGRESS NOTE    Patient Name: Linda Good  : 1955  MRN: [id number]    Date of Admission: 2024  Primary Care Physician: Julissa Vernon PA-C    Subjective   Subjective     CC:  Fever    HPI:  Patient did have some chest pain this morning EKG was within normal limits.  Patient reports dyspnea is likely related to her anxiety.  Her chest pain resolved.  Denies shortness of breath.Afebrile.       Objective   Objective     Vital Signs:   Temp:  [97.7 °F (36.5 °C)-98.3 °F (36.8 °C)] 98.1 °F (36.7 °C)  Heart Rate:  [78-97] 78  Resp:  [17-18] 17  BP: (140-153)/(74-85) 140/80  Flow (L/min):  [2] 2     Physical Exam:  Constitutional: No acute distress, awake, alert  HENT: NCAT, mucous membranes moist  Respiratory: Clear to auscultation bilaterally, respiratory effort normal   Cardiovascular: RRR, no murmurs, rubs, or gallops  Gastrointestinal: Positive bowel sounds, soft, nontender, nondistended  Musculoskeletal: No bilateral ankle edema  Psychiatric: Cooperative yet anxious.  Neurologic: Oriented x 3, speech clear  Skin: Erythema over bilateral second metacarpal joint.    Results Reviewed:  LAB RESULTS:      Lab 24  0500 24  0432 24  0528 24  1350   WBC 12.15* 11.76* 10.00 13.99*   HEMOGLOBIN 10.8* 10.5* 10.8* 12.2   HEMATOCRIT 33.1* 32.3* 33.3* 37.9   PLATELETS 526* 525* 501* 609*   NEUTROS ABS 9.19* 9.17* 8.80* 12.03*   IMMATURE GRANS (ABS) 0.17*  --   --   --    LYMPHS ABS 1.80  --   --   --    MONOS ABS 0.79  --   --   --    EOS ABS 0.14 0.35 0.00 0.00   MCV 96.8 97.9* 98.5* 99.0*   SED RATE  --   --  57*  --    CRP  --   --  18.01*  --    PROCALCITONIN  --   --   --  1.89*   LACTATE  --   --   --  1.8         Lab 24  0500 24  0432 24  0528 24  1350   SODIUM 137 138 140 132*   POTASSIUM 3.6 4.1 4.1 4.2   CHLORIDE 101 105 107 92*   CO2 24.0 22.0 23.0 22.0   ANION GAP 12.0 11.0 10.0 18.0*   BUN 8 9  24* 36*   CREATININE 0.48* 0.58 1.22* 2.00*   EGFR 104.0 99.3 48.7* 26.9*   GLUCOSE 79 85 96 118*   CALCIUM 7.8* 8.1* 8.3* 9.2   MAGNESIUM  --   --   --  2.1   TSH  --   --   --  2.020         Lab 05/22/24  0432 05/21/24  0528 05/20/24  1350   TOTAL PROTEIN 5.4* 5.5* 7.4   ALBUMIN 3.3* 3.4* 3.9   GLOBULIN 2.1 2.1 3.5   ALT (SGPT) 24 34* 50*   AST (SGOT) 16 19 26   BILIRUBIN <0.2 <0.2 0.2   ALK PHOS 86 92 115                 Lab 05/21/24  0528   FERRITIN 469.80*         Brief Urine Lab Results  (Last result in the past 365 days)        Color   Clarity   Blood   Leuk Est   Nitrite   Protein   CREAT   Urine HCG        05/20/24 1441 Dark Yellow   Turbid   Negative   Trace   Negative   30 mg/dL (1+)                   Cultures:  Blood Culture   Date Value Ref Range Status   05/20/2024 No growth at 2 days  Preliminary   05/20/2024 No growth at 2 days  Preliminary     Urine Culture   Date Value Ref Range Status   05/20/2024 No growth  Final       Microbiology Results Abnormal       Procedure Component Value - Date/Time    Culture, CSF - Cerebrospinal Fluid, Lumbar Puncture [[id number]] Collected: 05/20/24 1830    Lab Status: Final result Specimen: Cerebrospinal Fluid from Lumbar Puncture Updated: 05/23/24 0722     CSF Culture No growth at 3 days     Gram Stain Moderate (3+) Red blood cells      Occasional WBCs seen      No organisms seen    S. Pneumo Ag Urine or CSF - Urine, Urine, Clean Catch [[id number]]  (Normal) Collected: 05/22/24 1706    Lab Status: Final result Specimen: Urine, Clean Catch Updated: 05/23/24 0006     Strep Pneumo Ag Negative    Legionella Antigen, Urine - Urine, Urine, Clean Catch [[id number]]  (Normal) Collected: 05/22/24 1706    Lab Status: Final result Specimen: Urine, Clean Catch Updated: 05/23/24 0006     LEGIONELLA ANTIGEN, URINE Negative    MRSA Screen, PCR (Inpatient) - Swab, Nares [[id number]]  (Normal) Collected: 05/22/24 1436    Lab Status: Final result Specimen: Swab from Nares Updated:  05/22/24 1618     MRSA PCR Negative    Narrative:      The negative predictive value of this diagnostic test is high and should only be used to consider de-escalating anti-MRSA therapy. A positive result may indicate colonization with MRSA and must be correlated clinically.  MRSA Negative    Respiratory Panel PCR w/COVID-19(SARS-CoV-2) [facility]/YENI/[facility]A/PAD/COR/LEONA In-House, NP Swab in UTM/VTM, 2 HR TAT - Swab, Nasopharynx [[id number]]  (Normal) Collected: 05/22/24 1436    Lab Status: Final result Specimen: Swab from Nasopharynx Updated: 05/22/24 1556     ADENOVIRUS, PCR Not Detected     Coronavirus 229E Not Detected     Coronavirus HKU1 Not Detected     Coronavirus NL63 Not Detected     Coronavirus OC43 Not Detected     COVID19 Not Detected     Human Metapneumovirus Not Detected     Human Rhinovirus/Enterovirus Not Detected     Influenza A PCR Not Detected     Influenza B PCR Not Detected     Parainfluenza Virus 1 Not Detected     Parainfluenza Virus 2 Not Detected     Parainfluenza Virus 3 Not Detected     Parainfluenza Virus 4 Not Detected     RSV, PCR Not Detected     Bordetella pertussis pcr Not Detected     Bordetella parapertussis PCR Not Detected     Chlamydophila pneumoniae PCR Not Detected     Mycoplasma pneumo by PCR Not Detected    Narrative:      In the setting of a positive respiratory panel with a viral infection PLUS a negative procalcitonin without other underlying concern for bacterial infection, consider observing off antibiotics or discontinuation of antibiotics and continue supportive care. If the respiratory panel is positive for atypical bacterial infection (Bordetella pertussis, Chlamydophila pneumoniae, or Mycoplasma pneumoniae), consider antibiotic de-escalation to target atypical bacterial infection.    Blood Culture - Blood, Arm, Right [[id number]]  (Normal) Collected: 05/20/24 1438    Lab Status: Preliminary result Specimen: Blood from Arm, Right Updated: 05/22/24 1500     Blood Culture No  growth at 2 days    Blood Culture - Blood, Arm, Left [[id number]]  (Normal) Collected: 05/20/24 1350    Lab Status: Preliminary result Specimen: Blood from Arm, Left Updated: 05/22/24 1415     Blood Culture No growth at 2 days    Urine Culture - Urine, Urine, Clean Catch [[id number]]  (Normal) Collected: 05/20/24 1441    Lab Status: Final result Specimen: Urine, Clean Catch Updated: 05/22/24 0921     Urine Culture No growth    Meningitis / Encephalitis Panel, PCR - Cerebrospinal Fluid, Lumbar Puncture [[id number]]  (Normal) Collected: 05/20/24 1830    Lab Status: Final result Specimen: Cerebrospinal Fluid from Lumbar Puncture Updated: 05/20/24 2115     ESCHERICHIA COLI K1, PCR Not Detected     HAEMOPHILUS INFLUENZAE, PCR Not Detected     LISTERIA MONOCYTOGENES, PCR Not Detected     NEISSERIA MENINGITIDIS, PCR Not Detected     STREPTOCOCCUS AGALACTIAE, PCR Not Detected     STREPTOCOCCUS PNEUMONIAE, PCR Not Detected     CYTOMEGALOVIRUS (CMV), PCR Not Detected     ENTEROVIRUS, PCR Not Detected     HERPES SIMPLEX VIRUS 1 (HSV-1), PCR Not Detected     HERPES SIMPLEX VIRUS 2 (HSV-2), PCR Not Detected     HUMAN PARECHOVIRUS, PCR Not Detected     VARICELLA ZOSTER VIRUS (VZV), PCR Not Detected     CRYPTOCOCCUS NEOFORMANS / GATTII, PCR Not Detected     HUMAN HERPES VIRUS 6 PCR Not Detected    Respiratory Panel PCR w/COVID-19(SARS-CoV-2) [facility]/YENI/[facility] In-House, NP Swab in UTM/VTM, 2 HR TAT - Swab, Nasopharynx [[id number]]  (Normal) Collected: 05/20/24 1434    Lab Status: Final result Specimen: Swab from Nasopharynx Updated: 05/20/24 1546     ADENOVIRUS, PCR Not Detected     Coronavirus 229E Not Detected     Coronavirus HKU1 Not Detected     Coronavirus NL63 Not Detected     Coronavirus OC43 Not Detected     COVID19 Not Detected     Human Metapneumovirus Not Detected     Human Rhinovirus/Enterovirus Not Detected     Influenza A PCR Not Detected     Influenza B PCR Not Detected     Parainfluenza Virus 1 Not Detected      Parainfluenza Virus 2 Not Detected     Parainfluenza Virus 3 Not Detected     Parainfluenza Virus 4 Not Detected     RSV, PCR Not Detected     Bordetella pertussis pcr Not Detected     Bordetella parapertussis PCR Not Detected     Chlamydophila pneumoniae PCR Not Detected     Mycoplasma pneumo by PCR Not Detected    Narrative:      In the setting of a positive respiratory panel with a viral infection PLUS a negative procalcitonin without other underlying concern for bacterial infection, consider observing off antibiotics or discontinuation of antibiotics and continue supportive care. If the respiratory panel is positive for atypical bacterial infection (Bordetella pertussis, Chlamydophila pneumoniae, or Mycoplasma pneumoniae), consider antibiotic de-escalation to target atypical bacterial infection.            CT Chest Without Contrast Diagnostic    Result Date: 5/22/2024  CT CHEST WO CONTRAST DIAGNOSTIC, CT ABDOMEN PELVIS W CONTRAST Date of Exam: 5/22/2024 12:11 PM EDT Indication: cough. Abdominal pain Comparison: None available. Technique: Axial CT images were obtained of the chest without contrast administration. Axial CT images were obtained of the abdomen and pelvis after intravenous administration of 75 cc Isovue-300. Reconstructed coronal and sagittal images were also obtained. Automated exposure control and iterative construction methods were used. Findings: CT CHEST: MEDIASTINUM: Unremarkable. Aortic and heart size are normal. No mass nor pericardial effusion. CORONARY ARTERIES: No calcified atherosclerotic disease. LUNGS: There is nodular and more confluent airspace disease within the lower lungs bilaterally, left more so than right. There is a degree of interlobular septal thickening/interstitial edema. Imaging features are most suggestive of multifocal pneumonia.  No single suspicious nodule is identified. PLEURAL SPACE: There are small bilateral pleural effusions. CT ABDOMEN AND PELVIS:  LIVER:   Unremarkable parenchyma without focal lesion. BILIARY/GALLBLADDER:  Unremarkable SPLEEN:  Unremarkable PANCREAS:  Unremarkable ADRENAL:  Unremarkable KIDNEYS:  Unremarkable parenchyma with no solid mass identified. No obstruction.  No calculus identified. GASTROINTESTINAL/MESENTERY:  No evidence of obstruction nor inflammation. The appendix is normal. AORTA/IVC:  Normal caliber. RETROPERITONEUM/LYMPH NODES:  Unremarkable REPRODUCTIVE: There is a right-sided uterine fibroid. There is minimal free fluid in the pelvis, a frequent normal finding in females. No definitive etiology identified. BLADDER:  Unremarkable OSSEUS STRUCTURES:  Typical for age with no acute process identified.     Impression: Impression: 1.Multifocal pneumonia with small bilateral parapneumonic effusions. 2.Minimal nonspecific free fluid in the pelvis, a frequent normal finding in females as no etiology identified. 3.Other incidental nonemergent findings as detailed above. Electronically Signed: Reid Banerjee MD  5/22/2024 12:48 PM EDT  Workstation ID: [id number]    CT Abdomen Pelvis With Contrast    Result Date: 5/22/2024  CT CHEST WO CONTRAST DIAGNOSTIC, CT ABDOMEN PELVIS W CONTRAST Date of Exam: 5/22/2024 12:11 PM EDT Indication: cough. Abdominal pain Comparison: None available. Technique: Axial CT images were obtained of the chest without contrast administration. Axial CT images were obtained of the abdomen and pelvis after intravenous administration of 75 cc Isovue-300. Reconstructed coronal and sagittal images were also obtained. Automated exposure control and iterative construction methods were used. Findings: CT CHEST: MEDIASTINUM: Unremarkable. Aortic and heart size are normal. No mass nor pericardial effusion. CORONARY ARTERIES: No calcified atherosclerotic disease. LUNGS: There is nodular and more confluent airspace disease within the lower lungs bilaterally, left more so than right. There is a degree of interlobular septal  thickening/interstitial edema. Imaging features are most suggestive of multifocal pneumonia.  No single suspicious nodule is identified. PLEURAL SPACE: There are small bilateral pleural effusions. CT ABDOMEN AND PELVIS:  LIVER:  Unremarkable parenchyma without focal lesion. BILIARY/GALLBLADDER:  Unremarkable SPLEEN:  Unremarkable PANCREAS:  Unremarkable ADRENAL:  Unremarkable KIDNEYS:  Unremarkable parenchyma with no solid mass identified. No obstruction.  No calculus identified. GASTROINTESTINAL/MESENTERY:  No evidence of obstruction nor inflammation. The appendix is normal. AORTA/IVC:  Normal caliber. RETROPERITONEUM/LYMPH NODES:  Unremarkable REPRODUCTIVE: There is a right-sided uterine fibroid. There is minimal free fluid in the pelvis, a frequent normal finding in females. No definitive etiology identified. BLADDER:  Unremarkable OSSEUS STRUCTURES:  Typical for age with no acute process identified.     Impression: Impression: 1.Multifocal pneumonia with small bilateral parapneumonic effusions. 2.Minimal nonspecific free fluid in the pelvis, a frequent normal finding in females as no etiology identified. 3.Other incidental nonemergent findings as detailed above. Electronically Signed: Reid Banerjee MD  5/22/2024 12:48 PM EDT  Workstation ID: [id number]    XR Shoulder 2+ View Bilateral    Result Date: 5/22/2024  XR SHOULDER 2+ VW BILATERAL Date of Exam: 5/22/2024 9:15 AM EDT Indication: shoulder pain Comparison: None available. Findings: No acute fracture is identified. No focal osseous abnormality is identified. Mild degenerative changes are present along both shoulders. The soft tissues are unremarkable.     Impression: Impression: 1.No acute osseous process identified. 2.Mild degenerative changes as described above. Electronically Signed: Man Puri MD  5/22/2024 9:43 AM EDT  Workstation ID: [id number]    Adult Transthoracic Echo Complete W/ Cont if Necessary Per Protocol    Result Date: 5/21/2024    Left  ventricular systolic function is normal. Left ventricular ejection fraction appears to be 61 - 65%.   Mild mitral valve regurgitation is present.   Mild tricuspid valve regurgitation is present.   Estimated right ventricular systolic pressure from tricuspid regurgitation is normal (<35 mmHg).      Results for orders placed during the hospital encounter of 05/20/24    Adult Transthoracic Echo Complete W/ Cont if Necessary Per Protocol    Interpretation Summary    Left ventricular systolic function is normal. Left ventricular ejection fraction appears to be 61 - 65%.    Mild mitral valve regurgitation is present.    Mild tricuspid valve regurgitation is present.    Estimated right ventricular systolic pressure from tricuspid regurgitation is normal (<35 mmHg).      Current medications:  Scheduled Meds:cefTRIAXone, 2,000 mg, Intravenous, Q24H  doxycycline, 100 mg, Oral, Q12H  heparin (porcine), 5,000 Units, Subcutaneous, Q8H  Lidocaine, 1 patch, Transdermal, Q24H  rosuvastatin, 5 mg, Oral, Nightly  sodium chloride, 10 mL, Intravenous, Q12H  vitamin B-1, 50 mg, Oral, Daily      Continuous Infusions:sodium chloride, 75 mL/hr, Last Rate: 75 mL/hr (05/22/24 1532)      PRN Meds:.  acetaminophen    senna-docusate sodium **AND** polyethylene glycol **AND** bisacodyl **AND** bisacodyl    Morphine    naloxone    ondansetron ODT **OR** ondansetron    oxyCODONE    sodium chloride    sodium chloride    temazepam    Assessment & Plan   Assessment & Plan     Active Hospital Problems    Diagnosis  POA    **Fever [R50.9]  Yes    Sepsis [A41.9]  Yes    BLADE (acute kidney injury) [N17.9]  Yes    Bandemia [D72.825]  Yes    Primary hypertension [I10]  Yes      Resolved Hospital Problems   No resolved problems to display.        Brief Hospital Course to date:  Linda Good is a 69 y.o. female  with h/o HTN and HLD who reported fever up to 101.9 and off/on generalized headache, myalgias, night sweats, fatigue, and loss of appetite x about  12 days prior to presentation.      This patient's problems and plans were partially entered by my partner and updated as appropriate by me 05/23/24.     Sepsis with Bandemia, tachycardia  Multifocal pneumonia  Headache  --Infectious versus rheumatologic issue; does have chronic upper chest erythematous rash; on exam also has new erythema on bilateral wrists and right second MCP joint  --s/p LP with negative meningitis panel; appeared to have traumatic LP; not consistent with CNS infection  --No evidence of joint infection, TTE with no evidence of endocarditis  --s/p recent macrobid and then cefdinir for presumed UTI  --s/p vanc, rocephin, and ampicillin in ER.   --negative for COVID and flu.    --Respiratory panel negative on first testing; ID recommended repeat full respiratory panel (ordered)  --follow blood cultures  --no hardware, [place]  --consulted ID, discussed with Dr. Man Mandel on 5/22  --Ferritin mildly elevated 469, CK1 20, ESR elevated 57, CRP elevated 18  --Follow-up urine histo antigen  --follow-up ANCA  -- CT imaging obtained on 5/22 consistent with multifocal pneumonia; CT abdomen/pelvis with contrast unrevealing  -- Continue ceftriaxone, doxycycline added on  -- Legionella antigen negative, strep pneumo antigen negative.  MRSA screen negative     Pain, swelling, erythema of dorsal hands  --Not consistent with cellulitis; may represent reactive arthritis  --As needed pain control  --05/23: Spoke with Dr. Mandel on 05/23 can start Meloxicam and monitor renal function as high suspicion for reactive arthritis      Bilateral shoulder pain  --X-rays unrevealing     BLADE, resolved  --Baseline creatinine around 0.75; creatinine 2 on admission, normalized with IV fluids  --05/23 Lisinopril was restarted however will transition to amlodipine given trial of Meloxicam. Hold HCTZ     HTN  HLD  --continue statin     Anxiety, situational   -Atarax 10 mg TID prn      Expected Discharge Location and Transportation: home with [facility]   Expected Discharge 05/25/2024  Expected Discharge Date: 5/24/2024; Expected Discharge Time:      DVT prophylaxis:  Medical DVT prophylaxis orders are present.         AM-PAC 6 Clicks Score (PT): 24 (05/22/24 2000)    CODE STATUS:   Code Status and Medical Interventions:   Ordered at: 05/20/24 5608     Level Of Support Discussed With:    Patient     Code Status (Patient has no pulse and is not breathing):    CPR (Attempt to Resuscitate)     Medical Interventions (Patient has pulse or is breathing):    Full Support       Sweta Gracia MD  05/23/24

## 2024-05-24 ENCOUNTER — DOCUMENTATION (OUTPATIENT)
Dept: HOME HEALTH SERVICES | Facility: HOME HEALTHCARE | Age: 69
End: 2024-05-24
Payer: MEDICARE

## 2024-05-24 ENCOUNTER — TELEPHONE (OUTPATIENT)
Dept: FAMILY MEDICINE CLINIC | Facility: CLINIC | Age: 69
End: 2024-05-24
Payer: MEDICARE

## 2024-05-24 ENCOUNTER — APPOINTMENT (OUTPATIENT)
Dept: GENERAL RADIOLOGY | Facility: HOSPITAL | Age: 69
DRG: 871 | End: 2024-05-24
Payer: MEDICARE

## 2024-05-24 LAB
ANION GAP SERPL CALCULATED.3IONS-SCNC: 10 MMOL/L (ref 5–15)
BASOPHILS # BLD AUTO: 0.11 10*3/MM3 (ref 0–0.2)
BASOPHILS NFR BLD AUTO: 1 % (ref 0–1.5)
BUN SERPL-MCNC: 8 MG/DL (ref 8–23)
BUN/CREAT SERPL: 14.8 (ref 7–25)
CALCIUM SPEC-SCNC: 8.6 MG/DL (ref 8.6–10.5)
CHLORIDE SERPL-SCNC: 103 MMOL/L (ref 98–107)
CO2 SERPL-SCNC: 28 MMOL/L (ref 22–29)
CREAT SERPL-MCNC: 0.54 MG/DL (ref 0.57–1)
DEPRECATED RDW RBC AUTO: 45.6 FL (ref 37–54)
EGFRCR SERPLBLD CKD-EPI 2021: 101.1 ML/MIN/1.73
EOSINOPHIL # BLD AUTO: 0.37 10*3/MM3 (ref 0–0.4)
EOSINOPHIL NFR BLD AUTO: 3.4 % (ref 0.3–6.2)
ERYTHROCYTE [DISTWIDTH] IN BLOOD BY AUTOMATED COUNT: 12.6 % (ref 12.3–15.4)
GLUCOSE SERPL-MCNC: 80 MG/DL (ref 65–99)
HCT VFR BLD AUTO: 35.8 % (ref 34–46.6)
HGB BLD-MCNC: 11.6 G/DL (ref 12–15.9)
IMM GRANULOCYTES # BLD AUTO: 0.44 10*3/MM3 (ref 0–0.05)
IMM GRANULOCYTES NFR BLD AUTO: 4 % (ref 0–0.5)
LYMPHOCYTES # BLD AUTO: 2.3 10*3/MM3 (ref 0.7–3.1)
LYMPHOCYTES NFR BLD AUTO: 20.9 % (ref 19.6–45.3)
MCH RBC QN AUTO: 31.7 PG (ref 26.6–33)
MCHC RBC AUTO-ENTMCNC: 32.4 G/DL (ref 31.5–35.7)
MCV RBC AUTO: 97.8 FL (ref 79–97)
MONOCYTES # BLD AUTO: 0.93 10*3/MM3 (ref 0.1–0.9)
MONOCYTES NFR BLD AUTO: 8.5 % (ref 5–12)
NEUTROPHILS NFR BLD AUTO: 6.84 10*3/MM3 (ref 1.7–7)
NEUTROPHILS NFR BLD AUTO: 62.2 % (ref 42.7–76)
NRBC BLD AUTO-RTO: 0 /100 WBC (ref 0–0.2)
PLATELET # BLD AUTO: 566 10*3/MM3 (ref 140–450)
PMV BLD AUTO: 10 FL (ref 6–12)
POTASSIUM SERPL-SCNC: 3.9 MMOL/L (ref 3.5–5.2)
QT INTERVAL: 344 MS
QTC INTERVAL: 432 MS
RBC # BLD AUTO: 3.66 10*6/MM3 (ref 3.77–5.28)
SODIUM SERPL-SCNC: 141 MMOL/L (ref 136–145)
WBC NRBC COR # BLD AUTO: 10.99 10*3/MM3 (ref 3.4–10.8)

## 2024-05-24 PROCEDURE — 25010000002 FUROSEMIDE PER 20 MG: Performed by: INTERNAL MEDICINE

## 2024-05-24 PROCEDURE — 80048 BASIC METABOLIC PNL TOTAL CA: CPT | Performed by: FAMILY MEDICINE

## 2024-05-24 PROCEDURE — 25010000002 KETOROLAC TROMETHAMINE PER 15 MG

## 2024-05-24 PROCEDURE — 97161 PT EVAL LOW COMPLEX 20 MIN: CPT

## 2024-05-24 PROCEDURE — 25010000002 DIPHENHYDRAMINE PER 50 MG

## 2024-05-24 PROCEDURE — 25010000002 CEFTRIAXONE PER 250 MG: Performed by: INTERNAL MEDICINE

## 2024-05-24 PROCEDURE — 25010000002 HEPARIN (PORCINE) PER 1000 UNITS: Performed by: INTERNAL MEDICINE

## 2024-05-24 PROCEDURE — 99222 1ST HOSP IP/OBS MODERATE 55: CPT

## 2024-05-24 PROCEDURE — 85025 COMPLETE CBC W/AUTO DIFF WBC: CPT | Performed by: FAMILY MEDICINE

## 2024-05-24 PROCEDURE — 99232 SBSQ HOSP IP/OBS MODERATE 35: CPT | Performed by: FAMILY MEDICINE

## 2024-05-24 PROCEDURE — 71045 X-RAY EXAM CHEST 1 VIEW: CPT

## 2024-05-24 RX ORDER — FUROSEMIDE 10 MG/ML
40 INJECTION INTRAMUSCULAR; INTRAVENOUS ONCE
Status: COMPLETED | OUTPATIENT
Start: 2024-05-24 | End: 2024-05-24

## 2024-05-24 RX ORDER — PROCHLORPERAZINE EDISYLATE 5 MG/ML
5 INJECTION INTRAMUSCULAR; INTRAVENOUS EVERY 6 HOURS PRN
Status: DISCONTINUED | OUTPATIENT
Start: 2024-05-24 | End: 2024-05-25 | Stop reason: HOSPADM

## 2024-05-24 RX ORDER — KETOROLAC TROMETHAMINE 15 MG/ML
15 INJECTION, SOLUTION INTRAMUSCULAR; INTRAVENOUS EVERY 6 HOURS PRN
Status: DISCONTINUED | OUTPATIENT
Start: 2024-05-24 | End: 2024-05-25 | Stop reason: HOSPADM

## 2024-05-24 RX ORDER — SIMETHICONE 80 MG
80 TABLET,CHEWABLE ORAL ONCE AS NEEDED
Status: COMPLETED | OUTPATIENT
Start: 2024-05-24 | End: 2024-05-24

## 2024-05-24 RX ORDER — DIPHENHYDRAMINE HYDROCHLORIDE 50 MG/ML
25 INJECTION INTRAMUSCULAR; INTRAVENOUS EVERY 8 HOURS PRN
Status: DISCONTINUED | OUTPATIENT
Start: 2024-05-24 | End: 2024-05-25 | Stop reason: HOSPADM

## 2024-05-24 RX ORDER — UREA 10 %
5 LOTION (ML) TOPICAL NIGHTLY PRN
Status: DISCONTINUED | OUTPATIENT
Start: 2024-05-24 | End: 2024-05-25 | Stop reason: HOSPADM

## 2024-05-24 RX ADMIN — DOXYCYCLINE 100 MG: 100 CAPSULE ORAL at 09:19

## 2024-05-24 RX ADMIN — OXYCODONE 5 MG: 5 TABLET ORAL at 20:28

## 2024-05-24 RX ADMIN — MELOXICAM 7.5 MG: 7.5 TABLET ORAL at 09:19

## 2024-05-24 RX ADMIN — HEPARIN SODIUM 5000 UNITS: 5000 INJECTION INTRAVENOUS; SUBCUTANEOUS at 20:28

## 2024-05-24 RX ADMIN — POLYETHYLENE GLYCOL 3350 17 G: 17 POWDER, FOR SOLUTION ORAL at 15:14

## 2024-05-24 RX ADMIN — HEPARIN SODIUM 5000 UNITS: 5000 INJECTION INTRAVENOUS; SUBCUTANEOUS at 14:08

## 2024-05-24 RX ADMIN — AMLODIPINE BESYLATE 5 MG: 5 TABLET ORAL at 15:11

## 2024-05-24 RX ADMIN — TEMAZEPAM 15 MG: 15 CAPSULE ORAL at 20:28

## 2024-05-24 RX ADMIN — SIMETHICONE 80 MG: 80 TABLET, CHEWABLE ORAL at 20:29

## 2024-05-24 RX ADMIN — Medication 10 ML: at 20:29

## 2024-05-24 RX ADMIN — KETOROLAC TROMETHAMINE 15 MG: 15 INJECTION, SOLUTION INTRAMUSCULAR; INTRAVENOUS at 14:53

## 2024-05-24 RX ADMIN — PANTOPRAZOLE SODIUM 40 MG: 40 TABLET, DELAYED RELEASE ORAL at 05:58

## 2024-05-24 RX ADMIN — DOXYCYCLINE 100 MG: 100 CAPSULE ORAL at 20:28

## 2024-05-24 RX ADMIN — ACETAMINOPHEN 650 MG: 325 TABLET ORAL at 01:26

## 2024-05-24 RX ADMIN — HYDROXYZINE HYDROCHLORIDE 10 MG: 10 TABLET ORAL at 14:54

## 2024-05-24 RX ADMIN — FUROSEMIDE 40 MG: 10 INJECTION, SOLUTION INTRAMUSCULAR; INTRAVENOUS at 02:05

## 2024-05-24 RX ADMIN — Medication 5 MG: at 20:29

## 2024-05-24 RX ADMIN — HEPARIN SODIUM 5000 UNITS: 5000 INJECTION INTRAVENOUS; SUBCUTANEOUS at 05:59

## 2024-05-24 RX ADMIN — ROSUVASTATIN 5 MG: 10 TABLET, FILM COATED ORAL at 20:28

## 2024-05-24 RX ADMIN — THIAMINE HCL TAB 100 MG 50 MG: 100 TAB at 09:19

## 2024-05-24 RX ADMIN — Medication 10 ML: at 09:22

## 2024-05-24 RX ADMIN — OXYCODONE 5 MG: 5 TABLET ORAL at 05:58

## 2024-05-24 RX ADMIN — DIPHENHYDRAMINE HYDROCHLORIDE 25 MG: 50 INJECTION INTRAMUSCULAR; INTRAVENOUS at 14:54

## 2024-05-24 RX ADMIN — CEFTRIAXONE 2000 MG: 2 INJECTION, POWDER, FOR SOLUTION INTRAMUSCULAR; INTRAVENOUS at 09:21

## 2024-05-24 NOTE — THERAPY EVALUATION
Patient Name: Linda Good  : 1955    MRN: [id number]                              Today's Date: 2024       Admit Date: 2024    Visit Dx:     ICD-10-CM ICD-9-CM   1. SIRS (systemic inflammatory response syndrome)  R65.10 995.90   2. Leukocytosis, unspecified type  D72.829 288.60   3. BLADE (acute kidney injury)  N17.9 584.9   4. Headache, unspecified headache type  R51.9 784.0     Patient Active Problem List   Diagnosis    Mixed hyperlipidemia    Primary hypertension    Combined forms of age-related cataract of both eyes    History of histoplasmosis    Nuclear senile cataract    Palpitations    Chest pain    Fever    BLADE (acute kidney injury)    Bandemia    Sepsis     Past Medical History:   Diagnosis Date    Diabetes mellitus 3/1/1993    Gestational    Hyperlipidemia     Hypertension     Pregnancy     ,,,     Past Surgical History:   Procedure Laterality Date    BREAST SURGERY      Lumpectomy/Benign    COLONOSCOPY      INTERVENTIONAL RADIOLOGY PROCEDURE N/A 2024    Procedure: IR fluoroscopy guided lumbar puncture diagnostic;  Surgeon: Epi Blackburn MD;  Location: [facility] INVASIVE LOCATION;  Service: Interventional Radiology;  Laterality: N/A;    TUBAL ABDOMINAL LIGATION        General Information       Row Name 24 1427          Physical Therapy Time and Intention    Document Type evaluation (P)   -EM     Mode of Treatment individual therapy;physical therapy (P)   -EM       Row Name 24 1427          General Information    Patient Profile Reviewed yes (P)   -EM     Prior Level of Function independent:;all household mobility;community mobility;gait;transfer;bed mobility;ADL's;driving (P)   Denies recent falls or AD use  -EM     Existing Precautions/Restrictions fall;oxygen therapy device and L/min (P)   Monitor BP  -EM     Barriers to Rehab medically complex (P)   -EM       Row Name 24 1427          Living Environment    People in Home  child(cheli), adult;spouse (P)   -EM       Row Name 05/24/24 1427          Home Main Entrance    Number of Stairs, Main Entrance four (P)   -EM     Stair Railings, Main Entrance other (see comments) (P)   2 stairs with sanjuanita hand rail 2 w/o  -EM       Row Name 05/24/24 1427          Stairs Within Home, Primary    Stairs, Within Home, Primary All needs met on main level. (P)   -EM       Row Name 05/24/24 1427          Cognition    Orientation Status (Cognition) oriented x 4 (P)   -EM       Row Name 05/24/24 1427          Safety Issues, Functional Mobility    Safety Issues Affecting Function (Mobility) insight into deficits/self-awareness;awareness of need for assistance;safety precaution awareness (P)   -EM     Impairments Affecting Function (Mobility) strength;shortness of breath;endurance/activity tolerance (P)   -EM               User Key  (r) = Recorded By, (t) = Taken By, (c) = Cosigned By      Initials Name Provider Type    EM Uri Wells, PT Student PT Student                   Mobility       Row Name 05/24/24 1430          Bed Mobility    Bed Mobility supine-sit (P)   -EM     Rolling Right [place] (Bed Mobility) -- (P)   -EM     Supine-Sit [place] (Bed Mobility) standby assist;verbal cues;1 person assist (P)   -EM     Assistive Device (Bed Mobility) head of bed elevated (P)   -EM     Comment, (Bed Mobility) No issues noted (P)   -EM       Row Name 05/24/24 1430          Transfers    Comment, (Transfers) Activity limited by elevated BP. RN approved gait training for limited distance. (P)   -EM       Row Name 05/24/24 1430          Sit-Stand Transfer    Sit-Stand [place] (Transfers) verbal cues;1 person assist;contact guard (P)   -EM       Row Name 05/24/24 1430          Gait/Stairs (Locomotion)    [place] Level (Gait) 1 person assist;contact guard;verbal cues (P)   -EM     Assistive Device (Gait) other (see comments) (P)   No AD.  -EM     Patient was able to Ambulate yes (P)   -EM      Distance in Feet (Gait) 40 (P)   -EM     Deviations/Abnormal Patterns (Gait) [place] decreased;gait speed decreased;stride length decreased (P)   -EM     [place] Level (Stairs) unable to assess (P)   -EM     Comment, (Gait/Stairs) Pt ambulated with step through gait pattern w/o use of AD. Able to ambulate 40 ft. Patient w/ decreased step length, slowed [place]. Cues provided for sequencing.   Distance ambulated limited by elevated BP concerns. No overt LOB or knee buckling throughout. Further gait limited by elevated BP and fatigue. (P)   -EM               User Key  (r) = Recorded By, (t) = Taken By, (c) = Cosigned By      Initials Name Provider Type    EM Uri Wells, PT Student PT Student                   Obj/Interventions       Row Name 05/24/24 1440          Range of Motion Comprehensive    General Range of Motion bilateral lower extremity ROM WNL (P)   -EM       Row Name 05/24/24 1440          Strength Comprehensive (MMT)    General Manual Muscle Testing (MMT) Assessment lower extremity strength deficits identified (P)   -EM     Comment, General Manual Muscle Testing (MMT) Assessment BLE MMT grossly (4/5) (P)   -EM       Row Name 05/24/24 1440          Balance    Balance Assessment sitting static balance;sitting dynamic balance;sit to stand dynamic balance;standing static balance;standing dynamic balance (P)   -EM     Static Sitting Balance standby assist;1-person assist (P)   -EM     Dynamic Sitting Balance standby assist;1-person assist (P)   -EM     Position, Sitting Balance unsupported;sitting edge of bed;sitting in chair (P)   -EM     Sit to Stand Dynamic Balance contact guard;1-person assist;verbal cues (P)   -EM     Static Standing Balance 1-person assist;contact guard (P)   -EM     Dynamic Standing Balance verbal cues;1-person assist;contact guard (P)   -EM     Position/Device Used, Standing Balance unsupported (P)   -EM     Balance Interventions sitting;standing;sit to stand;occupation  based/functional task;static;dynamic (P)   -EM       Row Name 05/24/24 1440          Sensory Assessment (Somatosensory)    Sensory Assessment (Somatosensory) LE sensation intact (P)   -EM               User Key  (r) = Recorded By, (t) = Taken By, (c) = Cosigned By      Initials Name Provider Type    VIOLETTA WellsUri, PT Student PT Student                   Goals/Plan       Row Name 05/24/24 1451          Bed Mobility Goal 1 (PT)    Activity/Assistive Device (Bed Mobility Goal 1, PT) sit to supine;supine to sit (P)   -EM     [place] Level/Cues Needed (Bed Mobility Goal 1, PT) independent (P)   -EM     Time Frame (Bed Mobility Goal 1, PT) long term goal (LTG);5 days (P)   -EM       Row Name 05/24/24 1451          Transfer Goal 1 (PT)    Activity/Assistive Device (Transfer Goal 1, PT) sit-to-stand/stand-to-sit;bed-to-chair/chair-to-bed (P)   -EM     [place] Level/Cues Needed (Transfer Goal 1, PT) independent (P)   -EM     Time Frame (Transfer Goal 1, PT) long term goal (LTG);5 days (P)   -EM       Row Name 05/24/24 1451          Gait Training Goal 1 (PT)    Activity/Assistive Device (Gait Training Goal 1, PT) gait (walking locomotion);decrease fall risk;increase endurance/gait distance;improve balance and speed (P)   -EM     [place] Level (Gait Training Goal 1, PT) standby assist (P)   -EM     Distance (Gait Training Goal 1, PT) 150 (P)   -EM     Time Frame (Gait Training Goal 1, PT) 2 days;short term goal (STG) (P)   -EM       Row Name 05/24/24 1451          Stairs Goal 1 (PT)    Activity/Assistive Device (Stairs Goal 1, PT) ascending stairs;descending stairs;decrease fall risk;improve balance and speed (P)   -EM     [place] Level/Cues Needed (Stairs Goal 1, PT) standby assist (P)   -EM     Number of Stairs (Stairs Goal 1, PT) 4 (P)   -EM     Time Frame (Stairs Goal 1, PT) long term goal (LTG);5 days (P)   -EM       Row Name 05/24/24 1451          Therapy Assessment/Plan (PT)    Planned Therapy  Interventions (PT) balance training;gait training;home exercise program;neuromuscular re-education;patient/family education;stair training;strengthening;stretching;transfer training;bed mobility training (P)   -EM               User Key  (r) = Recorded By, (t) = Taken By, (c) = Cosigned By      Initials Name Provider Type    EM Uri Wells, PT Student PT Student                   Clinical Impression       Row Name 05/24/24 1444          Pain    Pretreatment Pain Rating 4/10 (P)   -EM     Posttreatment Pain Rating 4/10 (P)   -EM     Pain Location - Side/Orientation Bilateral (P)   -EM     Pain Location generalized (P)   -EM     Pain Location - hand (P)   -EM     Pain Intervention(s) Repositioned;Ambulation/increased activity (P)   -EM       Row Name 05/24/24 1440          Plan of Care Review    Plan of Care Reviewed With patient (P)   -EM     Progress no change (P)   -EM     Outcome Evaluation PT initial eval completed. Pt presents below her functional baseline w/ decreased strength and impaired endurance. Pt ambulated 40' with CGA w/o AD. Activity limited by elevated BP. Patient on room air throughout session w/o c/o of SOB. No overt LOB or knee buckling. Further IPPT is warranted to address residual weakness and return to previous functional capacity. PT recommend d/c to home w/ assist and OPPT. (P)   -EM       Row Name 05/24/24 9231          Therapy Assessment/Plan (PT)    Patient/Family Therapy Goals Statement (PT) To get as strong as I can before I go home (P)   -EM     Rehab Potential (PT) good, to achieve stated therapy goals (P)   -EM     Criteria for Skilled Interventions Met (PT) yes;meets criteria;skilled treatment is necessary (P)   -EM     Therapy Frequency (PT) daily (P)   -EM     Predicted Duration of Therapy Intervention (PT) 5 days (P)   -EM       Row Name 05/24/24 1448          Vital Signs    Pre Systolic BP Rehab 164 (P)   -EM     Pre Treatment Diastolic BP 90 (P)   -EM     Intra Systolic BP  Rehab 172  (P)   sitting EOB  -EM     Intra Treatment Diastolic   (P)   -EM     Post Systolic BP Rehab 183  (P)   Post gait sitting in chair, RN aware  -EM     Post Treatment Diastolic BP 98  (P)   -EM     Pretreatment Heart Rate (beats/min) 97 (P)   -EM     Posttreatment Heart Rate (beats/min) 102 (P)   -EM     Pre SpO2 (%) 96 (P)   w/ 1L O2  -EM     O2 Delivery Pre Treatment nasal cannula (P)   -EM     Intra SpO2 (%) 94 (P)   -EM     O2 Delivery Intra Treatment room air (P)   -EM     Post SpO2 (%) 95 (P)   Room air  -EM     O2 Delivery Post Treatment room air (P)   -EM     Pre Patient Position Supine (P)   -EM     Intra Patient Position Standing (P)   -EM     Post Patient Position Sitting (P)   -EM       Row Name 05/24/24 1224          Positioning and Restraints    Pre-Treatment Position in bed (P)   -EM     Post Treatment Position chair (P)   -EM     In Chair reclined;notified nsg;sitting;call light within reach;encouraged to call for assist;exit alarm on;waffle cushion;legs elevated (P)   -EM               User Key  (r) = Recorded By, (t) = Taken By, (c) = Cosigned By      Initials Name Provider Type    EM Uri Wells, PT Student PT Student                   Outcome Measures       Row Name 05/24/24 5967          How much help from another person do you currently need...    Turning from your back to your side while in flat bed without using bedrails? 4 (P)   -EM     Moving from lying on back to sitting on the side of a flat bed without bedrails? 3 (P)   -EM     Moving to and from a bed to a chair (including a wheelchair)? 3 (P)   -EM     Standing up from a chair using your arms (e.g., wheelchair, bedside chair)? 3 (P)   -EM     Climbing 3-5 steps with a railing? 3 (P)   -EM     To walk in hospital room? 3 (P)   -EM     AM-PAC 6 Clicks Score (PT) 19 (P)   -EM     Highest Level of Mobility Goal 6 --> Walk 10 steps or more (P)   -EM       Row Name 05/24/24 3569          Functional Assessment    Outcome  Measure Options AM-PAC 6 Clicks Basic Mobility (PT) (P)   -EM               User Key  (r) = Recorded By, (t) = Taken By, (c) = Cosigned By      Initials Name Provider Type    EM Uri Wells, PT Student PT Student                                 Physical Therapy Education       Title: PT OT SLP Therapies (In Progress)       Topic: Physical Therapy (In Progress)       Point: Mobility training (Done)       Learning Progress Summary             Patient Acceptance, E,D, VU,DU by EM at 5/24/2024 1457                         Point: Home exercise program (Not Started)       Learner Progress:  Not documented in this visit.              Point: Body mechanics (Done)       Learning Progress Summary             Patient Acceptance, E,D, VU,DU by EM at 5/24/2024 1457                         Point: Precautions (Done)       Learning Progress Summary             Patient Acceptance, E,D, VU,DU by EM at 5/24/2024 1457                                         User Key       Initials Effective Dates Name Provider Type Discipline     05/07/24 -  Uri Wells, PT Student PT Student PT                  PT Recommendation and Plan  Planned Therapy Interventions (PT): (P) balance training, gait training, home exercise program, neuromuscular re-education, patient/family education, stair training, strengthening, stretching, transfer training, bed mobility training  Plan of Care Reviewed With: (P) patient  Progress: (P) no change  Outcome Evaluation: (P) PT initial eval completed. Pt presents below her functional baseline w/ decreased strength and impaired endurance. Pt ambulated 40' with CGA w/o AD. Activity limited by elevated BP. Patient on room air throughout session w/o c/o of SOB. No overt LOB or knee buckling. Further IPPT is warranted to address residual weakness and return to previous functional capacity. PT recommend d/c to home w/ assist and OPPT.     Time Calculation:   PT Evaluation Complexity  History, PT Evaluation  Complexity: (P) 1-2 personal factors and/or comorbidities  Examination of Body Systems (PT Eval Complexity): (P) 1-2 elements  Clinical Presentation (PT Evaluation Complexity): (P) stable  Clinical Decision Making (PT Evaluation Complexity): (P) low complexity  Overall Complexity (PT Evaluation Complexity): (P) low complexity     PT Charges       Row Name 05/24/24 1500 05/24/24 1458          Time Calculation    Start Time -- 1353 (P)   -EM     PT Received On -- 05/24/24 (P)   -EM     PT Goal Re-Cert Due Date -- 06/03/24 (P)   -EM        Timed Charges    06975 - Gait Training Minutes  -- -- (P)   -EM     52492 - PT Therapeutic Activity Minutes -- -- (P)   -EM        Untimed Charges    PT Eval/Re-eval Minutes 50 (P)   -EM 51 (P)   -EM        Total Minutes    Timed Charges Total Minutes -- -- (P)   -EM     Untimed Charges Total Minutes 50 (P)   -EM 51 (P)   -EM      Total Minutes 50 (P)   -EM 51 (P)   -EM               User Key  (r) = Recorded By, (t) = Taken By, (c) = Cosigned By      Initials Name Provider Type    EM Uri Wells, PT Student PT Student                  Therapy Charges for Today       Code Description Service Date Service Provider Modifiers Qty    05119536098  PT EVAL LOW COMPLEXITY 4 5/24/2024 Uri Wells, PT Student GP 1            PT G-Codes  Outcome Measure Options: (P) AM-PAC 6 Clicks Basic Mobility (PT)  AM-PAC 6 Clicks Score (PT): (P) 19  PT Discharge Summary  Anticipated Discharge Disposition (PT): (P) home with outpatient therapy services, home with assist    Uri Wells, PT Student  5/24/2024

## 2024-05-24 NOTE — PLAN OF CARE
Goal Outcome Evaluation:   Patient alert and oriented x4. Normal sinus rhythm to sinus tachycardia. 2-3L nasal cannula. Patient reported chest pressure 5/10 upon initial assessment but felt it was anxiety, MD stat notified, EKG obtained. Reports of severe pain throughout shift, prn's given as ordered/patient requested. Multiple conversations with MD regarding pain. Family at bedside intermittently throughout shift. Still waiting for MRI to be performed, screening and consent faxed to MRI. Bowel regimen administered.

## 2024-05-24 NOTE — PROGRESS NOTES
"Brief visit made to verify info as patient \"not feeling well currently\". She is agreeable to [facility]. PCP is Julissa EDWARDS and message left regarding following for home care. Shin CORREIA([facility])Hospital Liaison  "

## 2024-05-24 NOTE — TELEPHONE ENCOUNTER
ANNIE FROM [facility] IS CALLING WANTING TO CONFIRM THAT [facility] WILL FOLLOW PATIENTS HOME CARE, PATIENT IS BEING DISCHARGED FROM THE HOSPITAL.  PHONE #  [phone]

## 2024-05-24 NOTE — PROGRESS NOTES
[facility] Medicine Services  PROGRESS NOTE    Patient Name: Linda Good  : 1955  MRN: [id number]    Date of Admission: 2024  Primary Care Physician: Julissa Vernon PA-C    Subjective   Subjective     CC:  Fever    HPI:  Patient denies shortness of breath or chest pain.  MRI brain within normal limits.  She reports improvement in hand pain with meloxicam.  White blood cell count trending down.  Did desat with ambulation however was able to come off of oxygen      Objective   Objective     Vital Signs:   Temp:  [98 °F (36.7 °C)-98.3 °F (36.8 °C)] 98.1 °F (36.7 °C)  Heart Rate:  [87-97] 95  Resp:  [16-18] 18  BP: (144-185)/() 185/105  Flow (L/min):  [2-4.5] 4     Physical Exam:  Constitutional: No acute distress, awake, alert  HENT: NCAT, mucous membranes moist  Respiratory: Clear to auscultation bilaterally, respiratory effort normal   Cardiovascular: RRR, no murmurs, rubs, or gallops  Gastrointestinal: Positive bowel sounds, soft, nontender, nondistended  Musculoskeletal: No bilateral ankle edema  Psychiatric: Cooperative yet anxious.  Neurologic: Oriented x 3, speech clear  Skin: Improvement of erythema over bilateral second metacarpal joint.    Results Reviewed:  LAB RESULTS:      Lab 24  0805 24  0500 24  0432 24  0528 24  1350   WBC 10.99* 12.15* 11.76* 10.00 13.99*   HEMOGLOBIN 11.6* 10.8* 10.5* 10.8* 12.2   HEMATOCRIT 35.8 33.1* 32.3* 33.3* 37.9   PLATELETS 566* 526* 525* 501* 609*   NEUTROS ABS 6.84 9.19* 9.17* 8.80* 12.03*   IMMATURE GRANS (ABS) 0.44* 0.17*  --   --   --    LYMPHS ABS 2.30 1.80  --   --   --    MONOS ABS 0.93* 0.79  --   --   --    EOS ABS 0.37 0.14 0.35 0.00 0.00   MCV 97.8* 96.8 97.9* 98.5* 99.0*   SED RATE  --   --   --  57*  --    CRP  --   --   --  18.01*  --    PROCALCITONIN  --   --   --   --  1.89*   LACTATE  --   --   --   --  1.8         Lab 24  0805 24  [facility] 24  0500  05/22/24  0432 05/21/24  0528 05/20/24  1350   SODIUM 141 139 137 138 140 132*   POTASSIUM 3.9 3.5 3.6 4.1 4.1 4.2   CHLORIDE 103 105 101 105 107 92*   CO2 28.0 26.0 24.0 22.0 23.0 22.0   ANION GAP 10.0 8.0 12.0 11.0 10.0 18.0*   BUN 8 7* 8 9 24* 36*   CREATININE 0.54* 0.56* 0.48* 0.58 1.22* 2.00*   EGFR 101.1 100.2 104.0 99.3 48.7* 26.9*   GLUCOSE 80 118* 79 85 96 118*   CALCIUM 8.6 8.4* 7.8* 8.1* 8.3* 9.2   MAGNESIUM  --   --   --   --   --  2.1   TSH  --   --   --   --   --  2.020         Lab 05/22/24  0432 05/21/24  0528 05/20/24  1350   TOTAL PROTEIN 5.4* 5.5* 7.4   ALBUMIN 3.3* 3.4* 3.9   GLOBULIN 2.1 2.1 3.5   ALT (SGPT) 24 34* 50*   AST (SGOT) 16 19 26   BILIRUBIN <0.2 <0.2 0.2   ALK PHOS 86 92 115                 Lab 05/21/24  0528   FERRITIN 469.80*         Brief Urine Lab Results  (Last result in the past 365 days)        Color   Clarity   Blood   Leuk Est   Nitrite   Protein   CREAT   Urine HCG        05/20/24 1441 Dark Yellow   Turbid   Negative   Trace   Negative   30 mg/dL (1+)                   Cultures:  Blood Culture   Date Value Ref Range Status   05/20/2024 No growth at 2 days  Preliminary   05/20/2024 No growth at 2 days  Preliminary     Urine Culture   Date Value Ref Range Status   05/20/2024 No growth  Final       Microbiology Results Abnormal       Procedure Component Value - Date/Time    Blood Culture - Blood, Arm, Left [[id number]]  (Normal) Collected: 05/20/24 1350    Lab Status: Preliminary result Specimen: Blood from Arm, Left Updated: 05/24/24 1415     Blood Culture No growth at 4 days    Blood Culture - Blood, Arm, Right [[id number]]  (Normal) Collected: 05/20/24 1438    Lab Status: Preliminary result Specimen: Blood from Arm, Right Updated: 05/23/24 1515     Blood Culture No growth at 3 days    Culture, CSF - Cerebrospinal Fluid, Lumbar Puncture [[id number]] Collected: 05/20/24 1830    Lab Status: Final result Specimen: Cerebrospinal Fluid from Lumbar Puncture Updated: 05/23/24 0747      CSF Culture No growth at 3 days     Gram Stain Moderate (3+) Red blood cells      Occasional WBCs seen      No organisms seen    S. Pneumo Ag Urine or CSF - Urine, Urine, Clean Catch [[id number]]  (Normal) Collected: 05/22/24 1706    Lab Status: Final result Specimen: Urine, Clean Catch Updated: 05/23/24 0006     Strep Pneumo Ag Negative    Legionella Antigen, Urine - Urine, Urine, Clean Catch [[id number]]  (Normal) Collected: 05/22/24 1706    Lab Status: Final result Specimen: Urine, Clean Catch Updated: 05/23/24 0006     LEGIONELLA ANTIGEN, URINE Negative    MRSA Screen, PCR (Inpatient) - Swab, Nares [[id number]]  (Normal) Collected: 05/22/24 1436    Lab Status: Final result Specimen: Swab from Nares Updated: 05/22/24 1618     MRSA PCR Negative    Narrative:      The negative predictive value of this diagnostic test is high and should only be used to consider de-escalating anti-MRSA therapy. A positive result may indicate colonization with MRSA and must be correlated clinically.  MRSA Negative    Respiratory Panel PCR w/COVID-19(SARS-CoV-2) [facility]KEY/YENI/[facility]A/PAD/COR/LEONA In-House, NP Swab in UTM/VTM, 2 HR TAT - Swab, Nasopharynx [[id number]]  (Normal) Collected: 05/22/24 1436    Lab Status: Final result Specimen: Swab from Nasopharynx Updated: 05/22/24 1556     ADENOVIRUS, PCR Not Detected     Coronavirus 229E Not Detected     Coronavirus HKU1 Not Detected     Coronavirus NL63 Not Detected     Coronavirus OC43 Not Detected     COVID19 Not Detected     Human Metapneumovirus Not Detected     Human Rhinovirus/Enterovirus Not Detected     Influenza A PCR Not Detected     Influenza B PCR Not Detected     Parainfluenza Virus 1 Not Detected     Parainfluenza Virus 2 Not Detected     Parainfluenza Virus 3 Not Detected     Parainfluenza Virus 4 Not Detected     RSV, PCR Not Detected     Bordetella pertussis pcr Not Detected     Bordetella parapertussis PCR Not Detected     Chlamydophila pneumoniae PCR Not Detected     Mycoplasma  pneumo by PCR Not Detected    Narrative:      In the setting of a positive respiratory panel with a viral infection PLUS a negative procalcitonin without other underlying concern for bacterial infection, consider observing off antibiotics or discontinuation of antibiotics and continue supportive care. If the respiratory panel is positive for atypical bacterial infection (Bordetella pertussis, Chlamydophila pneumoniae, or Mycoplasma pneumoniae), consider antibiotic de-escalation to target atypical bacterial infection.    Urine Culture - Urine, Urine, Clean Catch [[id number]]  (Normal) Collected: 05/20/24 1441    Lab Status: Final result Specimen: Urine, Clean Catch Updated: 05/22/24 0921     Urine Culture No growth    Meningitis / Encephalitis Panel, PCR - Cerebrospinal Fluid, Lumbar Puncture [[id number]]  (Normal) Collected: 05/20/24 1830    Lab Status: Final result Specimen: Cerebrospinal Fluid from Lumbar Puncture Updated: 05/20/24 2115     ESCHERICHIA COLI K1, PCR Not Detected     HAEMOPHILUS INFLUENZAE, PCR Not Detected     LISTERIA MONOCYTOGENES, PCR Not Detected     NEISSERIA MENINGITIDIS, PCR Not Detected     STREPTOCOCCUS AGALACTIAE, PCR Not Detected     STREPTOCOCCUS PNEUMONIAE, PCR Not Detected     CYTOMEGALOVIRUS (CMV), PCR Not Detected     ENTEROVIRUS, PCR Not Detected     HERPES SIMPLEX VIRUS 1 (HSV-1), PCR Not Detected     HERPES SIMPLEX VIRUS 2 (HSV-2), PCR Not Detected     HUMAN PARECHOVIRUS, PCR Not Detected     VARICELLA ZOSTER VIRUS (VZV), PCR Not Detected     CRYPTOCOCCUS NEOFORMANS / GATTII, PCR Not Detected     HUMAN HERPES VIRUS 6 PCR Not Detected    Respiratory Panel PCR w/COVID-19(SARS-CoV-2) [facility]KEY/YENI/[facility]A/PAD/COR/LEONA In-House, NP Swab in UTM/VTM, 2 HR TAT - Swab, Nasopharynx [[id number]]  (Normal) Collected: 05/20/24 1434    Lab Status: Final result Specimen: Swab from Nasopharynx Updated: 05/20/24 1546     ADENOVIRUS, PCR Not Detected     Coronavirus 229E Not Detected     Coronavirus HKU1 Not  Detected     Coronavirus NL63 Not Detected     Coronavirus OC43 Not Detected     COVID19 Not Detected     Human Metapneumovirus Not Detected     Human Rhinovirus/Enterovirus Not Detected     Influenza A PCR Not Detected     Influenza B PCR Not Detected     Parainfluenza Virus 1 Not Detected     Parainfluenza Virus 2 Not Detected     Parainfluenza Virus 3 Not Detected     Parainfluenza Virus 4 Not Detected     RSV, PCR Not Detected     Bordetella pertussis pcr Not Detected     Bordetella parapertussis PCR Not Detected     Chlamydophila pneumoniae PCR Not Detected     Mycoplasma pneumo by PCR Not Detected    Narrative:      In the setting of a positive respiratory panel with a viral infection PLUS a negative procalcitonin without other underlying concern for bacterial infection, consider observing off antibiotics or discontinuation of antibiotics and continue supportive care. If the respiratory panel is positive for atypical bacterial infection (Bordetella pertussis, Chlamydophila pneumoniae, or Mycoplasma pneumoniae), consider antibiotic de-escalation to target atypical bacterial infection.            XR Chest 1 View    Result Date: 5/24/2024  XR CHEST 1 VW Date of Exam: 5/24/2024 1:04 AM EDT Indication: DYSPNEA, CARDIAC ORIGIN SUSPECTED increasing oxygen requirement Comparison: May 20, 2024 Findings: The heart is enlarged. There are diffuse alveolar interstitial densities which have developed in the interval favored represent pulmonary edema. Bilateral basilar infiltrates greater in the left lung base suggest pneumonia. There is no pneumothorax. The osseous structures are normal.     Impression: Impression: Cardiomegaly and pulmonary vascular congestion. Bilateral basilar airspace disease greatest in the left lung base is felt to be secondary to pneumonia. Electronically Signed: Dipesh Borjas MD  5/24/2024 1:28 AM EDT  Workstation ID: [id number]    MRI Brain With & Without Contrast    Result Date: 5/23/2024  MRI BRAIN  W WO CONTRAST Date of Exam: 5/23/2024 8:19 PM EDT Indication: persistent headaches.  Comparison: 5/20/2024 head CT scan Technique:  Routine multiplanar/multisequence sequence images of the brain were obtained before and after the uneventful administration of 10 mL Multihance. Findings: Previous head CT scan report noted no acute intracranial abnormality. Additional history from the patient's chart indicates fever, myalgias, night sweats headaches and fatigue. No restricted diffusion is seen to suggest acute infarction. 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 There is no evidence of mass, mass effect or edema, no evidence of hydrocephalus or abnormal extra-axial collection. No evidence of previous infarct is seen. There are no signal changes suggestive of hemorrhage. There is expected flow signal in the major intracranial arteries and median sagittal sinus. Sagittal images show the midline structures to appear within normal limits. Included paranasal sinuses and mastoids appear clear except for trace ethmoid mucosal thickening. No gross abnormalities are seen  in the orbits. Postcontrast images show no evidence of enhancing mass or other pathologic postcontrast brain or meningeal enhancement. There is normal contrast opacification of the major dural venous sinuses, with no visible filling defects.     Impression: Impression: Expected, chronic appearing changes of the aging brain. No evidence of acute intracranial disease is seen. Electronically Signed: Bora Sims MD  5/23/2024 10:00 PM EDT  Workstation ID: [id number]     Results for orders placed during the hospital encounter of 05/20/24    Adult Transthoracic Echo Complete W/ Cont if Necessary Per Protocol    Interpretation Summary    Left ventricular systolic function is normal. Left ventricular ejection fraction appears to  be 61 - 65%.    Mild mitral valve regurgitation is present.    Mild tricuspid valve regurgitation is present.    Estimated right ventricular systolic pressure from tricuspid regurgitation is normal (<35 mmHg).      Current medications:  Scheduled Meds:amLODIPine, 5 mg, Oral, Q24H  cefTRIAXone, 2,000 mg, Intravenous, Q24H  doxycycline, 100 mg, Oral, Q12H  heparin (porcine), 5,000 Units, Subcutaneous, Q8H  Lidocaine, 1 patch, Transdermal, Q24H  meloxicam, 7.5 mg, Oral, Daily  pantoprazole, 40 mg, Oral, Q AM  rosuvastatin, 5 mg, Oral, Nightly  sodium chloride, 10 mL, Intravenous, Q12H  vitamin B-1, 50 mg, Oral, Daily      Continuous Infusions:sodium chloride, 50 mL/hr, Last Rate: 50 mL/hr (05/23/24 1948)      PRN Meds:.  acetaminophen    senna-docusate sodium **AND** polyethylene glycol **AND** bisacodyl **AND** bisacodyl    diphenhydrAMINE **AND** ketorolac    hydrOXYzine    Morphine    naloxone    ondansetron ODT **OR** ondansetron    oxyCODONE    prochlorperazine    sodium chloride    sodium chloride    temazepam    Assessment & Plan   Assessment & Plan     Active Hospital Problems    Diagnosis  POA    **Fever [R50.9]  Yes    Sepsis [A41.9]  Yes    BLADE (acute kidney injury) [N17.9]  Yes    Bandemia [D72.825]  Yes    Primary hypertension [I10]  Yes      Resolved Hospital Problems   No resolved problems to display.        Brief Hospital Course to date:  Linda Good is a 69 y.o. female  with h/o HTN and HLD who reported fever up to 101.9 and off/on generalized headache, myalgias, night sweats, fatigue, and loss of appetite x about 12 days prior to presentation.         Sepsis with Bandemia, tachycardia  Multifocal pneumonia  Headache  --Infectious versus rheumatologic issue; does have chronic upper chest erythematous rash; on exam also has new erythema on bilateral wrists and right second MCP joint  --s/p LP with negative meningitis panel; appeared to have traumatic LP; not consistent with CNS infection  --No  evidence of joint infection, TTE with no evidence of endocarditis  --s/p recent macrobid and then cefdinir for presumed UTI  --s/p vanc, rocephin, and ampicillin in ER.   --negative for COVID and flu.    --Respiratory panel negative on first testing; ID recommended repeat full respiratory panel (ordered)  --follow blood cultures  --no hardware, [place]  --consulted ID, discussed with Dr. Man Mandel on 5/22  --Ferritin mildly elevated 469, CK1 20, ESR elevated 57, CRP elevated 18  --Follow-up urine histo antigen  --follow-up ANCA  -- CT imaging obtained on 5/22 consistent with multifocal pneumonia; CT abdomen/pelvis with contrast unrevealing  -- Continue ceftriaxone, doxycycline added on  -- Legionella antigen negative, strep pneumo antigen negative.  MRSA screen negative     Pain, swelling, erythema of dorsal hands  --Not consistent with cellulitis; may represent reactive arthritis  --As needed pain control  --05/23: Spoke with Dr. Mandel on 05/23 can start Meloxicam and monitor renal function as high suspicion for reactive arthritis      Bilateral shoulder pain  --X-rays unrevealing     BLADE, resolved  --Baseline creatinine around 0.75; creatinine 2 on admission, normalized with IV fluids  --05/23 Lisinopril was restarted however will transition to amlodipine given trial of Meloxicam. Hold HCTZ     HTN  HLD  --continue statin     Anxiety, situational   -Atarax 10 mg TID prn     Expected Discharge Location and Transportation: home with [facility]   Expected Discharge 05/25/2024  Expected Discharge Date: 5/24/2024; Expected Discharge Time:      DVT prophylaxis:  Medical DVT prophylaxis orders are present.         AM-PAC 6 Clicks Score (PT): (P) 18 (05/24/24 0970)    CODE STATUS:   Code Status and Medical Interventions:   Ordered at: 05/20/24 4670     Level Of Support Discussed With:    Patient     Code Status (Patient has no pulse and is not breathing):    CPR (Attempt to  Resuscitate)     Medical Interventions (Patient has pulse or is breathing):    Full Support       Sweta Gracia MD  05/24/24

## 2024-05-24 NOTE — PROGRESS NOTES
INFECTIOUS DISEASE Progress note    Linda Good  1955  [id number]    Date of consult: 5/21/24    Admit date: 5/20/2024    Requesting Provider: Dr. Greenberg  Evaluating physician: Man Mandel MD  Reason for Consultation: RONNY  Chief Complaint: fever, headache      Subjective   History of present illness:  Linda Good is a  69 y.o.  Yr old female with a pmh of htn and hld who presented to the hospital yesterday evening with complaints of fevers up to 101.9F and intermittent headaches for about 12 days. She was given Macrobid at an urgent treatment center about 10 Days ago for possible UTI and that was changed to cefdinir about 7 days ago.  Symptoms have not improved and she continues to have fevers, night sweats, myalgias, headaches, fatigue, and loss of appetite.  [place] but has not noticed any tick bites or mosquito bites. She does state that she found ticks on herself back in the spring but not recently.  She does garden a lot.  She also bird watches and is in close proximity to birds and feeds them. She has a pet cat but denies any scratches or bites. No recent travel.  No known sick contacts.  She is  and lives with her  and son.    Since arrival, has remained afebrile with a T-max of 98.9F.  She has been slightly tachycardic in the low 100s.  SPO2 is in the mid 90s on room air. White blood cell count was initially 13.99 but has trended down to 10.0. She did have 56% bands today. Platelet count has been elevated to 609-->  501.  Hemoglobin is 10.8 today. Creatinine was initially elevated to 2.0 but is trended down to 1.22.  Baseline is around 0.7-0.8. Lumbar puncture was done with 29 WBCs, 20,000 RBCs.  She had 66% neutrophils and 23% lymphocytes.  CSF cultures no growth to date.  CSF glucose was 64 and CSF protein was 95.3.  Meningitis/encephalitis panel was negative.  Urinalysis showed trace leukocytes esterase, Negative nitrite, 21-50 WBCs,  3-5 RBCs, and 2+ bacteria.  Urine culture is in progress.  Blood cultures are in progress.  Respiratory PCR panel was negative.  Pro-calcitonin was initially elevated 1.89.  Lactic acid was 1.8.  Monospot test was negative. KAYLA panel has been sent and is pending. Chest x-ray was negative for acute cardio pulmonary mallet.  CT head was negative for intracranial abnormality. The patient was initially on ampicillin but this has been stopped.  She is on ceftriaxone 2 g IV every 12 hours and vancomycin.  ID has been consulted for antibiotic recommendations.    Subjective:    5/22/24: The patient had some worsening shortness of breath of the last 24 hours and did have some mild hypoxia and is now on 2 L nasal cannula supplemental O2.  She is afebrile still.  White blood cell count is slightly higher today but bandemia was slightly better. CT chest with signs of multifocal pneumonia with small bilateral parapneumonic effusions.  CT abdomen and pelvis was done and was without any acute changes.  Blood cultures remain no growth to date and urine culture was no growth. She is still having some severe diffuse headaches.  Also having some bilateral shoulder pain that is ongoing.    5/23/24: The patient's shoulder pain is somewhat better today.  She does have some new erythematous nodular regions over multiple fingers bilaterally.  These are tender.  Ongoing shortness of breath and hypoxia but stable from yesterday and she feels like her shortness of breath may be somewhat better.  Still having ongoing headaches.  MRI brain has been ordered and is pending.  She is not having any fevers.  White blood cell count is slightly worse today at 12.15.    5/24/24: The patient is still having some shoulder pain although slightly better.  The painful erythematous nodules over her fingers are resolving since starting mobic yesterday.  No new rashes or skin lesions reported.  Her headaches are better today.  Breathing has improved  significantly and she is now breathing well on room air.  No productive cough.  No nausea, vomiting, or diarrhea.  No fevers. White blood cell count has improved to 10.99 today.  Creatinine is stable at 0.54.  Rheumatoid factor was normal at 12.9.    Past Medical History:   Diagnosis Date    Diabetes mellitus 3/1/1993    Gestational    Hyperlipidemia     Hypertension     Pregnancy     1993,1982,1980,1978       Past Surgical History:   Procedure Laterality Date    BREAST SURGERY  1997    Lumpectomy/Benign    COLONOSCOPY      INTERVENTIONAL RADIOLOGY PROCEDURE N/A 5/20/2024    Procedure: IR fluoroscopy guided lumbar puncture diagnostic;  Surgeon: Epi Blackburn MD;  Location: [facility] INVASIVE LOCATION;  Service: Interventional Radiology;  Laterality: N/A;    TUBAL ABDOMINAL LIGATION  1993       Pediatric History   Patient Parents    Not on file     Other Topics Concern    Not on file   Social History Narrative    Not on file   [place] but has not noticed any tick bites or mosquito bites. She does state that she found ticks on herself back in the spring but not recently.  She does garden a lot.  She also bird watches and is in close proximity to birds and feeds them. She has a pet cat but denies any scratches or bites. No recent travel.  No known sick contacts.  She is  and lives with her  and son.    family history includes Cancer in her father; Coronary artery disease in her sister; Diabetes in her mother; Heart disease in her mother; Lung cancer in her father; Other in her mother.    No Known Allergies    Immunization History   Administered Date(s) Administered    COVID-19 (MODERNA) 1st,2nd,3rd Dose Monovalent 03/03/2021, 03/31/2021    COVID-19 (MODERNA) BIVALENT 12+YRS 10/14/2022    COVID-19 (MODERNA) Monovalent Original Booster 11/01/2021, 06/05/2022    COVID-19 F23 (MODERNA) 12YRS+ (SPIKEVAX) 11/30/2023    Fluzone High-Dose 65+yrs 10/14/2022, 11/30/2023     Hepatitis B Adult/Adolescent IM 01/28/2004, 02/25/2004, 05/21/2004    Influenza, Unspecified 10/17/2019    Pneumococcal Conjugate 20-Valent (PCV20) 07/28/2023    Tdap 04/11/2021       Medication:    Current Facility-Administered Medications:     acetaminophen (TYLENOL) tablet 650 mg, 650 mg, Oral, Q4H PRN, Sweta Gracia MD, 650 mg at 05/24/24 0126    amLODIPine (NORVASC) tablet 5 mg, 5 mg, Oral, Q24H, Sweta Gracia MD    sennosides-docusate (PERICOLACE) 8.6-50 MG per tablet 2 tablet, 2 tablet, Oral, BID PRN **AND** polyethylene glycol (MIRALAX) packet 17 g, 17 g, Oral, Daily PRN, 17 g at 05/23/24 1251 **AND** bisacodyl (DULCOLAX) EC tablet 5 mg, 5 mg, Oral, Daily PRN **AND** bisacodyl (DULCOLAX) suppository 10 mg, 10 mg, Rectal, Daily PRN, Jass Greenberg MD    cefTRIAXone (ROCEPHIN) 2,000 mg in sodium chloride 0.9 % 100 mL MBP, 2,000 mg, Intravenous, Q24H, Man Mandel MD, Last Rate: 200 mL/hr at 05/24/24 0921, 2,000 mg at 05/24/24 0921    doxycycline (MONODOX) capsule 100 mg, 100 mg, Oral, Q12H, Rosey Mora MD, 100 mg at 05/24/24 0919    heparin (porcine) 5000 UNIT/ML injection 5,000 Units, 5,000 Units, Subcutaneous, Q8H, Jass Greenberg MD, 5,000 Units at 05/24/24 0559    hydrOXYzine (ATARAX) tablet 10 mg, 10 mg, Oral, TID PRN, Sweta Gracia MD, 10 mg at 05/23/24 2202    Lidocaine 4 % 1 patch, 1 patch, Transdermal, Q24H, Melissa Payne, [facility], 1 patch at 05/22/24 1731    meloxicam (MOBIC) tablet 7.5 mg, 7.5 mg, Oral, Daily, Sweta Gracia MD, 7.5 mg at 05/24/24 0919    morphine injection 2 mg, 2 mg, Intravenous, Q4H PRN, Rosey Mora MD, 2 mg at 05/23/24 1757    naloxone (NARCAN) injection 0.4 mg, 0.4 mg, Intravenous, Q5 Min PRN, Rosey Mora MD    ondansetron ODT (ZOFRAN-ODT) disintegrating tablet 4 mg, 4 mg, Oral, Q6H PRN, 4 mg at 05/22/24 7516 **OR** ondansetron (ZOFRAN) injection 4 mg, 4 mg, Intravenous, Q6H PRN, Jass Greenberg MD, 4 mg at 05/22/24 2134    oxyCODONE (ROXICODONE)  "immediate release tablet 5 mg, 5 mg, Oral, Q6H PRN, Rosey Mora MD, 5 mg at 05/24/24 0558    pantoprazole (PROTONIX) EC tablet 40 mg, 40 mg, Oral, Q AM, Sweta Gracia MD, 40 mg at 05/24/24 0558    rosuvastatin (CRESTOR) tablet 5 mg, 5 mg, Oral, Nightly, Jass Greenberg MD, 5 mg at 05/23/24 2156    sodium chloride 0.9 % flush 10 mL, 10 mL, Intravenous, Q12H, Jass Greenberg MD, 10 mL at 05/24/24 0922    sodium chloride 0.9 % flush 10 mL, 10 mL, Intravenous, PRN, Jass Greenberg MD    sodium chloride 0.9 % infusion 40 mL, 40 mL, Intravenous, PRN, Jass Greenberg MD    sodium chloride 0.9 % infusion, 50 mL/hr, Intravenous, Continuous, Sweta Gracia MD, Last Rate: 50 mL/hr at 05/23/24 1948, 50 mL/hr at 05/23/24 1948    temazepam (RESTORIL) capsule 15 mg, 15 mg, Oral, Nightly PRN, Jass Greenberg MD, 15 mg at 05/23/24 2202    thiamine (VITAMIN B-1) tablet 50 mg, 50 mg, Oral, Daily, Jass Greenberg MD, 50 mg at 05/24/24 0919    Please refer to the medical record for a full medication list    Review of Systems:    As above    Physical Exam:   Vital Signs   Temp:  [98 °F (36.7 °C)-98.3 °F (36.8 °C)] 98.1 °F (36.7 °C)  Heart Rate:  [87-99] 92  Resp:  [16-18] 18  BP: (144-169)/(81-92) 164/90    Temp  Min: 98 °F (36.7 °C)  Max: 98.3 °F (36.8 °C)  BP  Min: 144/83  Max: 169/81  Pulse  Min: 87  Max: 99  Resp  Min: 16  Max: 18  SpO2  Min: 80 %  Max: 96 %    Blood pressure 164/90, pulse 92, temperature 98.1 °F (36.7 °C), temperature source Oral, resp. rate 18, height 160 cm (62.99\"), weight 53 kg (116 lb 13.5 oz), SpO2 93%.  GENERAL: Awake and alert, in no acute distress. Resting comfortable in bed  HEENT:  Normocephalic, atraumatic.  No external oral lesions noted.  No thrush noted.  EYES:  No conjunctival injection. No icterus.   HEART: RRR, no murmur. No peripheral edema  LUNGS: Clear to auscultation bilaterally.  Nonlabored breathing on room-air  ABDOMEN: Soft, nontender, nondistended. No appreciable HSM.  " "  GENITAL: no Rodriguez catheter  MSK: No joint effusions noted. Erythematous nodules over her fingers are resolving.  SKIN: No new generalized rashes noted  PSYCHIATRIC: cooperative.  Appropriate mood and affect  NEURO: awake alert and oriented ×4.  Normal speech and cognition    Results Review:   I reviewed the patient's new clinical results.  I reviewed the patient's new imaging results and agree with the interpretation.  I reviewed the patient's other test results and agree with the interpretation    Results from last 7 days   Lab Units 05/24/24  0805 05/23/24  0500 05/22/24  0432   WBC 10*3/mm3 10.99* 12.15* 11.76*   HEMOGLOBIN g/dL 11.6* 10.8* 10.5*   HEMATOCRIT % 35.8 33.1* 32.3*   PLATELETS 10*3/mm3 566* 526* 525*     Results from last 7 days   Lab Units 05/24/24  0805   SODIUM mmol/L 141   POTASSIUM mmol/L 3.9   CHLORIDE mmol/L 103   CO2 mmol/L 28.0   BUN mg/dL 8   CREATININE mg/dL 0.54*   GLUCOSE mg/dL 80   CALCIUM mg/dL 8.6     Results from last 7 days   Lab Units 05/22/24  0432   ALK PHOS U/L 86   BILIRUBIN mg/dL <0.2   ALT (SGPT) U/L 24   AST (SGOT) U/L 16     Results from last 7 days   Lab Units 05/21/24  0528   SED RATE mm/hr 57*     Results from last 7 days   Lab Units 05/21/24  0528   CRP mg/dL 18.01*     Results from last 7 days   Lab Units 05/21/24  0528   VANCOMYCIN RM mcg/mL 12.10     Results from last 7 days   Lab Units 05/20/24  1350   LACTATE mmol/L 1.8     Estimated Creatinine Clearance: 84.6 mL/min (A) (by C-G formula based on SCr of 0.54 mg/dL (L)).  CPK          5/21/2024    05:28   Common Labs   Creatine Kinase 120       Procalitonin Results:      Lab 05/20/24  1350   PROCALCITONIN 1.89*      Brief Urine Lab Results  (Last result in the past 365 days)        Color   Clarity   Blood   Leuk Est   Nitrite   Protein   CREAT   Urine HCG        05/20/24 1441 Dark Yellow   Turbid   Negative   Trace   Negative   30 mg/dL (1+)                  No results found for: \"SITE\", \"ALLENTEST\", \"PHART\", " "\"ORV8RYF\", \"PO2ART\", \"FAF8HTF\", \"BASEEXCESS\", \"D4OUZJQY\", \"HGBBG\", \"HCTABG\", \"OXYHEMOGLOBI\", \"METHHGBN\", \"CARBOXYHGB\", \"CO2CT\", \"BAROMETRIC\", \"MODALITY\", \"FIO2\"     Microbiology:  No results found for: \"ACANTHNAEG\", \"AFBCX\", \"BPERTUSSISCX\", \"BLOODCX\"  CSF Culture   Date Value Ref Range Status   05/20/2024 No growth  Preliminary     Urine Culture   Date Value Ref Range Status   05/14/2024 Final report  Final     No results found for: \"VIRALCULTU\", \"WOUNDCX\"     Urine Culture   Date Value Ref Range Status   05/14/2024 Final report  Final   (      Radiology:  Imaging Results (Last 72 Hours)       Procedure Component Value Units Date/Time    XR Chest 1 View [[id number]] Collected: 05/24/24 0127     Updated: 05/24/24 0131    Narrative:      XR CHEST 1 VW    Date of Exam: 5/24/2024 1:04 AM EDT    Indication: DYSPNEA, CARDIAC ORIGIN SUSPECTED  increasing oxygen requirement    Comparison: May 20, 2024    Findings:  The heart is enlarged. There are diffuse alveolar interstitial densities which have developed in the interval favored represent pulmonary edema. Bilateral basilar infiltrates greater in the left lung base suggest pneumonia. There is no pneumothorax. The   osseous structures are normal.      Impression:      Impression:  Cardiomegaly and pulmonary vascular congestion. Bilateral basilar airspace disease greatest in the left lung base is felt to be secondary to pneumonia.      Electronically Signed: Dipesh Borjas MD    5/24/2024 1:28 AM EDT    Workstation ID: [id number]    MRI Brain With & Without Contrast [[id number]] Collected: 05/23/24 2151     Updated: 05/23/24 2203    Narrative:        MRI BRAIN W WO CONTRAST    Date of Exam: 5/23/2024 8:19 PM EDT    Indication: persistent headaches.     Comparison: 5/20/2024 head CT scan    Technique:  Routine multiplanar/multisequence sequence images of the brain were obtained before and after the uneventful administration of 10 mL Multihance.      Findings:  Previous head CT scan " report noted no acute intracranial abnormality. Additional history from the patient's chart indicates fever, myalgias, night sweats headaches and fatigue.    No restricted diffusion is seen to suggest acute infarction. 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 There   is no evidence of mass, mass effect or edema, no evidence of hydrocephalus or abnormal extra-axial collection. No evidence of previous infarct is seen. There are no signal changes suggestive of hemorrhage. There is expected flow signal in the major   intracranial arteries and median sagittal sinus. Sagittal images show the midline structures to appear within normal limits. Included paranasal sinuses and mastoids appear clear except for trace ethmoid mucosal thickening. No gross abnormalities are seen   in the orbits.    Postcontrast images show no evidence of enhancing mass or other pathologic postcontrast brain or meningeal enhancement. There is normal contrast opacification of the major dural venous sinuses, with no visible filling defects.        Impression:      Impression:  Expected, chronic appearing changes of the aging brain. No evidence of acute intracranial disease is seen.        Electronically Signed: Bora Sims MD    5/23/2024 10:00 PM EDT    Workstation ID: [id number]    CT Chest Without Contrast Diagnostic [[id number]] Collected: 05/22/24 1242     Updated: 05/22/24 1251    Narrative:      CT CHEST WO CONTRAST DIAGNOSTIC, CT ABDOMEN PELVIS W CONTRAST    Date of Exam: 5/22/2024 12:11 PM EDT    Indication: cough. Abdominal pain    Comparison: None available.    Technique: Axial CT images were obtained of the chest without contrast administration. Axial CT images were obtained of the abdomen and pelvis after intravenous administration of 75 cc Isovue-300. Reconstructed coronal and sagittal images were  also   obtained. Automated exposure control and iterative construction methods were used.      Findings:    CT CHEST:  MEDIASTINUM: Unremarkable. Aortic and heart size are normal. No mass nor pericardial effusion.  CORONARY ARTERIES: No calcified atherosclerotic disease.  LUNGS: There is nodular and more confluent airspace disease within the lower lungs bilaterally, left more so than right. There is a degree of interlobular septal thickening/interstitial edema. Imaging features are most suggestive of multifocal pneumonia.   No single suspicious nodule is identified.  PLEURAL SPACE: There are small bilateral pleural effusions.        CT ABDOMEN AND PELVIS:   LIVER:  Unremarkable parenchyma without focal lesion.  BILIARY/GALLBLADDER:  Unremarkable  SPLEEN:  Unremarkable  PANCREAS:  Unremarkable  ADRENAL:  Unremarkable  KIDNEYS:  Unremarkable parenchyma with no solid mass identified. No obstruction.  No calculus identified.  GASTROINTESTINAL/MESENTERY:  No evidence of obstruction nor inflammation. The appendix is normal.  AORTA/IVC:  Normal caliber.    RETROPERITONEUM/LYMPH NODES:  Unremarkable    REPRODUCTIVE: There is a right-sided uterine fibroid. There is minimal free fluid in the pelvis, a frequent normal finding in females. No definitive etiology identified.  BLADDER:  Unremarkable    OSSEUS STRUCTURES:  Typical for age with no acute process identified.          Impression:      Impression:  1.Multifocal pneumonia with small bilateral parapneumonic effusions.  2.Minimal nonspecific free fluid in the pelvis, a frequent normal finding in females as no etiology identified.  3.Other incidental nonemergent findings as detailed above.        Electronically Signed: Reid Banerjee MD    5/22/2024 12:48 PM EDT    Workstation ID: [id number]    CT Abdomen Pelvis With Contrast [[id number]] Collected: 05/22/24 1242     Updated: 05/22/24 1251    Narrative:      CT CHEST WO CONTRAST DIAGNOSTIC, CT ABDOMEN PELVIS W CONTRAST    Date  of Exam: 5/22/2024 12:11 PM EDT    Indication: cough. Abdominal pain    Comparison: None available.    Technique: Axial CT images were obtained of the chest without contrast administration. Axial CT images were obtained of the abdomen and pelvis after intravenous administration of 75 cc Isovue-300. Reconstructed coronal and sagittal images were also   obtained. Automated exposure control and iterative construction methods were used.      Findings:    CT CHEST:  MEDIASTINUM: Unremarkable. Aortic and heart size are normal. No mass nor pericardial effusion.  CORONARY ARTERIES: No calcified atherosclerotic disease.  LUNGS: There is nodular and more confluent airspace disease within the lower lungs bilaterally, left more so than right. There is a degree of interlobular septal thickening/interstitial edema. Imaging features are most suggestive of multifocal pneumonia.   No single suspicious nodule is identified.  PLEURAL SPACE: There are small bilateral pleural effusions.        CT ABDOMEN AND PELVIS:   LIVER:  Unremarkable parenchyma without focal lesion.  BILIARY/GALLBLADDER:  Unremarkable  SPLEEN:  Unremarkable  PANCREAS:  Unremarkable  ADRENAL:  Unremarkable  KIDNEYS:  Unremarkable parenchyma with no solid mass identified. No obstruction.  No calculus identified.  GASTROINTESTINAL/MESENTERY:  No evidence of obstruction nor inflammation. The appendix is normal.  AORTA/IVC:  Normal caliber.    RETROPERITONEUM/LYMPH NODES:  Unremarkable    REPRODUCTIVE: There is a right-sided uterine fibroid. There is minimal free fluid in the pelvis, a frequent normal finding in females. No definitive etiology identified.  BLADDER:  Unremarkable    OSSEUS STRUCTURES:  Typical for age with no acute process identified.          Impression:      Impression:  1.Multifocal pneumonia with small bilateral parapneumonic effusions.  2.Minimal nonspecific free fluid in the pelvis, a frequent normal finding in females as no etiology  identified.  3.Other incidental nonemergent findings as detailed above.        Electronically Signed: Reid Banerjee MD    5/22/2024 12:48 PM EDT    Workstation ID: [id number]    XR Shoulder 2+ View Bilateral [[id number]] Collected: 05/22/24 0942     Updated: 05/22/24 0946    Narrative:      XR SHOULDER 2+ VW BILATERAL    Date of Exam: 5/22/2024 9:15 AM EDT    Indication: shoulder pain    Comparison: None available.    Findings:  No acute fracture is identified. No focal osseous abnormality is identified. Mild degenerative changes are present along both shoulders. The soft tissues are unremarkable.      Impression:      Impression:  1.No acute osseous process identified.  2.Mild degenerative changes as described above.      Electronically Signed: Man Puri MD    5/22/2024 9:43 AM EDT    Workstation ID: [id number]        I independently read the patient's chest x-ray from 5/24-does have some component of acid congestion and bibasilar airspace disease.    IMPRESSION:     Problems:  Sepsis with recent fevers at home, leukocytosis with neutrophilia, bandemia, tachycardia- I suspect that she had pneumonia and an autoimmune process that was triggered such as reactive arthritis.  She is now clinically improving on antibiotics and NSAIDs.  Pneumonia-Improving  Mild hypoxia- Improved.  She is now breathing well on room air.  Acute kidney injury-Improved  Pyuria-Urine culture was no growth  Headaches-improving.  MRI brain was without acute changes  Pain, swelling, and erythema just proximal to her bilateral wrists over the dorsal aspect of her arms.  Her initial risks swelling and erythema has improved but she subsequently developed some erythematous, tender nodules over multiple fingers on both of her hands. Now improving after mobic started on 5/23.    RECOMMENDATIONS:    -Continue to follow CBC and CMP closely  -Follow blood cultures, urine culture, and CSF cultures- NGTD  -KAYLA panel was negative. ANCA panel  Negative  -rheumatoid factor-Was within normal limits  -Sent ACE level- pending  -Follow urine histo Ag-Pending  -Continue ceftriaxone but okay to change to 2 g IV every 24 hours dosing  -Continue doxycycline 100 mg by mouth twice a day.  -MRI brain without any acute changes  -Continue Mobic for now in case of reactive arthritis.    I would be okay with the patient's discharge soon with the below plan    UM/LICO:  Doxycycline 100 mg by mouth twice a day  Cefuroxime 5 mg by mouth twice a day  Anticipated end date of the above antibiotics is 5/27/24.  Consider giving her mobic for 1 more week and then I will reassess on an outpatient basis  Follow up in my clinic on 5/29/24. Clinic to call with the appointment time    I reviewed the patient's labs, micro, and medications today    I discussed the patient's complex case with Dr. Gracia again today    I will be off until 5/28/24.  Please page 1 of my on-call partners with any urgent issues/if the patient declines    Thank you for asking me to see Linda Good.      Complex MDM    Man Mandel MD  5/24/2024

## 2024-05-24 NOTE — CONSULTS
[facility] Neurology  Consult Note    Patient Name: Linda Good  : 1955  MRN: [id number]  Primary Care Physician:  Julissa Vernon PA-C  Referring Physician: No ref. provider found  Date of admission: 2024    Subjective     Reason for Consult/ Chief Complaint: Persistent headache    Linda Good is a 69 y.o. female with past medical history of HTN and HLD who presented to [facility] ED on 2024 with complaint of generalized headache for the past 12 days and a fever of 101.9. On day for patient's symptoms she was concerned she was experiencing UTI and reported to her PCP which placed her on Macrobid.  2 days after that patient reports that she continued to have symptoms and went back to her PCP to where she was transitioned to Cefdinir.  Patient then continued to have on and off fevers, night sweats, headache, fatigue and loss of appetite which she reported to [facility] ED.  [place].  However she had not recently been hiking.  She denies any bug or tick bites.  She did have a recent rash.  She denies any recent travel.  General neurology was asked to see patient giving ongoing headache.    Patient underwent spinal tap in the ED, CSF WBCs 29, RBCs 20,000, glucose 64 and protein 95.3.  Meningitis panel negative.  KAYLA and ANCA panel negative.  RF within normal limits.      On exam patient was working with physical therapy.  She was able to ambulate throughout room.  She denies any headache at this time.  No focal neurologic deficit noted.    Review Of Systems   Constitutional: Well-developed female  Cardiovascular: Negative for chest pain or palpitations.  Respiratory: Negative for shortness of breath or cough.  Gastrointestinal: Negative for nausea and vomiting.  Genitourinary: Negative for bladder incontinence.  Musculoskeletal: Negative for aches and pains in the muscles or joints.  Dermatological: Negative for skin  breakdown.   Neurological: Positive for headache    Personal History     Past Medical History:   Diagnosis Date    Diabetes mellitus 3/1/1993    Gestational    Hyperlipidemia     Hypertension     Pregnancy     1993,1982,1980,1978       Past Surgical History:   Procedure Laterality Date    BREAST SURGERY  1997    Lumpectomy/Benign    COLONOSCOPY      INTERVENTIONAL RADIOLOGY PROCEDURE N/A 5/20/2024    Procedure: IR fluoroscopy guided lumbar puncture diagnostic;  Surgeon: Epi Blackburn MD;  Location: [facility] INVASIVE LOCATION;  Service: Interventional Radiology;  Laterality: N/A;    TUBAL ABDOMINAL LIGATION  1993       Family History: family history includes Cancer in her father; Coronary artery disease in her sister; Diabetes in her mother; Heart disease in her mother; Lung cancer in her father; Other in her mother. Otherwise pertinent FHx was reviewed and not pertinent to current issue.    Social History:  reports that she quit smoking about 22 years ago. Her smoking use included cigarettes. She started smoking about 49 years ago. She has a 13.5 pack-year smoking history. She has never used smokeless tobacco. She reports current alcohol use of about 10.0 standard drinks of alcohol per week. She reports that she does not use drugs.    Home Medications:   calcium carbonate, fenofibrate, hydroCHLOROthiazide, ibuprofen, lisinopril, rosuvastatin, vitamin B-1, vitamin C, and vitamin E    Current Medications:     Current Facility-Administered Medications:     acetaminophen (TYLENOL) tablet 650 mg, 650 mg, Oral, Q4H PRN, Sweta Gracia MD, 650 mg at 05/24/24 0126    amLODIPine (NORVASC) tablet 5 mg, 5 mg, Oral, Q24H, Sweta Gracia MD    sennosides-docusate (PERICOLACE) 8.6-50 MG per tablet 2 tablet, 2 tablet, Oral, BID PRN **AND** polyethylene glycol (MIRALAX) packet 17 g, 17 g, Oral, Daily PRN, 17 g at 05/23/24 1251 **AND** bisacodyl (DULCOLAX) EC tablet 5 mg, 5 mg, Oral, Daily PRN **AND** bisacodyl  (DULCOLAX) suppository 10 mg, 10 mg, Rectal, Daily PRN, Jass Greenberg MD    cefTRIAXone (ROCEPHIN) 2,000 mg in sodium chloride 0.9 % 100 mL MBP, 2,000 mg, Intravenous, Q24H, Man Mandel MD, Last Rate: 200 mL/hr at 05/24/24 0921, 2,000 mg at 05/24/24 0921    doxycycline (MONODOX) capsule 100 mg, 100 mg, Oral, Q12H, Rosey Mora MD, 100 mg at 05/24/24 0919    heparin (porcine) 5000 UNIT/ML injection 5,000 Units, 5,000 Units, Subcutaneous, Q8H, Jass Greenberg MD, 5,000 Units at 05/24/24 0559    hydrOXYzine (ATARAX) tablet 10 mg, 10 mg, Oral, TID PRN, Sweta Gracia MD, 10 mg at 05/23/24 2202    Lidocaine 4 % 1 patch, 1 patch, Transdermal, Q24H, Melissa Payne, [facility], 1 patch at 05/22/24 1731    meloxicam (MOBIC) tablet 7.5 mg, 7.5 mg, Oral, Daily, Sweta Gracia MD, 7.5 mg at 05/24/24 0919    morphine injection 2 mg, 2 mg, Intravenous, Q4H PRN, Rosey Mora MD, 2 mg at 05/23/24 1757    naloxone (NARCAN) injection 0.4 mg, 0.4 mg, Intravenous, Q5 Min PRN, Rosey Mora MD    ondansetron ODT (ZOFRAN-ODT) disintegrating tablet 4 mg, 4 mg, Oral, Q6H PRN, 4 mg at 05/22/24 2207 **OR** ondansetron (ZOFRAN) injection 4 mg, 4 mg, Intravenous, Q6H PRN, Jass Greenberg MD, 4 mg at 05/22/24 0335    oxyCODONE (ROXICODONE) immediate release tablet 5 mg, 5 mg, Oral, Q6H PRN, Rosey Mora MD, 5 mg at 05/24/24 0558    pantoprazole (PROTONIX) EC tablet 40 mg, 40 mg, Oral, Q AM, Sweta Gracia MD, 40 mg at 05/24/24 0558    rosuvastatin (CRESTOR) tablet 5 mg, 5 mg, Oral, Nightly, Jass Greenberg MD, 5 mg at 05/23/24 2156    sodium chloride 0.9 % flush 10 mL, 10 mL, Intravenous, Q12H, Jass Greenberg MD, 10 mL at 05/24/24 0922    sodium chloride 0.9 % flush 10 mL, 10 mL, Intravenous, PRN, Jass Greenberg MD    sodium chloride 0.9 % infusion 40 mL, 40 mL, Intravenous, PRN, Jass Greenberg MD    sodium chloride 0.9 % infusion, 50 mL/hr, Intravenous, Continuous, Sweta Gracia MD, Last Rate: 50 mL/hr at 05/23/24  1948, 50 mL/hr at 05/23/24 1948    temazepam (RESTORIL) capsule 15 mg, 15 mg, Oral, Nightly PRN, Jass Greenberg MD, 15 mg at 05/23/24 2202    thiamine (VITAMIN B-1) tablet 50 mg, 50 mg, Oral, Daily, Jass Greenberg MD, 50 mg at 05/24/24 0919     Allergies:  No Known Allergies    Objective     Physical Exam  Vitals and nursing note reviewed.   Constitutional:       General: She is not in acute distress.     Appearance: She is not ill-appearing.   Eyes:      Extraocular Movements: Extraocular movements intact.      Pupils: Pupils are equal, round, and reactive to light.      Comments: No nystagmus or deviated gaze noted   Neurological:      Mental Status: She is alert and oriented to person, place, and time.      Cranial Nerves: Cranial nerves 2-12 are intact.      Sensory: Sensation is intact.      Motor: Motor function is intact. No weakness, tremor or seizure activity.      Coordination: Coordination is intact. Finger-Nose-Finger Test normal.      Gait: Gait is intact.      Deep Tendon Reflexes: Babinski sign absent on the right side. Babinski sign absent on the left side.      Reflex Scores:       Bicep reflexes are 2+ on the right side and 2+ on the left side.       Patellar reflexes are 2+ on the right side and 2+ on the left side.     Comments:     Cranial Nerves   CN II: Pupils are equal, round, and reactive to light. Normal visual acuity and visual fields.    CN III IV VI: Extraocular movements are full without nystagmus.  CN V: Normal facial sensation and strength of muscles of mastication.  CN VII: Facial movements are symmetric. No weakness.  CN VIII:  Auditory acuity is normal.  CN IX & X:  Symmetric palatal movement.  CN XI: Sternocleidomastoid and trapezius are normal.  No weakness.  CN XII: The tongue is midline.  No atrophy or fasciculations.             Vitals:  Temp:  [98 °F (36.7 °C)-98.3 °F (36.8 °C)] 98.1 °F (36.7 °C)  Heart Rate:  [87-99] 92  Resp:  [16-18] 18  BP: (144-169)/(81-92)  "164/90  Flow (L/min):  [2-4.5] 4    Laboratory Results:   Lab Results   Component Value Date    GLUCOSE 80 05/24/2024    CALCIUM 8.6 05/24/2024     05/24/2024    K 3.9 05/24/2024    CO2 28.0 05/24/2024     05/24/2024    BUN 8 05/24/2024    CREATININE 0.54 (L) 05/24/2024    BCR 14.8 05/24/2024    ANIONGAP 10.0 05/24/2024     Lab Results   Component Value Date    WBC 10.99 (H) 05/24/2024    HGB 11.6 (L) 05/24/2024    HCT 35.8 05/24/2024    MCV 97.8 (H) 05/24/2024     (H) 05/24/2024     No results found for: \"CHOL\"  Lab Results   Component Value Date    HDL 64 04/11/2024    HDL 70 07/28/2023    HDL 82 08/12/2022     Lab Results   Component Value Date    LDL 38 04/11/2024    LDL 33 07/28/2023    LDL 66 08/12/2022     Lab Results   Component Value Date    TRIG 519 (H) 04/11/2024    TRIG 380 (H) 07/28/2023    TRIG 292 (H) 08/12/2022     Lab Results   Component Value Date    HGBA1C 5.6 04/11/2024     No results found for: \"INR\", \"PROTIME\"  Lab Results   Component Value Date    [id number] 07/28/2023     Lab Results   Component Value Date    FOLATE 7.2 07/28/2023       MRI Brain With & Without Contrast    Result Date: 5/23/2024  MRI BRAIN W WO CONTRAST Date of Exam: 5/23/2024 8:19 PM EDT Indication: persistent headaches.  Comparison: 5/20/2024 head CT scan Technique:  Routine multiplanar/multisequence sequence images of the brain were obtained before and after the uneventful administration of 10 mL Multihance. Findings: Previous head CT scan report noted no acute intracranial abnormality. Additional history from the patient's chart indicates fever, myalgias, night sweats headaches and fatigue. No restricted diffusion is seen to suggest acute infarction. 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 There is no evidence of mass, mass effect or edema, no evidence of " hydrocephalus or abnormal extra-axial collection. No evidence of previous infarct is seen. There are no signal changes suggestive of hemorrhage. There is expected flow signal in the major intracranial arteries and median sagittal sinus. Sagittal images show the midline structures to appear within normal limits. Included paranasal sinuses and mastoids appear clear except for trace ethmoid mucosal thickening. No gross abnormalities are seen  in the orbits. Postcontrast images show no evidence of enhancing mass or other pathologic postcontrast brain or meningeal enhancement. There is normal contrast opacification of the major dural venous sinuses, with no visible filling defects.     Impression: Impression: Expected, chronic appearing changes of the aging brain. No evidence of acute intracranial disease is seen. Electronically Signed: Bora Sims MD  5/23/2024 10:00 PM EDT  Workstation ID: [id number]         Latest Reference Range & Units 05/20/24 18:30   Supernatant Color, CSF Colorless  Colorless   Appearance, CSF Clear  Cloudy !   Color, CSF Colorless  Red !   Tube Number, CSF  4   Volume, CSF mL 10.3   WBC, CSF 0 - 5 /mm3 29 (H)   RBC, CSF 0 - 5 /mm3 20,000 (H)   Neutrophils, CSF 0 - 6 % 66 (H)   Lymphocytes, CSF 62 - 100 % 23 (L)   Eosinophils, CSF % 2   Macrophage, CSF % 9   Glucose, CSF 40 - 70 mg/dL 64   Protein, Total (CSF) 15.0 - 45.0 mg/dL 95.3 (H)   !: Data is abnormal  (H): Data is abnormally high  (L): Data is abnormally low  Assessment / Plan   Brief Patient Summary:  Linda Good is a 69 y.o. female with past medical history of HTN and HLD who presented to [facility] ED on 5/20/2024 with complaint of generalized headache for the past 12 days and a fever of 101.9. On day for patient's symptoms she was concerned she was experiencing UTI and reported to her PCP which placed her on Macrobid.  2 days after that patient reports that she continued to have symptoms and went back to her PCP to where she was  transitioned to Cefdinir.  Patient then continued to have on and off fevers, night sweats, headache, fatigue and loss of appetite which she reported to [facility] ED.      Plan:   Intractable headache-resolved  MRI brain with and without contrast showed no acute intracranial abnormalities.  Mild chronic microvascular changes noted.  CSF not consistent with infection, traumatic tap noted.  Meningitis panel negative.  ID following, appreciate recommendations.  Patient currently on doxycycline 100 mg twice daily, Cefuroxime 5 mg twice daily and Mobic with concern of reactive arthritis.  As needed migraine cocktail consisting of IV Benadryl and IV Toradol.  If patient has nausea IV Compazine available.  Patient continue work with PT/OT  Up to chair as tolerated  Patient to follow-up with PCP  Patient symptoms have resolved.  General neurology will see patient on request.  Please feel free to reach out with any questions.    I have discussed the above with the patient, bedside RN and Dr. Gracia  Time spent with patient: 80 minutes in face-to-face evaluation and management of the patient.       OTTONIEL Zavala

## 2024-05-24 NOTE — PLAN OF CARE
Goal Outcome Evaluation:  Plan of Care Reviewed With: (P) patient        Progress: (P) no change  Outcome Evaluation: (P) PT initial eval completed. Pt presents below her functional baseline w/ decreased strength and impaired endurance. Pt ambulated 40' with CGA w/o AD. Activity limited by elevated BP. Patient on room air throughout session w/o c/o of SOB. No overt LOB or knee buckling. Further IPPT is warranted to address residual weakness and return to previous functional capacity. PT recommend d/c to home w/ assist and OPPT.      Anticipated Discharge Disposition (PT): (P) home with outpatient therapy services, home with assist

## 2024-05-24 NOTE — CASE MANAGEMENT/SOCIAL WORK
Continued Stay Note  [facility]     Patient Name: Linda Good  MRN: [id number]  Today's Date: 5/24/2024    Admit Date: 5/20/2024    Plan: Home   Discharge Plan       Row Name 05/24/24 1542       Plan    Plan Home    Plan Comments I spoke with patient earlier today. She plans home tomorrow and is fine with  Home Care. [facility] has the referral and is aware of pending discharge tomorrow    Final Discharge Disposition Code 06 - home with home health care                   Discharge Codes    No documentation.                 Expected Discharge Date and Time       Expected Discharge Date Expected Discharge Time    May 24, 2024               Johanna Loyola RN

## 2024-05-25 ENCOUNTER — READMISSION MANAGEMENT (OUTPATIENT)
Dept: CALL CENTER | Facility: HOSPITAL | Age: 69
End: 2024-05-25
Payer: MEDICARE

## 2024-05-25 ENCOUNTER — HOME HEALTH ADMISSION (OUTPATIENT)
Dept: HOME HEALTH SERVICES | Facility: HOME HEALTHCARE | Age: 69
End: 2024-05-25
Payer: MEDICARE

## 2024-05-25 VITALS
BODY MASS INDEX: 20.7 KG/M2 | DIASTOLIC BLOOD PRESSURE: 80 MMHG | RESPIRATION RATE: 16 BRPM | WEIGHT: 116.84 LBS | OXYGEN SATURATION: 96 % | SYSTOLIC BLOOD PRESSURE: 156 MMHG | HEART RATE: 92 BPM | TEMPERATURE: 98.3 F | HEIGHT: 63 IN

## 2024-05-25 PROBLEM — R50.9 FEVER: Status: RESOLVED | Noted: 2024-05-20 | Resolved: 2024-05-25

## 2024-05-25 PROBLEM — D72.825 BANDEMIA: Status: RESOLVED | Noted: 2024-05-20 | Resolved: 2024-05-25

## 2024-05-25 PROBLEM — N17.9 AKI (ACUTE KIDNEY INJURY): Status: RESOLVED | Noted: 2024-05-20 | Resolved: 2024-05-25

## 2024-05-25 PROBLEM — A41.9 SEPSIS: Status: RESOLVED | Noted: 2024-05-22 | Resolved: 2024-05-25

## 2024-05-25 LAB
ANION GAP SERPL CALCULATED.3IONS-SCNC: 10 MMOL/L (ref 5–15)
BACTERIA SPEC AEROBE CULT: NORMAL
BACTERIA SPEC AEROBE CULT: NORMAL
BASOPHILS # BLD AUTO: 0.12 10*3/MM3 (ref 0–0.2)
BASOPHILS NFR BLD AUTO: 1.2 % (ref 0–1.5)
BUN SERPL-MCNC: 10 MG/DL (ref 8–23)
BUN/CREAT SERPL: 18.9 (ref 7–25)
CALCIUM SPEC-SCNC: 8.2 MG/DL (ref 8.6–10.5)
CHLORIDE SERPL-SCNC: 104 MMOL/L (ref 98–107)
CO2 SERPL-SCNC: 26 MMOL/L (ref 22–29)
CREAT SERPL-MCNC: 0.53 MG/DL (ref 0.57–1)
DEPRECATED RDW RBC AUTO: 44.2 FL (ref 37–54)
EGFRCR SERPLBLD CKD-EPI 2021: 101.5 ML/MIN/1.73
EOSINOPHIL # BLD AUTO: 1.1 10*3/MM3 (ref 0–0.4)
EOSINOPHIL NFR BLD AUTO: 11 % (ref 0.3–6.2)
ERYTHROCYTE [DISTWIDTH] IN BLOOD BY AUTOMATED COUNT: 12.3 % (ref 12.3–15.4)
GLUCOSE SERPL-MCNC: 84 MG/DL (ref 65–99)
HCT VFR BLD AUTO: 31.5 % (ref 34–46.6)
HGB BLD-MCNC: 10.2 G/DL (ref 12–15.9)
IMM GRANULOCYTES # BLD AUTO: 0.59 10*3/MM3 (ref 0–0.05)
IMM GRANULOCYTES NFR BLD AUTO: 5.9 % (ref 0–0.5)
LYMPHOCYTES # BLD AUTO: 2.16 10*3/MM3 (ref 0.7–3.1)
LYMPHOCYTES NFR BLD AUTO: 21.6 % (ref 19.6–45.3)
MAGNESIUM SERPL-MCNC: 2 MG/DL (ref 1.6–2.4)
MCH RBC QN AUTO: 31.6 PG (ref 26.6–33)
MCHC RBC AUTO-ENTMCNC: 32.4 G/DL (ref 31.5–35.7)
MCV RBC AUTO: 97.5 FL (ref 79–97)
MONOCYTES # BLD AUTO: 0.83 10*3/MM3 (ref 0.1–0.9)
MONOCYTES NFR BLD AUTO: 8.3 % (ref 5–12)
NEUTROPHILS NFR BLD AUTO: 5.19 10*3/MM3 (ref 1.7–7)
NEUTROPHILS NFR BLD AUTO: 52 % (ref 42.7–76)
NRBC BLD AUTO-RTO: 0 /100 WBC (ref 0–0.2)
PLATELET # BLD AUTO: 525 10*3/MM3 (ref 140–450)
PMV BLD AUTO: 10.1 FL (ref 6–12)
POTASSIUM SERPL-SCNC: 3.2 MMOL/L (ref 3.5–5.2)
RBC # BLD AUTO: 3.23 10*6/MM3 (ref 3.77–5.28)
SODIUM SERPL-SCNC: 140 MMOL/L (ref 136–145)
WBC NRBC COR # BLD AUTO: 9.99 10*3/MM3 (ref 3.4–10.8)

## 2024-05-25 PROCEDURE — 85025 COMPLETE CBC W/AUTO DIFF WBC: CPT | Performed by: FAMILY MEDICINE

## 2024-05-25 PROCEDURE — 83735 ASSAY OF MAGNESIUM: CPT | Performed by: FAMILY MEDICINE

## 2024-05-25 PROCEDURE — 99239 HOSP IP/OBS DSCHRG MGMT >30: CPT | Performed by: FAMILY MEDICINE

## 2024-05-25 PROCEDURE — 25010000002 CEFTRIAXONE PER 250 MG: Performed by: INTERNAL MEDICINE

## 2024-05-25 PROCEDURE — 25010000002 HEPARIN (PORCINE) PER 1000 UNITS: Performed by: INTERNAL MEDICINE

## 2024-05-25 PROCEDURE — 80048 BASIC METABOLIC PNL TOTAL CA: CPT | Performed by: FAMILY MEDICINE

## 2024-05-25 RX ORDER — AMLODIPINE BESYLATE 5 MG/1
5 TABLET ORAL
Qty: 60 TABLET | Refills: 0 | Status: SHIPPED | OUTPATIENT
Start: 2024-05-26 | End: 2024-07-25

## 2024-05-25 RX ORDER — MELOXICAM 7.5 MG/1
7.5 TABLET ORAL DAILY
Qty: 6 TABLET | Refills: 0 | Status: SHIPPED | OUTPATIENT
Start: 2024-05-26 | End: 2024-06-01

## 2024-05-25 RX ORDER — POTASSIUM CHLORIDE 20 MEQ/1
40 TABLET, EXTENDED RELEASE ORAL EVERY 4 HOURS
Status: COMPLETED | OUTPATIENT
Start: 2024-05-25 | End: 2024-05-25

## 2024-05-25 RX ORDER — PANTOPRAZOLE SODIUM 40 MG/1
40 TABLET, DELAYED RELEASE ORAL
Qty: 6 TABLET | Refills: 0 | Status: SHIPPED | OUTPATIENT
Start: 2024-05-26 | End: 2024-06-01

## 2024-05-25 RX ORDER — DOXYCYCLINE 100 MG/1
100 CAPSULE ORAL EVERY 12 HOURS SCHEDULED
Qty: 3 CAPSULE | Refills: 0 | Status: SHIPPED | OUTPATIENT
Start: 2024-05-25 | End: 2024-05-27

## 2024-05-25 RX ORDER — CEFUROXIME AXETIL 500 MG/1
500 TABLET ORAL 2 TIMES DAILY
Qty: 4 TABLET | Refills: 0 | Status: SHIPPED | OUTPATIENT
Start: 2024-05-25 | End: 2024-05-27

## 2024-05-25 RX ADMIN — Medication 10 ML: at 08:14

## 2024-05-25 RX ADMIN — ACETAMINOPHEN 650 MG: 325 TABLET ORAL at 08:14

## 2024-05-25 RX ADMIN — POTASSIUM CHLORIDE 40 MEQ: 1500 TABLET, EXTENDED RELEASE ORAL at 13:36

## 2024-05-25 RX ADMIN — DOXYCYCLINE 100 MG: 100 CAPSULE ORAL at 08:14

## 2024-05-25 RX ADMIN — POTASSIUM CHLORIDE 40 MEQ: 1500 TABLET, EXTENDED RELEASE ORAL at 09:35

## 2024-05-25 RX ADMIN — PANTOPRAZOLE SODIUM 40 MG: 40 TABLET, DELAYED RELEASE ORAL at 05:58

## 2024-05-25 RX ADMIN — HEPARIN SODIUM 5000 UNITS: 5000 INJECTION INTRAVENOUS; SUBCUTANEOUS at 05:57

## 2024-05-25 RX ADMIN — MELOXICAM 7.5 MG: 7.5 TABLET ORAL at 09:35

## 2024-05-25 RX ADMIN — CEFTRIAXONE 2000 MG: 2 INJECTION, POWDER, FOR SOLUTION INTRAMUSCULAR; INTRAVENOUS at 08:14

## 2024-05-25 RX ADMIN — ACETAMINOPHEN 650 MG: 325 TABLET ORAL at 12:48

## 2024-05-25 RX ADMIN — AMLODIPINE BESYLATE 5 MG: 5 TABLET ORAL at 08:14

## 2024-05-25 RX ADMIN — ACETAMINOPHEN 650 MG: 325 TABLET ORAL at 01:06

## 2024-05-25 RX ADMIN — THIAMINE HCL TAB 100 MG 50 MG: 100 TAB at 08:14

## 2024-05-25 NOTE — OUTREACH NOTE
Prep Survey      Flowsheet Row Responses   [facility] facility patient discharged from? [place]   Is LACE score < 7 ? No   Eligibility Readm Mgmt   Discharge diagnosis *Fever   Does the patient have one of the following disease processes/diagnoses(primary or secondary)? Other   Does the patient have Home health ordered? No   Is there a DME ordered? No   Prep survey completed? Yes            BALDEMAR CORONEL - Registered Nurse

## 2024-05-25 NOTE — NURSING NOTE
Pt. Went for a walk around the room on room air, oxygen saturation consistently stayed above 93%. Pt. Did not desat or require supplemental oxygen.

## 2024-05-25 NOTE — DISCHARGE SUMMARY
[facility] Medicine Services  DISCHARGE SUMMARY    Patient Name: Linda Good  : 1955  MRN: [id number]    Date of Admission: 2024  4:06 PM  Date of Discharge:  2024  Primary Care Physician: Julissa Vernon PA-C    Consults       Date and Time Order Name Status Description    2024  2:43 PM Inpatient Neurology Consult General Completed     2024  9:49 PM Inpatient Infectious Diseases Consult Completed             Hospital Course     Presenting Problem: Fever of unknown origin    Active Hospital Problems    Diagnosis  POA    Primary hypertension [I10]  Yes      Resolved Hospital Problems    Diagnosis Date Resolved POA    **Fever [R50.9] 2024 Yes    Sepsis [A41.9] 2024 Yes    BLADE (acute kidney injury) [N17.9] 2024 Yes    Bandemia [D72.825] 2024 Yes          Hospital Course:  Linda Good is a 69 y.o. female  with h/o HTN and HLD who reported fever up to 101.9 and off/on generalized headache, myalgias, night sweats, fatigue, and loss of appetite x about 12 days prior to presentation.         Sepsis with Bandemia, tachycardia  Multifocal pneumonia  Headache- resolved  --Infectious versus rheumatologic issue; does have chronic upper chest erythematous rash; on exam also has new erythema on bilateral wrists and right second MCP joint  --s/p LP with negative meningitis panel; appeared to have traumatic LP; not consistent with CNS infection  --No evidence of joint infection, TTE with no evidence of endocarditis  --s/p recent macrobid and then cefdinir for presumed UTI  --s/p vanc, rocephin, and ampicillin in ER.   --negative for COVID and flu.    --Respiratory panel negative   --follow blood cultures; NGTD  --no hardware, [place]  --consulted ID, discussed with Dr. Man Mandel on   --Ferritin mildly elevated 469, CK1 20, ESR elevated 57, CRP elevated 18  --Follow-up urine histo  antigen  --C-ANCA: <1:20  -- CT imaging obtained on 5/22 consistent with multifocal pneumonia; CT abdomen/pelvis with contrast unrevealing  -- Continue ceftriaxone, doxycycline added on  -- Legionella antigen negative, strep pneumo antigen negative.  MRSA screen negative     Pain, swelling, erythema of dorsal hands  --Not consistent with cellulitis; may represent reactive arthritis  --As needed pain control  --05/23: Spoke with Dr. Mandel on 05/23 can start Meloxicam and monitor renal function as high suspicion for reactive arthritis.  Since starting meloxicam significant improvement.     Bilateral shoulder pain  --X-rays unrevealing     BLADE, resolved  --Baseline creatinine around 0.75; creatinine 2 on admission, normalized with IV fluids  --05/23 Lisinopril was restarted however will transition to amlodipine given trial of Meloxicam. Hold HCTZ     HTN  HLD  --continue statin      Anxiety, situational   -Atarax 10 mg TID prn       Discharge Follow Up Recommendations for outpatient labs/diagnostics:  Patient is to follow-up with scheduled infectious disease appointment on 05/29/2024.  She is to follow-up with her primary care doctor within 1 week.  Patient is also aware to get an updated appointment with her cardiologist.    Day of Discharge     HPI:   Patient has remained afebrile, she passed a desaturation test, and she reports overall feeling much better.  She is wanting to be discharged.  Electrolytes replaced    Vital Signs:   Temp:  [97.8 °F (36.6 °C)-98.3 °F (36.8 °C)] 98.3 °F (36.8 °C)  Heart Rate:  [] 92  Resp:  [16-19] 16  BP: (134-187)/() 156/80  Flow (L/min):  [2] 2      Physical Exam:  Constitutional: No acute distress, awake, alert  HENT: NCAT, mucous membranes moist  Respiratory: Clear to auscultation bilaterally, respiratory effort normal   Cardiovascular: RRR, no murmurs, rubs, or gallops  Gastrointestinal: Positive bowel sounds, soft, nontender, nondistended  Musculoskeletal: No  bilateral ankle edema  Psychiatric: Cooperative yet anxious.  Neurologic: Oriented x 3, speech clear  Skin: Continued improvement of erythema over bilateral second metacarpal joint.    Pertinent  and/or Most Recent Results     LAB RESULTS:      Lab 05/25/24  0447 05/24/24  0805 05/23/24  0500 05/22/24  0432 05/21/24  0528 05/20/24  1350   WBC 9.99 10.99* 12.15* 11.76* 10.00 13.99*   HEMOGLOBIN 10.2* 11.6* 10.8* 10.5* 10.8* 12.2   HEMATOCRIT 31.5* 35.8 33.1* 32.3* 33.3* 37.9   PLATELETS 525* 566* 526* 525* 501* 609*   NEUTROS ABS 5.19 6.84 9.19* 9.17* 8.80* 12.03*   IMMATURE GRANS (ABS) 0.59* 0.44* 0.17*  --   --   --    LYMPHS ABS 2.16 2.30 1.80  --   --   --    MONOS ABS 0.83 0.93* 0.79  --   --   --    EOS ABS 1.10* 0.37 0.14 0.35 0.00 0.00   MCV 97.5* 97.8* 96.8 97.9* 98.5* 99.0*   SED RATE  --   --   --   --  57*  --    CRP  --   --   --   --  18.01*  --    PROCALCITONIN  --   --   --   --   --  1.89*   LACTATE  --   --   --   --   --  1.8         Lab 05/25/24 0447 05/24/24  0805 05/23/24  1400 05/23/24  0500 05/22/24  0432 05/21/24  0528 05/20/24  1350   SODIUM 140 141 139 137 138   < > 132*   POTASSIUM 3.2* 3.9 3.5 3.6 4.1   < > 4.2   CHLORIDE 104 103 105 101 105   < > 92*   CO2 26.0 28.0 26.0 24.0 22.0   < > 22.0   ANION GAP 10.0 10.0 8.0 12.0 11.0   < > 18.0*   BUN 10 8 7* 8 9   < > 36*   CREATININE 0.53* 0.54* 0.56* 0.48* 0.58   < > 2.00*   EGFR 101.5 101.1 100.2 104.0 99.3   < > 26.9*   GLUCOSE 84 80 118* 79 85   < > 118*   CALCIUM 8.2* 8.6 8.4* 7.8* 8.1*   < > 9.2   MAGNESIUM 2.0  --   --   --   --   --  2.1   TSH  --   --   --   --   --   --  2.020    < > = values in this interval not displayed.         Lab 05/22/24  0432 05/21/24  0528 05/20/24  1350   TOTAL PROTEIN 5.4* 5.5* 7.4   ALBUMIN 3.3* 3.4* 3.9   GLOBULIN 2.1 2.1 3.5   ALT (SGPT) 24 34* 50*   AST (SGOT) 16 19 26   BILIRUBIN <0.2 <0.2 0.2   ALK PHOS 86 92 115                 Lab 05/21/24  0528   FERRITIN 469.80*         Brief Urine Lab Results   (Last result in the past 365 days)        Color   Clarity   Blood   Leuk Est   Nitrite   Protein   CREAT   Urine HCG        05/20/24 1441 Dark Yellow   Turbid   Negative   Trace   Negative   30 mg/dL (1+)                 Microbiology Results (last 10 days)       Procedure Component Value - Date/Time    S. Pneumo Ag Urine or CSF - Urine, Urine, Clean Catch [[id number]]  (Normal) Collected: 05/22/24 1706    Lab Status: Final result Specimen: Urine, Clean Catch Updated: 05/23/24 0006     Strep Pneumo Ag Negative    Legionella Antigen, Urine - Urine, Urine, Clean Catch [[id number]]  (Normal) Collected: 05/22/24 1706    Lab Status: Final result Specimen: Urine, Clean Catch Updated: 05/23/24 0006     LEGIONELLA ANTIGEN, URINE Negative    MRSA Screen, PCR (Inpatient) - Swab, Nares [[id number]]  (Normal) Collected: 05/22/24 1436    Lab Status: Final result Specimen: Swab from Nares Updated: 05/22/24 1618     MRSA PCR Negative    Narrative:      The negative predictive value of this diagnostic test is high and should only be used to consider de-escalating anti-MRSA therapy. A positive result may indicate colonization with MRSA and must be correlated clinically.  MRSA Negative    Respiratory Panel PCR w/COVID-19(SARS-CoV-2) [facility]/YENI/[facility]/PAD/[facility]/LEONA In-House, NP Swab in UTM/VTM, 2 HR TAT - Swab, Nasopharynx [[id number]]  (Normal) Collected: 05/22/24 1436    Lab Status: Final result Specimen: Swab from Nasopharynx Updated: 05/22/24 1556     ADENOVIRUS, PCR Not Detected     Coronavirus 229E Not Detected     Coronavirus HKU1 Not Detected     Coronavirus NL63 Not Detected     Coronavirus OC43 Not Detected     COVID19 Not Detected     Human Metapneumovirus Not Detected     Human Rhinovirus/Enterovirus Not Detected     Influenza A PCR Not Detected     Influenza B PCR Not Detected     Parainfluenza Virus 1 Not Detected     Parainfluenza Virus 2 Not Detected     Parainfluenza Virus 3 Not Detected     Parainfluenza Virus 4 Not Detected      RSV, PCR Not Detected     Bordetella pertussis pcr Not Detected     Bordetella parapertussis PCR Not Detected     Chlamydophila pneumoniae PCR Not Detected     Mycoplasma pneumo by PCR Not Detected    Narrative:      In the setting of a positive respiratory panel with a viral infection PLUS a negative procalcitonin without other underlying concern for bacterial infection, consider observing off antibiotics or discontinuation of antibiotics and continue supportive care. If the respiratory panel is positive for atypical bacterial infection (Bordetella pertussis, Chlamydophila pneumoniae, or Mycoplasma pneumoniae), consider antibiotic de-escalation to target atypical bacterial infection.    Culture, CSF - Cerebrospinal Fluid, Lumbar Puncture [[id number]] Collected: 05/20/24 1830    Lab Status: Final result Specimen: Cerebrospinal Fluid from Lumbar Puncture Updated: 05/23/24 0722     CSF Culture No growth at 3 days     Gram Stain Moderate (3+) Red blood cells      Occasional WBCs seen      No organisms seen    Meningitis / Encephalitis Panel, PCR - Cerebrospinal Fluid, Lumbar Puncture [[id number]]  (Normal) Collected: 05/20/24 1830    Lab Status: Final result Specimen: Cerebrospinal Fluid from Lumbar Puncture Updated: 05/20/24 2115     ESCHERICHIA COLI K1, PCR Not Detected     HAEMOPHILUS INFLUENZAE, PCR Not Detected     LISTERIA MONOCYTOGENES, PCR Not Detected     NEISSERIA MENINGITIDIS, PCR Not Detected     STREPTOCOCCUS AGALACTIAE, PCR Not Detected     STREPTOCOCCUS PNEUMONIAE, PCR Not Detected     CYTOMEGALOVIRUS (CMV), PCR Not Detected     ENTEROVIRUS, PCR Not Detected     HERPES SIMPLEX VIRUS 1 (HSV-1), PCR Not Detected     HERPES SIMPLEX VIRUS 2 (HSV-2), PCR Not Detected     HUMAN PARECHOVIRUS, PCR Not Detected     VARICELLA ZOSTER VIRUS (VZV), PCR Not Detected     CRYPTOCOCCUS NEOFORMANS / GATTII, PCR Not Detected     HUMAN HERPES VIRUS 6 PCR Not Detected    Urine Culture - Urine, Urine, Clean Catch [[id number]]   (Normal) Collected: 05/20/24 1441    Lab Status: Final result Specimen: Urine, Clean Catch Updated: 05/22/24 0921     Urine Culture No growth    Blood Culture - Blood, Arm, Right [[id number]]  (Normal) Collected: 05/20/24 1438    Lab Status: Preliminary result Specimen: Blood from Arm, Right Updated: 05/24/24 1515     Blood Culture No growth at 4 days    Respiratory Panel PCR w/COVID-19(SARS-CoV-2) [facility]I/[facility] In-House, NP Swab in UTM/VTM, 2 HR TAT - Swab, Nasopharynx [[id number]]  (Normal) Collected: 05/20/24 1434    Lab Status: Final result Specimen: Swab from Nasopharynx Updated: 05/20/24 1546     ADENOVIRUS, PCR Not Detected     Coronavirus 229E Not Detected     Coronavirus HKU1 Not Detected     Coronavirus NL63 Not Detected     Coronavirus OC43 Not Detected     COVID19 Not Detected     Human Metapneumovirus Not Detected     Human Rhinovirus/Enterovirus Not Detected     Influenza A PCR Not Detected     Influenza B PCR Not Detected     Parainfluenza Virus 1 Not Detected     Parainfluenza Virus 2 Not Detected     Parainfluenza Virus 3 Not Detected     Parainfluenza Virus 4 Not Detected     RSV, PCR Not Detected     Bordetella pertussis pcr Not Detected     Bordetella parapertussis PCR Not Detected     Chlamydophila pneumoniae PCR Not Detected     Mycoplasma pneumo by PCR Not Detected    Narrative:      In the setting of a positive respiratory panel with a viral infection PLUS a negative procalcitonin without other underlying concern for bacterial infection, consider observing off antibiotics or discontinuation of antibiotics and continue supportive care. If the respiratory panel is positive for atypical bacterial infection (Bordetella pertussis, Chlamydophila pneumoniae, or Mycoplasma pneumoniae), consider antibiotic de-escalation to target atypical bacterial infection.    Blood Culture - Blood, Arm, Left [[id number]]  (Normal) Collected: 05/20/24 1350    Lab Status: Final result Specimen: Blood from Arm,  Left Updated: 05/25/24 1415     Blood Culture No growth at 5 days            XR Chest 1 View    Result Date: 5/24/2024  XR CHEST 1 VW Date of Exam: 5/24/2024 1:04 AM EDT Indication: DYSPNEA, CARDIAC ORIGIN SUSPECTED increasing oxygen requirement Comparison: May 20, 2024 Findings: The heart is enlarged. There are diffuse alveolar interstitial densities which have developed in the interval favored represent pulmonary edema. Bilateral basilar infiltrates greater in the left lung base suggest pneumonia. There is no pneumothorax. The osseous structures are normal.     Impression: Cardiomegaly and pulmonary vascular congestion. Bilateral basilar airspace disease greatest in the left lung base is felt to be secondary to pneumonia. Electronically Signed: Dipesh Borjas MD  5/24/2024 1:28 AM EDT  Workstation ID: [id number]    MRI Brain With & Without Contrast    Result Date: 5/23/2024  MRI BRAIN W WO CONTRAST Date of Exam: 5/23/2024 8:19 PM EDT Indication: persistent headaches.  Comparison: 5/20/2024 head CT scan Technique:  Routine multiplanar/multisequence sequence images of the brain were obtained before and after the uneventful administration of 10 mL Multihance. Findings: Previous head CT scan report noted no acute intracranial abnormality. Additional history from the patient's chart indicates fever, myalgias, night sweats headaches and fatigue. No restricted diffusion is seen to suggest acute infarction. 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 There is no evidence of mass, mass effect or edema, no evidence of hydrocephalus or abnormal extra-axial collection. No evidence of previous infarct is seen. There are no signal changes suggestive of hemorrhage. There is expected flow signal in the major intracranial arteries and median sagittal sinus. Sagittal images show the midline  structures to appear within normal limits. Included paranasal sinuses and mastoids appear clear except for trace ethmoid mucosal thickening. No gross abnormalities are seen  in the orbits. Postcontrast images show no evidence of enhancing mass or other pathologic postcontrast brain or meningeal enhancement. There is normal contrast opacification of the major dural venous sinuses, with no visible filling defects.     Impression: Expected, chronic appearing changes of the aging brain. No evidence of acute intracranial disease is seen. Electronically Signed: Bora Sims MD  5/23/2024 10:00 PM EDT  Workstation ID: [id number]    CT Chest Without Contrast Diagnostic    Result Date: 5/22/2024  CT CHEST WO CONTRAST DIAGNOSTIC, CT ABDOMEN PELVIS W CONTRAST Date of Exam: 5/22/2024 12:11 PM EDT Indication: cough. Abdominal pain Comparison: None available. Technique: Axial CT images were obtained of the chest without contrast administration. Axial CT images were obtained of the abdomen and pelvis after intravenous administration of 75 cc Isovue-300. Reconstructed coronal and sagittal images were also obtained. Automated exposure control and iterative construction methods were used. Findings: CT CHEST: MEDIASTINUM: Unremarkable. Aortic and heart size are normal. No mass nor pericardial effusion. CORONARY ARTERIES: No calcified atherosclerotic disease. LUNGS: There is nodular and more confluent airspace disease within the lower lungs bilaterally, left more so than right. There is a degree of interlobular septal thickening/interstitial edema. Imaging features are most suggestive of multifocal pneumonia.  No single suspicious nodule is identified. PLEURAL SPACE: There are small bilateral pleural effusions. CT ABDOMEN AND PELVIS:  LIVER:  Unremarkable parenchyma without focal lesion. BILIARY/GALLBLADDER:  Unremarkable SPLEEN:  Unremarkable PANCREAS:  Unremarkable ADRENAL:  Unremarkable KIDNEYS:  Unremarkable parenchyma with no solid  mass identified. No obstruction.  No calculus identified. GASTROINTESTINAL/MESENTERY:  No evidence of obstruction nor inflammation. The appendix is normal. AORTA/IVC:  Normal caliber. RETROPERITONEUM/LYMPH NODES:  Unremarkable REPRODUCTIVE: There is a right-sided uterine fibroid. There is minimal free fluid in the pelvis, a frequent normal finding in females. No definitive etiology identified. BLADDER:  Unremarkable OSSEUS STRUCTURES:  Typical for age with no acute process identified.     Impression: 1.Multifocal pneumonia with small bilateral parapneumonic effusions. 2.Minimal nonspecific free fluid in the pelvis, a frequent normal finding in females as no etiology identified. 3.Other incidental nonemergent findings as detailed above. Electronically Signed: Reid Banerjee MD  5/22/2024 12:48 PM EDT  Workstation ID: [id number]    CT Abdomen Pelvis With Contrast    Result Date: 5/22/2024  CT CHEST WO CONTRAST DIAGNOSTIC, CT ABDOMEN PELVIS W CONTRAST Date of Exam: 5/22/2024 12:11 PM EDT Indication: cough. Abdominal pain Comparison: None available. Technique: Axial CT images were obtained of the chest without contrast administration. Axial CT images were obtained of the abdomen and pelvis after intravenous administration of 75 cc Isovue-300. Reconstructed coronal and sagittal images were also obtained. Automated exposure control and iterative construction methods were used. Findings: CT CHEST: MEDIASTINUM: Unremarkable. Aortic and heart size are normal. No mass nor pericardial effusion. CORONARY ARTERIES: No calcified atherosclerotic disease. LUNGS: There is nodular and more confluent airspace disease within the lower lungs bilaterally, left more so than right. There is a degree of interlobular septal thickening/interstitial edema. Imaging features are most suggestive of multifocal pneumonia.  No single suspicious nodule is identified. PLEURAL SPACE: There are small bilateral pleural effusions. CT ABDOMEN AND PELVIS:   LIVER:  Unremarkable parenchyma without focal lesion. BILIARY/GALLBLADDER:  Unremarkable SPLEEN:  Unremarkable PANCREAS:  Unremarkable ADRENAL:  Unremarkable KIDNEYS:  Unremarkable parenchyma with no solid mass identified. No obstruction.  No calculus identified. GASTROINTESTINAL/MESENTERY:  No evidence of obstruction nor inflammation. The appendix is normal. AORTA/IVC:  Normal caliber. RETROPERITONEUM/LYMPH NODES:  Unremarkable REPRODUCTIVE: There is a right-sided uterine fibroid. There is minimal free fluid in the pelvis, a frequent normal finding in females. No definitive etiology identified. BLADDER:  Unremarkable OSSEUS STRUCTURES:  Typical for age with no acute process identified.     Impression: 1.Multifocal pneumonia with small bilateral parapneumonic effusions. 2.Minimal nonspecific free fluid in the pelvis, a frequent normal finding in females as no etiology identified. 3.Other incidental nonemergent findings as detailed above. Electronically Signed: Reid Banerjee MD  5/22/2024 12:48 PM EDT  Workstation ID: [id number]    XR Shoulder 2+ View Bilateral    Result Date: 5/22/2024  XR SHOULDER 2+ VW BILATERAL Date of Exam: 5/22/2024 9:15 AM EDT Indication: shoulder pain Comparison: None available. Findings: No acute fracture is identified. No focal osseous abnormality is identified. Mild degenerative changes are present along both shoulders. The soft tissues are unremarkable.     Impression: 1.No acute osseous process identified. 2.Mild degenerative changes as described above. Electronically Signed: Man Puri MD  5/22/2024 9:43 AM EDT  Workstation ID: [id number]    Adult Transthoracic Echo Complete W/ Cont if Necessary Per Protocol    Result Date: 5/21/2024    Left ventricular systolic function is normal. Left ventricular ejection fraction appears to be 61 - 65%.   Mild mitral valve regurgitation is present.   Mild tricuspid valve regurgitation is present.   Estimated right ventricular systolic pressure from  tricuspid regurgitation is normal (<35 mmHg).     Invasive peripheral vascular study    Result Date: 5/20/2024  Clinical indication: 69-year-old female with history of UTI, fever/headache, concern for meningitis.  Failed prior lumbar puncture. :  Dr. Chowdary Given Procedures:  Fluoroscopic guidance lumbar puncture diagnosis Access: 18-gauge-gauge spinal needle in the thecal sac at the mid lumbar region. Contrast: none Estimated Blood Loss:  Negligible Complication:  None Apparent. Technique:  Formal written consent for the procedure was obtained from the patient's/patient's family after explaining risks to include bleeding, infection, spinal nerve/cord injury, and headache.  The lumbar region was prepped and draped in the usual, sterile fashion.  Local anesthesia with 1% lidocaine infiltration was achieved.  Utilizing fluoroscopic guidance, a 18-gauge spinal needle was placed into the thecal sac of the mid lumbar region without difficulty.  Blood-tinged CSF was encountered, with an opening pressure less than 15 cm of water in the prone position.  Approximately 10 milliliters of blood-tinged CSF was drawn off into 4 tubes without difficulty.  CSF was sent for microbiology and/or cytologic/pathologic analysis, per the referring service.  The patient tolerated the procedure well and without apparent complication.       Technically successful fluoroscopic guided lumbar puncture for diagnosis.  10 cc of blood-tinged CSF was sent for culture/labs, per the referring service.     CT Head Without Contrast    Result Date: 5/20/2024  CT HEAD WO CONTRAST Date of Exam: 5/20/2024 3:21 PM EDT Indication: HA, gen weakness. Comparison: None available. Technique: Axial CT images were obtained of the head without contrast administration.  Automated exposure control and iterative construction methods were used. Findings: Gray-white differentiation is maintained and there is no evidence of intracranial hemorrhage, mass or mass  effect. The ventricles are normal in size and configuration. The orbits are normal. The paranasal sinuses are clear. The calvarium is intact.     Impression: No acute intracranial abnormality. Electronically Signed: Ruperto Chavez MD  5/20/2024 3:34 PM EDT  Workstation ID: [id number]    XR Chest 1 View    Result Date: 5/20/2024  XR CHEST 1 VW Date of Exam: 5/20/2024 1:45 PM EDT Indication: Generalized weakness Comparison: None available. Findings: The cardiomediastinal silhouette is within normal limits. Pulmonary vascularity appears normal. There is no focal airspace consolidation, pleural effusion, or pneumothorax. There are calcified lymph nodes within the left hilar region likely related to chronic granulomatous disease. There are degenerative changes of the thoracic spine.     Impression: 1. No acute cardiopulmonary abnormality. Electronically Signed: Burke Ray  5/20/2024 2:07 PM EDT  Workstation ID: [id number]             Results for orders placed during the hospital encounter of 05/20/24    Adult Transthoracic Echo Complete W/ Cont if Necessary Per Protocol    Interpretation Summary    Left ventricular systolic function is normal. Left ventricular ejection fraction appears to be 61 - 65%.    Mild mitral valve regurgitation is present.    Mild tricuspid valve regurgitation is present.    Estimated right ventricular systolic pressure from tricuspid regurgitation is normal (<35 mmHg).      Plan for Follow-up of Pending Labs/Results: Follow up with ID in one week   Pending Labs       Order Current Status    Angiotensin Converting Enzyme In process    Histoplasma Ag Ur - Urine, Urine, Random Void In process    Blood Culture - Blood, Arm, Right Preliminary result          Discharge Details        Discharge Medications        New Medications        Instructions Start Date   amLODIPine 5 MG tablet  Commonly known as: NORVASC   5 mg, Oral, Every 24 Hours Scheduled   Start Date: May 26, 2024     cefuroxime 500 MG  tablet  Commonly known as: CEFTIN   500 mg, Oral, 2 Times Daily      doxycycline 100 MG capsule  Commonly known as: MONODOX   Take 1 capsule by mouth Every 12 (Twelve) Hours for 3 doses.      fenofibrate 145 MG tablet  Commonly known as: TRICOR   145 mg, Oral, Daily      meloxicam 7.5 MG tablet  Commonly known as: MOBIC   7.5 mg, Oral, Daily   Start Date: May 26, 2024     pantoprazole 40 MG EC tablet  Commonly known as: PROTONIX   40 mg, Oral, Every Early Morning   Start Date: May 26, 2024            Continue These Medications        Instructions Start Date   rosuvastatin 5 MG tablet  Commonly known as: CRESTOR   5 mg, Oral, Daily      vitamin B-1 50 MG tablet   50 mg, Oral, Daily             Stop These Medications      calcium carbonate 600 MG tablet  Commonly known as: OS-NEREIDA     cefdinir 300 MG capsule  Commonly known as: OMNICEF     hydroCHLOROthiazide 12.5 MG capsule  Commonly known as: MICROZIDE     ibuprofen 600 MG tablet  Commonly known as: ADVIL,MOTRIN     lisinopril 40 MG tablet  Commonly known as: PRINIVIL,ZESTRIL     vitamin C 250 MG tablet  Commonly known as: ASCORBIC ACID     vitamin E 400 UNIT capsule              No Known Allergies      Discharge Disposition:  Home or Self Care    Diet:  Hospital:  Diet Order   Procedures    Diet: Cardiac; Healthy Heart (2-3 Na+); Fluid Consistency: Thin (IDDSI 0)       Diet Instructions       Diet: Cardiac Diets; Healthy Heart (2-3 Na+); Thin (IDDSI 0)      Discharge Diet: Cardiac Diets    Cardiac Diet: Healthy Heart (2-3 Na+)    Fluid Consistency: Thin (IDDSI 0)             Activity:  Activity Instructions       Activity as Tolerated                   CODE STATUS:    Code Status and Medical Interventions:   Ordered at: 05/20/24 0944     Level Of Support Discussed With:    Patient     Code Status (Patient has no pulse and is not breathing):    CPR (Attempt to Resuscitate)     Medical Interventions (Patient has pulse or is breathing):    Full Support       Future  Appointments   Date Time Provider Department Center   6/12/2024 10:00 AM [facility] ECHO/VASC [facility]   6/12/2024 11:00 AM FR[facility] TM [facility]   7/18/2024  9:30 AM [facility]   7/29/2024  2:00 PM Dejuan Kent PA-C [facility]   10/14/2024 10:45 AM Dejuan Kent PA-C [facility]   10/17/2024 11:30 AM Josef Alvares MD [facility]       Additional Instructions for the Follow-ups that You Need to Schedule       Ambulatory Referral to Home Health   As directed      Face to Face Visit Date: 5/24/2024   Follow-up provider for Plan of Care?: I treated the patient in an acute care facility and will not continue treatment after discharge.   Follow-up provider: DEJUAN KENT [[id number]]   Reason/Clinical Findings: fever   Describe mobility limitations that make leaving home difficult: use of mobility assistance   Nursing/Therapeutic Services Requested: Skilled Nursing Physical Therapy   Skilled nursing orders: Medication education   PT orders: Strengthening   Frequency: Other (2-3 times a week)        Discharge Follow-up with PCP   As directed       Currently Documented PCP:    Dejuan Kent PA-C    PCP Phone Number:    [phone]     Follow Up Details: in one week        Discharge Follow-up with Specialty: Infectious Disease, Dr. Mandel on 05/29/2024   As directed      Specialty: Infectious DiseaseDr. Mandel on 05/29/2024   Follow Up Details: ID clinic to call patient with time                      Sweta Gracia MD  05/25/24      Time Spent on Discharge:  I spent  35  minutes on this discharge activity which included: face-to-face encounter with the patient, reviewing the data in the system, coordination of the care with the nursing staff as well as consultants, documentation, and entering orders.

## 2024-05-25 NOTE — PLAN OF CARE
Problem: Adult Inpatient Plan of Care  Goal: Plan of Care Review  Outcome: Ongoing, Progressing  Flowsheets (Taken 5/24/2024 1444 by Uri Wells, PT Student)  Progress: no change  Plan of Care Reviewed With: patient  Outcome Evaluation: PT initial eval completed. Pt presents below her functional baseline w/ decreased strength and impaired endurance. Pt ambulated 40' with CGA w/o AD. Activity limited by elevated BP. Patient on room air throughout session w/o c/o of SOB. No overt LOB or knee buckling. Further IPPT is warranted to address residual weakness and return to previous functional capacity. PT recommend d/c to home w/ assist and OPPT.  Goal: Patient-Specific Goal (Individualized)  Outcome: Ongoing, Progressing  Goal: Absence of Hospital-Acquired Illness or Injury  Outcome: Ongoing, Progressing  Intervention: Identify and Manage Fall Risk  Recent Flowsheet Documentation  Taken 5/25/2024 0600 by Iain Yeager RN  Safety Promotion/Fall Prevention:   safety round/check completed   gait belt   activity supervised  Taken 5/25/2024 0400 by Iain Yeager RN  Safety Promotion/Fall Prevention:   safety round/check completed   gait belt   activity supervised  Taken 5/25/2024 0200 by Iain Yeager RN  Safety Promotion/Fall Prevention:   safety round/check completed   gait belt   activity supervised  Taken 5/25/2024 0000 by Iain Yeager RN  Safety Promotion/Fall Prevention:   safety round/check completed   gait belt   activity supervised  Taken 5/24/2024 2200 by Iain Yeager RN  Safety Promotion/Fall Prevention:   safety round/check completed   gait belt   activity supervised  Taken 5/24/2024 2000 by Iain Yeager RN  Safety Promotion/Fall Prevention:   safety round/check completed   gait belt   activity supervised  Intervention: Prevent Skin Injury  Recent Flowsheet Documentation  Taken 5/25/2024 0600 by Iain Yeager, RN  Body Position:   position changed  independently   lower extremity elevated  Skin Protection:   adhesive use limited   incontinence pads utilized   pulse oximeter probe site changed  Taken 5/25/2024 0400 by Iain Yeager RN  Body Position:   position changed independently   lower extremity elevated  Skin Protection:   adhesive use limited   incontinence pads utilized   pulse oximeter probe site changed  Taken 5/25/2024 0200 by Iain Yeager RN  Body Position:   position changed independently   lower extremity elevated  Skin Protection:   adhesive use limited   incontinence pads utilized   pulse oximeter probe site changed  Taken 5/25/2024 0000 by Iain Yeager RN  Body Position:   position changed independently   lower extremity elevated  Skin Protection:   adhesive use limited   incontinence pads utilized   pulse oximeter probe site changed  Taken 5/24/2024 2200 by Iain Yeager RN  Body Position:   position changed independently   lower extremity elevated  Skin Protection:   adhesive use limited   incontinence pads utilized   pulse oximeter probe site changed  Taken 5/24/2024 2000 by Iain Yeager RN  Body Position:   position changed independently   lower extremity elevated  Skin Protection:   adhesive use limited   incontinence pads utilized   pulse oximeter probe site changed  Intervention: Prevent and Manage VTE (Venous Thromboembolism) Risk  Recent Flowsheet Documentation  Taken 5/25/2024 0600 by Iain Yeager, RN  Activity Management: activity encouraged  VTE Prevention/Management:   bilateral   patient refused intervention   sequential compression devices off  Taken 5/25/2024 0400 by Iain Yeager, RN  Activity Management: activity encouraged  VTE Prevention/Management:   bilateral   patient refused intervention   sequential compression devices off  Taken 5/25/2024 0200 by Iain Yeager, RN  Activity Management: activity encouraged  VTE Prevention/Management:   bilateral   patient refused  intervention   sequential compression devices off  Taken 5/25/2024 0000 by Iain Yeager RN  Activity Management: activity encouraged  VTE Prevention/Management:   bilateral   patient refused intervention   sequential compression devices off  Taken 5/24/2024 2200 by Iain Yeager RN  Activity Management: activity encouraged  VTE Prevention/Management:   bilateral   patient refused intervention   sequential compression devices off  Taken 5/24/2024 2000 by Iain Yeager RN  Activity Management: activity encouraged  VTE Prevention/Management:   bilateral   patient refused intervention   sequential compression devices off  Goal: Optimal Comfort and Wellbeing  Outcome: Ongoing, Progressing  Intervention: Provide Person-Centered Care  Recent Flowsheet Documentation  Taken 5/25/2024 0600 by Iain Yeager RN  Trust Relationship/Rapport:   care explained   choices provided   questions answered  Taken 5/25/2024 0400 by Iain Yeager RN  Trust Relationship/Rapport:   care explained   choices provided   questions answered  Taken 5/25/2024 0200 by Iain Yeager RN  Trust Relationship/Rapport:   care explained   choices provided   questions answered  Taken 5/25/2024 0000 by Iain Yeager RN  Trust Relationship/Rapport:   care explained   choices provided   questions answered  Taken 5/24/2024 2200 by Iain Yeager RN  Trust Relationship/Rapport:   care explained   choices provided   questions answered  Taken 5/24/2024 2000 by Iain Yeager RN  Trust Relationship/Rapport:   care explained   choices provided   questions answered  Goal: Readiness for Transition of Care  Outcome: Ongoing, Progressing     Problem: Fall Injury Risk  Goal: Absence of Fall and Fall-Related Injury  Outcome: Ongoing, Progressing  Intervention: Identify and Manage Contributors  Recent Flowsheet Documentation  Taken 5/25/2024 0600 by Iain Yeager RN  Medication Review/Management:  medications reviewed  Taken 5/25/2024 0400 by Iain Yeager RN  Medication Review/Management: medications reviewed  Taken 5/25/2024 0200 by Iain Yeager RN  Medication Review/Management: medications reviewed  Taken 5/25/2024 0000 by Iain Yeager RN  Medication Review/Management: medications reviewed  Taken 5/24/2024 2200 by Iain Yeager RN  Medication Review/Management: medications reviewed  Taken 5/24/2024 2000 by Iain Yeager RN  Medication Review/Management: medications reviewed  Intervention: Promote Injury-Free Environment  Recent Flowsheet Documentation  Taken 5/25/2024 0600 by Iain Yeager RN  Safety Promotion/Fall Prevention:   safety round/check completed   gait belt   activity supervised  Taken 5/25/2024 0400 by Iain Yeager RN  Safety Promotion/Fall Prevention:   safety round/check completed   gait belt   activity supervised  Taken 5/25/2024 0200 by Iain Yeager RN  Safety Promotion/Fall Prevention:   safety round/check completed   gait belt   activity supervised  Taken 5/25/2024 0000 by Iain Yeager RN  Safety Promotion/Fall Prevention:   safety round/check completed   gait belt   activity supervised  Taken 5/24/2024 2200 by Iain Yeager RN  Safety Promotion/Fall Prevention:   safety round/check completed   gait belt   activity supervised  Taken 5/24/2024 2000 by Iain Yeager RN  Safety Promotion/Fall Prevention:   safety round/check completed   gait belt   activity supervised     Problem: Adjustment to Illness (Sepsis/Septic Shock)  Goal: Optimal Coping  Outcome: Ongoing, Progressing  Intervention: Optimize Psychosocial Adjustment to Illness  Recent Flowsheet Documentation  Taken 5/25/2024 0600 by Iain Yeager, RN  Family/Support System Care: support provided  Taken 5/25/2024 0400 by Iain Yeager, RN  Family/Support System Care: support provided  Taken 5/25/2024 0200 by Iain Yeager  RN  Family/Support System Care: support provided  Taken 5/25/2024 0000 by Iain Yeager RN  Family/Support System Care: support provided  Taken 5/24/2024 2200 by Iain Yeager RN  Family/Support System Care: support provided  Taken 5/24/2024 2000 by Iain Yeager RN  Family/Support System Care: support provided     Problem: Bleeding (Sepsis/Septic Shock)  Goal: Absence of Bleeding  Outcome: Ongoing, Progressing     Problem: Glycemic Control Impaired (Sepsis/Septic Shock)  Goal: Blood Glucose Level Within Desired Range  Outcome: Ongoing, Progressing     Problem: Infection Progression (Sepsis/Septic Shock)  Goal: Absence of Infection Signs and Symptoms  Outcome: Ongoing, Progressing  Intervention: Promote Recovery  Recent Flowsheet Documentation  Taken 5/25/2024 0600 by Iain Yeager, RN  Activity Management: activity encouraged  Taken 5/25/2024 0400 by Iain Yeager, RN  Activity Management: activity encouraged  Taken 5/25/2024 0200 by Iain Yeager, RN  Activity Management: activity encouraged  Taken 5/25/2024 0000 by Iain Yeager, RN  Activity Management: activity encouraged  Taken 5/24/2024 2200 by Iain Yeager, RN  Activity Management: activity encouraged  Taken 5/24/2024 2000 by Iain Yeager, RN  Activity Management: activity encouraged     Problem: Nutrition Impaired (Sepsis/Septic Shock)  Goal: Optimal Nutrition Intake  Outcome: Ongoing, Progressing   Goal Outcome Evaluation:

## 2024-05-26 ENCOUNTER — HOME CARE VISIT (OUTPATIENT)
Dept: HOME HEALTH SERVICES | Facility: HOME HEALTHCARE | Age: 69
End: 2024-05-26
Payer: MEDICARE

## 2024-05-26 LAB
QT INTERVAL: 386 MS
QTC INTERVAL: 453 MS

## 2024-05-28 ENCOUNTER — READMISSION MANAGEMENT (OUTPATIENT)
Dept: CALL CENTER | Facility: HOSPITAL | Age: 69
End: 2024-05-28
Payer: MEDICARE

## 2024-05-28 ENCOUNTER — HOME CARE VISIT (OUTPATIENT)
Dept: HOME HEALTH SERVICES | Facility: HOME HEALTHCARE | Age: 69
End: 2024-05-28
Payer: MEDICARE

## 2024-05-28 LAB — ACE SERPL-CCNC: <15 U/L (ref 14–82)

## 2024-05-28 NOTE — OUTREACH NOTE
Medical Week 1 Survey      Flowsheet Row Responses   [facility] patient discharged from? [facility]   Does the patient have one of the following disease processes/diagnoses(primary or secondary)? Other   Week 1 attempt successful? Yes   Call start time 1704   Call end time 1710   Discharge diagnosis *Fever   Meds reviewed with patient/caregiver? Yes   Is the patient having any side effects they believe may be caused by any medication additions or changes? No   Does the patient have all medications ordered at discharge? Yes   Is the patient taking all medications as directed (includes completed medication regime)? Yes   Does the patient have a primary care provider?  Yes   Does the patient have an appointment with their PCP within 7 days of discharge? No   What is preventing the patient from scheduling follow up appointments within 7 days of discharge? Haven't had time   Nursing Interventions Advised patient to make appointment   Has the patient kept scheduled appointments due by today? N/A   Has home health visited the patient within 72 hours of discharge? N/A   Psychosocial issues? No   Did the patient receive a copy of their discharge instructions? Yes   Nursing interventions Reviewed instructions with patient   What is the patient's perception of their health status since discharge? Improving   Is the patient/caregiver able to teach back signs and symptoms related to disease process for when to call PCP? Yes   Is the patient/caregiver able to teach back signs and symptoms related to disease process for when to call 911? Yes   Is the patient/caregiver able to teach back the hierarchy of who to call/visit for symptoms/problems? PCP, Specialist, Home health nurse, Urgent Care, ED, 911 Yes   If the patient is a current smoker, are they able to teach back resources for cessation? Not a smoker   Week 1 call completed? Yes   Call end time 1710            Vanessa WARE - Registered Nurse

## 2024-05-29 LAB — H CAPSUL AG UR QL IA: NEGATIVE

## 2024-05-30 ENCOUNTER — OFFICE VISIT (OUTPATIENT)
Dept: FAMILY MEDICINE CLINIC | Facility: CLINIC | Age: 69
End: 2024-05-30
Payer: MEDICARE

## 2024-05-30 VITALS
TEMPERATURE: 97.8 F | HEIGHT: 62 IN | HEART RATE: 98 BPM | SYSTOLIC BLOOD PRESSURE: 130 MMHG | WEIGHT: 116 LBS | BODY MASS INDEX: 21.35 KG/M2 | DIASTOLIC BLOOD PRESSURE: 80 MMHG | OXYGEN SATURATION: 98 %

## 2024-05-30 DIAGNOSIS — R65.10 SIRS (SYSTEMIC INFLAMMATORY RESPONSE SYNDROME): Primary | ICD-10-CM

## 2024-05-30 DIAGNOSIS — E87.6 HYPOKALEMIA: ICD-10-CM

## 2024-05-30 DIAGNOSIS — I10 HYPERTENSION, ESSENTIAL: ICD-10-CM

## 2024-05-30 DIAGNOSIS — D64.9 ANEMIA, UNSPECIFIED TYPE: ICD-10-CM

## 2024-05-30 NOTE — PROGRESS NOTES
"Transitional Care Follow Up Visit  Subjective     Linda Good is a 69 y.o. female who presents for a transitional care management visit.    Patient in today for hospital f/up. Was admitted on 5/20/2024 and discharged on 5/25/2024 with diagnosis of SIRS, leukocytosis, acute kidney injury, headache, fever, and bandemia. Was felt may have had reactive arthritis with joint pains- given short course of meloxicam. Was also diagnosed with multifocal pneumonia that was noted on imaging and treated with antibiotics. She states is feeling better but still with joint pains. She saw infectious disease yesterday and states they felt encouraged that this was improving. She states they did draw labs for lyme disease panel. On day of discharge, her potassium was low at 3.2 and she states was given oral potassium and due to have rechecked.  Also had mild decrease to hemoglobin at 10.2.  Denies any fever. No vomiting or diarrhea. She does have f/up with cardiology in 4 days. Denies chest pain or shortness of breath. BP has been doing well- was switched to amlodipine due to kidney concerns.       Within 48 business hours after discharge our office contacted her via telephone to coordinate her care and needs.      I reviewed and discussed the details of that call along with the discharge summary, hospital problems, inpatient lab results, inpatient diagnostic studies, and consultation reports with Linda.     Current outpatient and discharge medications have been reconciled for the patient.  Reviewed by: Julissa Vernon PA-C           No data to display              Risk for Readmission (LACE) Score: 7 (5/25/2024  6:00 AM)      History of Present Illness  Please see above.       Objective   Vital Signs:   /80   Pulse 98   Temp 97.8 °F (36.6 °C)   Ht 157.5 cm (62\")   Wt 52.6 kg (116 lb)   SpO2 98%   BMI 21.22 kg/m²     Body mass index is 21.22 kg/m².    Review of Systems   Constitutional:  Negative for fever.   HENT:  " Negative for congestion.    Respiratory:  Negative for shortness of breath and wheezing.    Cardiovascular:  Negative for chest pain.   Gastrointestinal:  Negative for abdominal pain, blood in stool, diarrhea, nausea and vomiting.   Genitourinary:  Negative for dysuria and frequency.   Musculoskeletal:  Positive for arthralgias and joint swelling.   Neurological:  Negative for dizziness.       Past History:  Medical History: has a past medical history of Diabetes mellitus (3/1/1993), Hyperlipidemia, Hypertension, Pneumonia (5/23/24), and Pregnancy.   Surgical History: has a past surgical history that includes Breast surgery (1997); Tubal ligation (1993); Colonoscopy; and Interventional radiology procedure (N/A, 5/20/2024).   Family History: family history includes Cancer in her father; Coronary artery disease in her sister; Diabetes in her mother; Heart disease in her mother; Lung cancer in her father; Other in her mother.   Social History: reports that she quit smoking about 22 years ago. Her smoking use included cigarettes. She started smoking about 49 years ago. She has a 13.5 pack-year smoking history. She has never used smokeless tobacco. She reports current alcohol use of about 10.0 standard drinks of alcohol per week. She reports that she does not use drugs.      Current Outpatient Medications:     amLODIPine (NORVASC) 5 MG tablet, Take 1 tablet by mouth Daily for 60 days., Disp: 60 tablet, Rfl: 0    fenofibrate (TRICOR) 145 MG tablet, Take 1 tablet by mouth Daily., Disp: 90 tablet, Rfl: 3    meloxicam (MOBIC) 7.5 MG tablet, Take 1 tablet by mouth Daily for 6 days., Disp: 6 tablet, Rfl: 0    pantoprazole (PROTONIX) 40 MG EC tablet, Take 1 tablet by mouth Every Morning for 6 days., Disp: 6 tablet, Rfl: 0    rosuvastatin (CRESTOR) 5 MG tablet, Take 1 tablet by mouth Daily., Disp: 90 tablet, Rfl: 0    Thiamine HCl (vitamin B-1) 50 MG tablet, Take 1 tablet by mouth Daily., Disp: , Rfl:     Allergies: Patient has  no known allergies.    Physical Exam  Constitutional:       Appearance: Normal appearance.   HENT:      Right Ear: Tympanic membrane normal.      Left Ear: Tympanic membrane normal.      Nose: Nose normal.      Mouth/Throat:      Mouth: Mucous membranes are moist.   Eyes:      Pupils: Pupils are equal, round, and reactive to light.   Cardiovascular:      Rate and Rhythm: Normal rate and regular rhythm.      Heart sounds: Normal heart sounds.   Pulmonary:      Effort: Pulmonary effort is normal.      Breath sounds: Normal breath sounds.   Abdominal:      Palpations: Abdomen is soft.      Tenderness: There is no abdominal tenderness.   Neurological:      General: No focal deficit present.      Mental Status: She is alert and oriented to person, place, and time.      Cranial Nerves: No cranial nerve deficit.   Psychiatric:         Mood and Affect: Mood normal.         Behavior: Behavior normal.          Result Review :   The following data was reviewed by: Julissa Vernon PA-C on 05/30/2024:  Common labs          5/23/2024    05:00 5/23/2024    14:00 5/24/2024    08:05 5/25/2024    04:47   Common Labs   Glucose 79  118  80  84    BUN 8  7  8  10    Creatinine 0.48  0.56  0.54  0.53    Sodium 137  139  141  140    Potassium 3.6  3.5  3.9  3.2    Chloride 101  105  103  104    Calcium 7.8  8.4  8.6  8.2    WBC 12.15   10.99  9.99    Hemoglobin 10.8   11.6  10.2    Hematocrit 33.1   35.8  31.5    Platelets 526   566  525      Data reviewed : Radiologic studies along with hospital discharge summary and recommendations.              Assessment and Plan    Diagnoses and all orders for this visit:    1. SIRS (systemic inflammatory response syndrome) (Primary)  Encouraged patient to continue to closely monitor symptoms and to ER for any return of symptoms. Continue f/up with infectious disease as directed and keep f/up with cardiology next week.   2. Anemia, unspecified type  -     CBC & Differential  Will repeat cbc today  with labs.   3. Hypokalemia  -     Comprehensive Metabolic Panel  Will recheck CMP today.   4. Hypertension, essential  Continue to monitor blood pressure and rtc prior to recheck as needed.       Follow Up   No follow-ups on file.  Patient was given instructions and counseling regarding her condition or for health maintenance advice. Please see specific information pulled into the AVS if appropriate.     Julissa Vernon PA-C

## 2024-05-31 ENCOUNTER — TELEPHONE (OUTPATIENT)
Dept: FAMILY MEDICINE CLINIC | Facility: CLINIC | Age: 69
End: 2024-05-31
Payer: MEDICARE

## 2024-05-31 LAB
ALBUMIN SERPL-MCNC: 4.6 G/DL (ref 3.9–4.9)
ALBUMIN/GLOB SERPL: 1.7 {RATIO} (ref 1.2–2.2)
ALP SERPL-CCNC: 164 IU/L (ref 44–121)
ALT SERPL-CCNC: 46 IU/L (ref 0–32)
AST SERPL-CCNC: 40 IU/L (ref 0–40)
BASOPHILS # BLD AUTO: 0.1 X10E3/UL (ref 0–0.2)
BASOPHILS NFR BLD AUTO: 1 %
BILIRUB SERPL-MCNC: 0.2 MG/DL (ref 0–1.2)
BUN SERPL-MCNC: 21 MG/DL (ref 8–27)
BUN/CREAT SERPL: 28 (ref 12–28)
CALCIUM SERPL-MCNC: 10.4 MG/DL (ref 8.7–10.3)
CHLORIDE SERPL-SCNC: 101 MMOL/L (ref 96–106)
CO2 SERPL-SCNC: 21 MMOL/L (ref 20–29)
CREAT SERPL-MCNC: 0.76 MG/DL (ref 0.57–1)
EGFRCR SERPLBLD CKD-EPI 2021: 86 ML/MIN/1.73
EOSINOPHIL # BLD AUTO: 0.5 X10E3/UL (ref 0–0.4)
EOSINOPHIL NFR BLD AUTO: 4 %
ERYTHROCYTE [DISTWIDTH] IN BLOOD BY AUTOMATED COUNT: 12.6 % (ref 11.7–15.4)
GLOBULIN SER CALC-MCNC: 2.7 G/DL (ref 1.5–4.5)
GLUCOSE SERPL-MCNC: 85 MG/DL (ref 70–99)
HCT VFR BLD AUTO: 38.1 % (ref 34–46.6)
HGB BLD-MCNC: 12.8 G/DL (ref 11.1–15.9)
IMM GRANULOCYTES # BLD AUTO: 0.2 X10E3/UL (ref 0–0.1)
IMM GRANULOCYTES NFR BLD AUTO: 1 %
LYMPHOCYTES # BLD AUTO: 2.2 X10E3/UL (ref 0.7–3.1)
LYMPHOCYTES NFR BLD AUTO: 16 %
MCH RBC QN AUTO: 32.2 PG (ref 26.6–33)
MCHC RBC AUTO-ENTMCNC: 33.6 G/DL (ref 31.5–35.7)
MCV RBC AUTO: 96 FL (ref 79–97)
MONOCYTES # BLD AUTO: 0.9 X10E3/UL (ref 0.1–0.9)
MONOCYTES NFR BLD AUTO: 7 %
NEUTROPHILS # BLD AUTO: 9.8 X10E3/UL (ref 1.4–7)
NEUTROPHILS NFR BLD AUTO: 71 %
PLATELET # BLD AUTO: 785 X10E3/UL (ref 150–450)
POTASSIUM SERPL-SCNC: 5.1 MMOL/L (ref 3.5–5.2)
PROT SERPL-MCNC: 7.3 G/DL (ref 6–8.5)
RBC # BLD AUTO: 3.98 X10E6/UL (ref 3.77–5.28)
SODIUM SERPL-SCNC: 140 MMOL/L (ref 134–144)
WBC # BLD AUTO: 13.7 X10E3/UL (ref 3.4–10.8)

## 2024-05-31 NOTE — TELEPHONE ENCOUNTER
Caller: Linda Good    Relationship: Self    Best call back number: [phone]    What was the call regarding: PATIENT CALLED WANTING TO MAKE SOMEONE AWARE THAT HER WHITE BLOOD CELLS ARE ELEVATED. PATIENT WOULD LIKE FOR SOMEONE TO FOLLOW UP WITH HER ON WHAT SHE SHOULD DO.

## 2024-05-31 NOTE — TELEPHONE ENCOUNTER
Caller: Linda Good    Relationship: Self    Best call back number: [phone]  Who are you requesting to speak with (clinical staff, provider,  specific staff member): NURSE    What was the call regarding: PAIN THAT PT IS EXPERIENCING THROUGHOUT THE NIGHT

## 2024-05-31 NOTE — TELEPHONE ENCOUNTER
Spoke with Dr. Anthony about labs. Advised that is pt is having symptoms of infection to go to ER. Pt states its not acute symptoms its the same pains he had when she was in the hospital. Pt was in tears advised pt to go get evaluated pt verbalized understanding.

## 2024-05-31 NOTE — TELEPHONE ENCOUNTER
Called pt aware if any signs or symptoms of infection/pain gets worse to go to ER. Pt scheduled appt with toro Monday per crystal to check WBC.

## 2024-06-03 ENCOUNTER — TELEPHONE (OUTPATIENT)
Dept: FAMILY MEDICINE CLINIC | Facility: CLINIC | Age: 69
End: 2024-06-03

## 2024-06-03 ENCOUNTER — OFFICE VISIT (OUTPATIENT)
Dept: FAMILY MEDICINE CLINIC | Facility: CLINIC | Age: 69
End: 2024-06-03
Payer: MEDICARE

## 2024-06-03 VITALS
DIASTOLIC BLOOD PRESSURE: 58 MMHG | BODY MASS INDEX: 20.98 KG/M2 | SYSTOLIC BLOOD PRESSURE: 122 MMHG | OXYGEN SATURATION: 98 % | HEART RATE: 96 BPM | HEIGHT: 62 IN | TEMPERATURE: 98.4 F | WEIGHT: 114 LBS

## 2024-06-03 DIAGNOSIS — D72.829 LEUKOCYTOSIS, UNSPECIFIED TYPE: Primary | ICD-10-CM

## 2024-06-03 DIAGNOSIS — B37.9 YEAST INFECTION: ICD-10-CM

## 2024-06-03 PROCEDURE — 3078F DIAST BP <80 MM HG: CPT | Performed by: NURSE PRACTITIONER

## 2024-06-03 PROCEDURE — 3044F HG A1C LEVEL LT 7.0%: CPT | Performed by: NURSE PRACTITIONER

## 2024-06-03 PROCEDURE — 1126F AMNT PAIN NOTED NONE PRSNT: CPT | Performed by: NURSE PRACTITIONER

## 2024-06-03 PROCEDURE — 1160F RVW MEDS BY RX/DR IN RCRD: CPT | Performed by: NURSE PRACTITIONER

## 2024-06-03 PROCEDURE — 1159F MED LIST DOCD IN RCRD: CPT | Performed by: NURSE PRACTITIONER

## 2024-06-03 PROCEDURE — 99214 OFFICE O/P EST MOD 30 MIN: CPT | Performed by: NURSE PRACTITIONER

## 2024-06-03 PROCEDURE — 3074F SYST BP LT 130 MM HG: CPT | Performed by: NURSE PRACTITIONER

## 2024-06-03 RX ORDER — FLUCONAZOLE 150 MG/1
150 TABLET ORAL
Qty: 2 TABLET | Refills: 0 | Status: SHIPPED | OUTPATIENT
Start: 2024-06-03

## 2024-06-03 NOTE — PROGRESS NOTES
"Chief Complaint  Follow-up (Follow up on WBC )    Subjective          Linda Good presents to [facility]  History of Present Illness  Pt is here to follow up on her recent hospitalization. She was diagnosed with systemic inflammatory response syndrome and pneumonia. She had follow up labs done last week and her WBC had elevated once again. She feels fatigued, lightheaded, near syncopal, and headaches. She has tenderness in her arms and hands. She has a yeast infection.   Extremity Pain   The pain is present in the left shoulder, left hand, left fingers, left arm, right shoulder, right arm, right hand and right fingers. The problem has been comes and goes. The quality of the pain is described as aching. The pain is at a severity of 8/10. Associated symptoms include difficulty holding things. Pertinent negatives include no fever, numbness, tingling, upper extremity swelling or redness.       Objective   Vital Signs:   /58   Pulse 96   Temp 98.4 °F (36.9 °C)   Ht 157.5 cm (62\")   Wt 51.7 kg (114 lb)   SpO2 98%   BMI 20.85 kg/m²     Body mass index is 20.85 kg/m².    Review of Systems   Constitutional:  Positive for fatigue. Negative for fever.   HENT:  Negative for congestion.    Respiratory:  Negative for cough and shortness of breath.    Cardiovascular:  Negative for chest pain, palpitations and leg swelling.   Gastrointestinal:  Negative for abdominal pain, diarrhea and vomiting.   Genitourinary:  Negative for dysuria.   Musculoskeletal:  Positive for arthralgias.   Neurological:  Negative for tingling and numbness.          Current Outpatient Medications:     amLODIPine (NORVASC) 5 MG tablet, Take 1 tablet by mouth Daily for 60 days., Disp: 60 tablet, Rfl: 0    fenofibrate (TRICOR) 145 MG tablet, Take 1 tablet by mouth Daily., Disp: 90 tablet, Rfl: 3    rosuvastatin (CRESTOR) 5 MG tablet, Take 1 tablet by mouth Daily., Disp: 90 tablet, Rfl: 0    Thiamine HCl (vitamin " B-1) 50 MG tablet, Take 1 tablet by mouth Daily., Disp: , Rfl:     fluconazole (Diflucan) 150 MG tablet, Take 1 tablet by mouth Every 3 (Three) Days., Disp: 2 tablet, Rfl: 0      Allergies: Patient has no known allergies.    Physical Exam  Constitutional:       Appearance: Normal appearance.   HENT:      Head: Normocephalic.   Eyes:      Conjunctiva/sclera: Conjunctivae normal.      Pupils: Pupils are equal, round, and reactive to light.   Cardiovascular:      Rate and Rhythm: Normal rate and regular rhythm.      Heart sounds: Normal heart sounds.   Pulmonary:      Effort: Pulmonary effort is normal.      Breath sounds: Normal breath sounds.   Abdominal:      Tenderness: There is no abdominal tenderness.   Musculoskeletal:         General: Normal range of motion.   Skin:     General: Skin is warm and dry.      Capillary Refill: Capillary refill takes less than 2 seconds.   Neurological:      General: No focal deficit present.      Mental Status: She is alert and oriented to person, place, and time.   Psychiatric:         Mood and Affect: Mood normal.         Behavior: Behavior normal.         Thought Content: Thought content normal.         Judgment: Judgment normal.          Result Review :                   Assessment and Plan    Diagnoses and all orders for this visit:    1. Leukocytosis, unspecified type (Primary)  Comments:  Labs and CXR repeated. Call ID today and schedule F/U. Keep appt with cardio on Wednesday. Return to ER for worsened sx.  Orders:  -     XR Chest PA & Lateral; Future  -     CBC & Differential  -     Comprehensive Metabolic Panel    2. Yeast infection  Comments:  Diflucan PRN.  Orders:  -     fluconazole (Diflucan) 150 MG tablet; Take 1 tablet by mouth Every 3 (Three) Days.  Dispense: 2 tablet; Refill: 0                Follow Up   Return in about 1 week (around 6/10/2024) for if not improving or sooner if symptoms worsen.  Patient was given instructions and counseling regarding her  condition or for health maintenance advice. Please see specific information pulled into the AVS if appropriate.     OTTONIEL Hall

## 2024-06-04 ENCOUNTER — TELEPHONE (OUTPATIENT)
Dept: FAMILY MEDICINE CLINIC | Facility: CLINIC | Age: 69
End: 2024-06-04
Payer: MEDICARE

## 2024-06-04 DIAGNOSIS — R65.10 SIRS (SYSTEMIC INFLAMMATORY RESPONSE SYNDROME): Primary | ICD-10-CM

## 2024-06-04 LAB
ALBUMIN SERPL-MCNC: 4.7 G/DL (ref 3.9–4.9)
ALBUMIN/GLOB SERPL: 1.7 {RATIO} (ref 1.2–2.2)
ALP SERPL-CCNC: 123 IU/L (ref 44–121)
ALT SERPL-CCNC: 39 IU/L (ref 0–32)
AST SERPL-CCNC: 42 IU/L (ref 0–40)
BASOPHILS # BLD AUTO: 0.1 X10E3/UL (ref 0–0.2)
BASOPHILS NFR BLD AUTO: 1 %
BILIRUB SERPL-MCNC: 0.3 MG/DL (ref 0–1.2)
BUN SERPL-MCNC: 25 MG/DL (ref 8–27)
BUN/CREAT SERPL: 29 (ref 12–28)
CALCIUM SERPL-MCNC: 11 MG/DL (ref 8.7–10.3)
CHLORIDE SERPL-SCNC: 102 MMOL/L (ref 96–106)
CO2 SERPL-SCNC: 22 MMOL/L (ref 20–29)
CREAT SERPL-MCNC: 0.85 MG/DL (ref 0.57–1)
EGFRCR SERPLBLD CKD-EPI 2021: 75 ML/MIN/1.73
EOSINOPHIL # BLD AUTO: 0.5 X10E3/UL (ref 0–0.4)
EOSINOPHIL NFR BLD AUTO: 7 %
ERYTHROCYTE [DISTWIDTH] IN BLOOD BY AUTOMATED COUNT: 12.6 % (ref 11.7–15.4)
GLOBULIN SER CALC-MCNC: 2.7 G/DL (ref 1.5–4.5)
GLUCOSE SERPL-MCNC: 104 MG/DL (ref 70–99)
HCT VFR BLD AUTO: 39.1 % (ref 34–46.6)
HGB BLD-MCNC: 12.8 G/DL (ref 11.1–15.9)
IMM GRANULOCYTES # BLD AUTO: 0.1 X10E3/UL (ref 0–0.1)
IMM GRANULOCYTES NFR BLD AUTO: 1 %
LYMPHOCYTES # BLD AUTO: 2.2 X10E3/UL (ref 0.7–3.1)
LYMPHOCYTES NFR BLD AUTO: 31 %
MCH RBC QN AUTO: 31.1 PG (ref 26.6–33)
MCHC RBC AUTO-ENTMCNC: 32.7 G/DL (ref 31.5–35.7)
MCV RBC AUTO: 95 FL (ref 79–97)
MONOCYTES # BLD AUTO: 0.6 X10E3/UL (ref 0.1–0.9)
MONOCYTES NFR BLD AUTO: 9 %
NEUTROPHILS # BLD AUTO: 3.8 X10E3/UL (ref 1.4–7)
NEUTROPHILS NFR BLD AUTO: 51 %
PLATELET # BLD AUTO: 749 X10E3/UL (ref 150–450)
POTASSIUM SERPL-SCNC: 5.1 MMOL/L (ref 3.5–5.2)
PROT SERPL-MCNC: 7.4 G/DL (ref 6–8.5)
RBC # BLD AUTO: 4.12 X10E6/UL (ref 3.77–5.28)
SODIUM SERPL-SCNC: 139 MMOL/L (ref 134–144)
WBC # BLD AUTO: 7.2 X10E3/UL (ref 3.4–10.8)

## 2024-06-04 NOTE — TELEPHONE ENCOUNTER
Liv irvin actually ordered, not mason amezcua. Crystal, can you look at the labs and xray please? I went ahead and called patient since she called twice to let her know that her labs may not show on mychart until you review them since some values are flagged as abnormal (as she already knew they would be) and we would call her once you took a look.

## 2024-06-04 NOTE — TELEPHONE ENCOUNTER
PT CALLED BACK IN TO CHECK ON STATUS OF CALLBACK. PLEASE RELEASE TO CHART WHEN AVAILABLE.  [phone]

## 2024-06-04 NOTE — TELEPHONE ENCOUNTER
Caller: Linda Good    Relationship: Self    Best call back number: [phone]     Caller requesting test results: LABS     What test was performed: LABS     When was the test performed: [facility] 3    Where was the test performed: OFFICE     Additional notes: PLEASE CALL WITH RESULTS OF LABS

## 2024-06-05 ENCOUNTER — OFFICE VISIT (OUTPATIENT)
Dept: CARDIOLOGY | Facility: CLINIC | Age: 69
End: 2024-06-05
Payer: MEDICARE

## 2024-06-05 VITALS
BODY MASS INDEX: 21.03 KG/M2 | OXYGEN SATURATION: 99 % | SYSTOLIC BLOOD PRESSURE: 124 MMHG | DIASTOLIC BLOOD PRESSURE: 64 MMHG | TEMPERATURE: 97.3 F | HEART RATE: 97 BPM | WEIGHT: 115 LBS

## 2024-06-05 DIAGNOSIS — E83.52 HYPERCALCEMIA: ICD-10-CM

## 2024-06-05 DIAGNOSIS — I10 PRIMARY HYPERTENSION: Primary | ICD-10-CM

## 2024-06-05 DIAGNOSIS — E78.2 MIXED HYPERLIPIDEMIA: ICD-10-CM

## 2024-06-05 DIAGNOSIS — R00.2 PALPITATIONS: ICD-10-CM

## 2024-06-05 DIAGNOSIS — R07.9 CHEST PAIN, UNSPECIFIED TYPE: ICD-10-CM

## 2024-06-05 PROCEDURE — 3078F DIAST BP <80 MM HG: CPT | Performed by: INTERNAL MEDICINE

## 2024-06-05 PROCEDURE — 99214 OFFICE O/P EST MOD 30 MIN: CPT | Performed by: INTERNAL MEDICINE

## 2024-06-05 PROCEDURE — 3074F SYST BP LT 130 MM HG: CPT | Performed by: INTERNAL MEDICINE

## 2024-06-05 RX ORDER — METOPROLOL SUCCINATE 25 MG/1
25 TABLET, EXTENDED RELEASE ORAL DAILY
Qty: 90 TABLET | Refills: 3 | Status: SHIPPED | OUTPATIENT
Start: 2024-06-05

## 2024-06-05 RX ORDER — AMLODIPINE BESYLATE 5 MG/1
2.5 TABLET ORAL
Qty: 30 TABLET | Refills: 0 | Status: SHIPPED | OUTPATIENT
Start: 2024-06-05 | End: 2024-08-04

## 2024-06-05 RX ORDER — ROSUVASTATIN CALCIUM 5 MG/1
TABLET, COATED ORAL
Qty: 90 TABLET | Refills: 0 | Status: SHIPPED | OUTPATIENT
Start: 2024-06-05

## 2024-06-05 NOTE — ASSESSMENT & PLAN NOTE
Past event of chest pain. Patient with risk factors: Age for gender, mixed hyperlipidemia, uncontrolled hypertension, past smoking.  Awaiting treadmill stress test, on hold for now in view of weakness/muscle pain/overall poor functional status after hospital admission.  Please schedule stress test after recovery.  If recurrence of chest pains in the meantime, consider Lexiscan nuclear stress test.

## 2024-06-05 NOTE — ASSESSMENT & PLAN NOTE
Hypertension is borderline  Medication changes per orders.  Dietary sodium restriction.  Regular aerobic exercise.  Ambulatory blood pressure monitoring.  Decrease amlodipine to 2.5 mg p.o. daily and add metoprolol succinate ER 25 mg p.o. daily in view of normal blood pressure and fast heart rate.  Blood pressure will be reassessedin 3 months.

## 2024-06-05 NOTE — ASSESSMENT & PLAN NOTE
Lipid abnormalities are improving on medical therapy.     Plan:  Hold rosuvastatin and continue fenofibrate 145 mg p.o. daily, in view of frailty, recent hospital admission, and diffuse myalgia.       Patient counseled in regards to heart healthy, low fat/ low cholesterol/low sat diet, daily exercise for 30 minutes, low to moderate intensity, and weight loss.     Patient Treatment Goals:   LDL goal is less than 70  Triglycerides goal less than 200     Followup in 3 months.

## 2024-06-05 NOTE — TELEPHONE ENCOUNTER
I called pt to discuss labs. She is beginning to feel better. Recheck labs in 1 week and keep appt with ID.

## 2024-06-05 NOTE — PATIENT INSTRUCTIONS
Hold rosuvastatin  Start Metoprolol Succinate ER 25 mg 1 tab daily (1/2 tab for 2 days to get used to it)  Decrease amlodipine 5 mg to 1/2 tab daily

## 2024-06-05 NOTE — ASSESSMENT & PLAN NOTE
Infrequent. Lasting for hours, regular suggesting SVT.  Holter monitor 48 hours showing atrial tachycardia, not reported as symptomatic.  Transthoracic echocardiogram with normal ejection fraction mild MR and age-appropriate diastolic function after imaging review. Plan:  Verification of hypercalcemia by sending ionized calcium  Start low-dose beta-blockers for arrhythmia prevention  Lower amlodipine to allow for room blood pressure  Keep magnesium more than 2 and potassium more than 4

## 2024-06-05 NOTE — PROGRESS NOTES
[facility]  [facility]  [address] [place] [address]  Dept: [phone]  Dept Fax: [phone]    Date: 06/05/2024  Patient: Linda Good  YOB: 1955    Follow Up Office Visit Note    Interval Follow-up  Ms. Linda Good is a 69 y.o. female who is here for follow-up on Hospital Follow Up Visit.    Subjective   Patient is not doing well today, with severe pains in the upper extremities, lower extremities and head, controlled by Tylenol around-the-clock.  Patient was admitted recently to the hospital with an undiagnosed infectious process, leading to prolonged hospital stay, and negative infectious workup.    Patient denies angina, orthopnea, PND, palpitations, lightheadedness, syncope or medications side-effects.    The following portions of the patient's history were reviewed and updated as appropriate: allergies, current medications, past family history, past medical history, past social history, past surgical history, and problem list.    Medications: No Known Allergies   Current Outpatient Medications   Medication Instructions    amLODIPine (NORVASC) 2.5 mg, Oral, Every 24 Hours Scheduled    fenofibrate (TRICOR) 145 mg, Oral, Daily    fluconazole (DIFLUCAN) 150 mg, Oral, Every 3 Days    metoprolol succinate XL (TOPROL-XL) 25 mg, Oral, Daily    rosuvastatin (CRESTOR) 5 MG tablet Hold    vitamin B-1 50 mg, Oral, Daily       Tobacco Use: Medium Risk (6/5/2024)    Patient History     Smoking Tobacco Use: Former     Smokeless Tobacco Use: Never     Passive Exposure: Not on file        Objective  Vitals:    06/05/24 1517   BP: 124/64   BP Location: Right arm   Patient Position: Sitting   Cuff Size: Adult   Pulse: 97   Temp: 97.3 °F (36.3 °C)   TempSrc: Temporal   SpO2: 99%   Weight: 52.2 kg (115 lb)      Vitals:    06/05/24 1517   BP: 124/64   BP Location: Right arm   Patient Position: Sitting   Cuff Size: Adult   Pulse: 97   Temp: 97.3 °F (36.3 °C)  "  TempSrc: Temporal   SpO2: 99%   Weight: 52.2 kg (115 lb)          Physical Exam  Constitutional:       Appearance: Not in distress.   Neck:      Vascular: JVD normal.   Pulmonary:      Breath sounds: Normal breath sounds.   Cardiovascular:      Normal rate. Regular rhythm.      Murmurs: There is no murmur.   Pulses:     Intact distal pulses.   Edema:     Peripheral edema absent.   Neurological:      Mental Status: Alert.              Diagnostic Data  Lab Results   Component Value Date     06/03/2024    K 5.1 06/03/2024     06/03/2024    CO2 22 06/03/2024    MG 2.0 05/25/2024    BUN 25 06/03/2024    CREATININE 0.85 06/03/2024    CALCIUM 11.0 (H) 06/03/2024    BILITOT 0.3 06/03/2024    ALKPHOS 123 (H) 06/03/2024    ALT 39 (H) 06/03/2024    AST 42 (H) 06/03/2024    GLUCOSE 104 (H) 06/03/2024    ALBUMIN 4.7 06/03/2024     Lab Results   Component Value Date    WBC 7.2 06/03/2024    HGB 12.8 06/03/2024    HCT 39.1 06/03/2024     (H) 06/03/2024     No results found for: \"APTT\", \"INR\", \"PTT\"  Lab Results   Component Value Date    CKTOTAL 120 05/21/2024     No results found for: \"BNP\", \"PROBNP\"    Lab Results   Component Value Date    CHLPL 174 04/11/2024    TRIG 519 (H) 04/11/2024    HDL 64 04/11/2024    LDL 38 04/11/2024     Lab Results   Component Value Date    TSH 2.020 05/20/2024    FREET4 1.25 05/20/2024       CV Diagnostics:  Procedures    CXR: Results for orders placed in visit on 06/03/24    XR Chest PA & Lateral    Narrative  XR CHEST PA AND LATERAL    Date of Exam: 6/3/2024 11:30 AM EDT    Indication: F/U on pneumonia from 5/22, leukocytosis    Comparison: 5/24/2024.    Findings:  Aeration is significantly improved from comparison without persistent focal opacity. There is no effusion or pneumothorax. Normal heart and mediastinal contours.    Impression  Impression:  Improved aeration and resolution of prior opacities, without current evidence of acute disease.      Electronically Signed: Ruperto" MD Scott  6/3/2024 4:08 PM EDT  Workstation ID: [id number]     ECHO/MUGA: Results for orders placed during the hospital encounter of 05/20/24    Adult Transthoracic Echo Complete W/ Cont if Necessary Per Protocol    Interpretation Summary    Left ventricular systolic function is normal. Left ventricular ejection fraction appears to be 61 - 65%.    Mild mitral valve regurgitation is present.    Mild tricuspid valve regurgitation is present.    Estimated right ventricular systolic pressure from tricuspid regurgitation is normal (<35 mmHg).     STRESS TESTS: No results found for this or any previous visit.     CARDIAC CATH: No results found for this or any previous visit.     DEVICES: No valid procedures specified.   HOLTER: Results for orders placed in visit on 04/22/24    Holter Monitor - 48 Hour    Interpretation Summary    An abnormal monitor study.    No events reported by patient button press/no diary provided.    There were 2 episodes of atrial tachycardia recorded, longest/fastest 8 beats [average heart rate 136 bpm, max heart rate 171 bpm], likely asymptomatic since not recorded by button press/on event log.    There was no atrial fibrillation, VT, cardiac pauses or heart blocks detected.     CT/MRI:  Results for orders placed during the hospital encounter of 05/20/24    CT Chest Without Contrast Diagnostic    Narrative  CT CHEST WO CONTRAST DIAGNOSTIC, CT ABDOMEN PELVIS W CONTRAST    Date of Exam: 5/22/2024 12:11 PM EDT    Indication: cough. Abdominal pain    Comparison: None available.    Technique: Axial CT images were obtained of the chest without contrast administration. Axial CT images were obtained of the abdomen and pelvis after intravenous administration of 75 cc Isovue-300. Reconstructed coronal and sagittal images were also  obtained. Automated exposure control and iterative construction methods were used.      Findings:    CT CHEST:  MEDIASTINUM: Unremarkable. Aortic and heart size are normal. No  mass nor pericardial effusion.  CORONARY ARTERIES: No calcified atherosclerotic disease.  LUNGS: There is nodular and more confluent airspace disease within the lower lungs bilaterally, left more so than right. There is a degree of interlobular septal thickening/interstitial edema. Imaging features are most suggestive of multifocal pneumonia.  No single suspicious nodule is identified.  PLEURAL SPACE: There are small bilateral pleural effusions.        CT ABDOMEN AND PELVIS:  LIVER:  Unremarkable parenchyma without focal lesion.  BILIARY/GALLBLADDER:  Unremarkable  SPLEEN:  Unremarkable  PANCREAS:  Unremarkable  ADRENAL:  Unremarkable  KIDNEYS:  Unremarkable parenchyma with no solid mass identified. No obstruction.  No calculus identified.  GASTROINTESTINAL/MESENTERY:  No evidence of obstruction nor inflammation. The appendix is normal.  AORTA/IVC:  Normal caliber.    RETROPERITONEUM/LYMPH NODES:  Unremarkable    REPRODUCTIVE: There is a right-sided uterine fibroid. There is minimal free fluid in the pelvis, a frequent normal finding in females. No definitive etiology identified.  BLADDER:  Unremarkable    OSSEUS STRUCTURES:  Typical for age with no acute process identified.    Impression  Impression:  1.Multifocal pneumonia with small bilateral parapneumonic effusions.  2.Minimal nonspecific free fluid in the pelvis, a frequent normal finding in females as no etiology identified.  3.Other incidental nonemergent findings as detailed above.        Electronically Signed: Reid Banerjee MD  5/22/2024 12:48 PM EDT  Workstation ID: [id number]    VASCULAR: No valid procedures specified.     Assessment and Plan  Diagnoses and all orders for this visit:    1. Primary hypertension (Primary)  Assessment & Plan:  Hypertension is borderline  Medication changes per orders.  Dietary sodium restriction.  Regular aerobic exercise.  Ambulatory blood pressure monitoring.  Decrease amlodipine to 2.5 mg p.o. daily and add metoprolol  succinate ER 25 mg p.o. daily in view of normal blood pressure and fast heart rate.  Blood pressure will be reassessedin 3 months.      2. Mixed hyperlipidemia  Assessment & Plan:  Lipid abnormalities are improving on medical therapy.     Plan:  Hold rosuvastatin and continue fenofibrate 145 mg p.o. daily, in view of frailty, recent hospital admission, and diffuse myalgia.       Patient counseled in regards to heart healthy, low fat/ low cholesterol/low sat diet, daily exercise for 30 minutes, low to moderate intensity, and weight loss.     Patient Treatment Goals:   LDL goal is less than 70  Triglycerides goal less than 200     Followup in 3 months.    Orders:  -     rosuvastatin (CRESTOR) 5 MG tablet; Hold  Dispense: 90 tablet; Refill: 0    3. Palpitations  Assessment & Plan:  Infrequent. Lasting for hours, regular suggesting SVT.  Holter monitor 48 hours showing atrial tachycardia, not reported as symptomatic.  Transthoracic echocardiogram with normal ejection fraction mild MR and age-appropriate diastolic function after imaging review. Plan:  Verification of hypercalcemia by sending ionized calcium  Start low-dose beta-blockers for arrhythmia prevention  Lower amlodipine to allow for room blood pressure  Keep magnesium more than 2 and potassium more than 4      4. Chest pain, unspecified type  Assessment & Plan:  Past event of chest pain. Patient with risk factors: Age for gender, mixed hyperlipidemia, uncontrolled hypertension, past smoking.  Awaiting treadmill stress test, on hold for now in view of weakness/muscle pain/overall poor functional status after hospital admission.  Please schedule stress test after recovery.  If recurrence of chest pains in the meantime, consider Lexiscan nuclear stress test.      5. Hypercalcemia  -     Calcium, Ionized    6. Mixed hyperlipidemia  Comments:  Continue Crestor. We discussed diet and exercise. Labs drawn.  Assessment & Plan:  Lipid abnormalities are improving on  medical therapy.     Plan:  Hold rosuvastatin and continue fenofibrate 145 mg p.o. daily, in view of frailty, recent hospital admission, and diffuse myalgia.       Patient counseled in regards to heart healthy, low fat/ low cholesterol/low sat diet, daily exercise for 30 minutes, low to moderate intensity, and weight loss.     Patient Treatment Goals:   LDL goal is less than 70  Triglycerides goal less than 200     Followup in 3 months.    Orders:  -     rosuvastatin (CRESTOR) 5 MG tablet; Hold  Dispense: 90 tablet; Refill: 0    Other orders  -     amLODIPine (NORVASC) 5 MG tablet; Take 0.5 tablets by mouth Daily for 60 days.  Dispense: 30 tablet; Refill: 0  -     metoprolol succinate XL (TOPROL-XL) 25 MG 24 hr tablet; Take 1 tablet by mouth Daily.  Dispense: 90 tablet; Refill: 3         Return in about 3 months (around 9/5/2024) for Follow-up with Dr Alvares.    Patient Instructions   Hold rosuvastatin  Start Metoprolol Succinate ER 25 mg 1 tab daily (1/2 tab for 2 days to get used to it)  Decrease amlodipine 5 mg to 1/2 tab daily    Josef Alvares MD

## 2024-06-10 ENCOUNTER — TELEPHONE (OUTPATIENT)
Dept: CARDIOLOGY | Facility: CLINIC | Age: 69
End: 2024-06-10
Payer: MEDICARE

## 2024-06-10 NOTE — TELEPHONE ENCOUNTER
Patient called for results from the bloodwork ordered by Dr Alvares. All I see is the Calcium order, but no results. Patient PH# [phone].

## 2024-06-10 NOTE — TELEPHONE ENCOUNTER
Patient had labs drawn 06- with her PCP. Reports orders were needing added on to the labs? Unsure what is requesting other than calcium which has not resulted. Let patient know someone will reach out once resulted

## 2024-06-11 ENCOUNTER — LAB (OUTPATIENT)
Dept: FAMILY MEDICINE CLINIC | Facility: CLINIC | Age: 69
End: 2024-06-11
Payer: MEDICARE

## 2024-06-12 ENCOUNTER — TELEPHONE (OUTPATIENT)
Dept: FAMILY MEDICINE CLINIC | Facility: CLINIC | Age: 69
End: 2024-06-12
Payer: MEDICARE

## 2024-06-12 NOTE — TELEPHONE ENCOUNTER
Caller: Linda Good    Relationship: Self    Best call back number: [phone]     Caller requesting test results: THE PATIENT LINDA     What test was performed: LABS    When was the test performed: 0611/2024    Where was the test performed: AT THE PRACTICE     Additional notes: THE PATIENT REPORTED THAT CARRIE CORTEZ ORDERED LABS THAT SHE HAD DONE YESTERDAY, 06/11/2024, AND IS REQUESTING A CALL BACK WITH RESULTS.

## 2024-06-12 NOTE — TELEPHONE ENCOUNTER
Caller: Linda Good    Relationship: Self    Best call back number: [phone]     What is the medical concern/diagnosis: CHANGE IN BOWEL HABITS    What specialty or service is being requested: COLONOSCOPY    What is the provider, practice or medical service name: [facility]    What is the office phone number: [phone]    Any additional details: SHE IS NOT DUE FOR A ROUTINE ONE FOR A FEW YEARS AND IS WANTING TO GET THIS DUE TO CHANGE IN BOWEL HABITS THAT CONCERN HER. PLEASE LET HER KNOW.

## 2024-06-13 ENCOUNTER — TELEPHONE (OUTPATIENT)
Dept: FAMILY MEDICINE CLINIC | Facility: CLINIC | Age: 69
End: 2024-06-13
Payer: MEDICARE

## 2024-06-13 DIAGNOSIS — R19.4 CHANGE IN BOWEL HABITS: Primary | ICD-10-CM

## 2024-06-13 NOTE — TELEPHONE ENCOUNTER
Caller: Linda Good    Relationship: Self    Best call back number: [phone]  Caller requesting test results: YES    What test was performed: LAB    When was the test performed: 6/5    Where was the test performed: OFFICE    Additional notes: PT STATED THAT SHE HAS AN KASHIF COMING UP AND WILL LIKE TO HAVE TEST RESULTS TO BE POSTED ON [facility] ALSO

## 2024-06-14 ENCOUNTER — OFFICE VISIT (OUTPATIENT)
Dept: CARDIOLOGY | Facility: CLINIC | Age: 69
End: 2024-06-14
Payer: MEDICARE

## 2024-06-14 VITALS
SYSTOLIC BLOOD PRESSURE: 120 MMHG | HEIGHT: 62 IN | DIASTOLIC BLOOD PRESSURE: 66 MMHG | RESPIRATION RATE: 14 BRPM | OXYGEN SATURATION: 98 % | BODY MASS INDEX: 21.16 KG/M2 | HEART RATE: 72 BPM | WEIGHT: 115 LBS

## 2024-06-14 DIAGNOSIS — R07.9 CHEST PAIN, UNSPECIFIED TYPE: ICD-10-CM

## 2024-06-14 DIAGNOSIS — E83.52 HYPERCALCEMIA: ICD-10-CM

## 2024-06-14 DIAGNOSIS — E78.2 MIXED HYPERLIPIDEMIA: ICD-10-CM

## 2024-06-14 DIAGNOSIS — I10 PRIMARY HYPERTENSION: Primary | ICD-10-CM

## 2024-06-14 DIAGNOSIS — R00.2 PALPITATIONS: ICD-10-CM

## 2024-06-14 NOTE — ASSESSMENT & PLAN NOTE
Lipid abnormalities are improving on medical therapy.     Plan:  Hold rosuvastatin and continue fenofibrate 145 mg p.o. daily, in view of frailty, recent hospital admission, and diffuse myalgia.       Patient counseled in regards to heart healthy, low fat/ low cholesterol/low sat diet, daily exercise for 30 minutes, low to moderate intensity, and weight loss.     Patient Treatment Goals:   LDL goal is less than 70  Triglycerides goal less than 200     Followup in 3 months

## 2024-06-14 NOTE — PROGRESS NOTES
"[facility]  [facility] CARDIOLOGY  [address] [place] [address]  Dept: [phone]  Dept Fax: [phone]    Date: 06/14/2024  Patient: Linda Good  YOB: 1955    Follow Up Office Visit Note    Interval Follow-up  Ms. Linda Good is a 69 y.o. female who is here for follow-up on Hypertension.    Subjective   Patient overall feeling better.  Initially some side effects on metoprolol, improved on switching to half a pill daily then going back up to 1 pill daily, taken in the morning.  Still achy in the upper and lower extremities, started to exercise and walk around, have not fully recovered yet.  Patient denies angina, orthopnea, PND, palpitations, lightheadedness, syncope or medications side-effects.    The following portions of the patient's history were reviewed and updated as appropriate: allergies, current medications, past family history, past medical history, past social history, past surgical history, and problem list.    Medications: No Known Allergies   Current Outpatient Medications   Medication Instructions    amLODIPine (NORVASC) 2.5 mg, Oral, Every 24 Hours Scheduled    fenofibrate (TRICOR) 145 mg, Oral, Daily    fluconazole (DIFLUCAN) 150 mg, Oral, Every 3 Days    metoprolol succinate XL (TOPROL-XL) 25 mg, Oral, Daily    rosuvastatin (CRESTOR) 5 MG tablet Hold    vitamin B-1 50 mg, Oral, Daily       Tobacco Use: Medium Risk (6/14/2024)    Patient History     Smoking Tobacco Use: Former     Smokeless Tobacco Use: Never     Passive Exposure: Not on file        Objective  Vitals:    06/14/24 1352   BP: 120/66   Pulse: 72   Resp: 14   SpO2: 98%   Weight: 52.2 kg (115 lb)   Height: 157.5 cm (62\")      Vitals:    06/14/24 1352   BP: 120/66   Pulse: 72   Resp: 14   SpO2: 98%   Weight: 52.2 kg (115 lb)   Height: 157.5 cm (62\")          Physical Exam  Constitutional:       Appearance: Not in distress.   Neck:      Vascular: JVD normal.   Pulmonary:    " "  Breath sounds: Normal breath sounds.   Cardiovascular:      Normal rate. Regular rhythm.      Murmurs: There is no murmur.   Pulses:     Intact distal pulses.   Edema:     Peripheral edema absent.   Neurological:      Mental Status: Alert.              Diagnostic Data  Lab Results   Component Value Date     06/03/2024    K 5.1 06/03/2024     06/03/2024    CO2 22 06/03/2024    MG 2.0 05/25/2024    BUN 25 06/03/2024    CREATININE 0.85 06/03/2024    CALCIUM 11.0 (H) 06/03/2024    BILITOT 0.3 06/03/2024    ALKPHOS 123 (H) 06/03/2024    ALT 39 (H) 06/03/2024    AST 42 (H) 06/03/2024    GLUCOSE 104 (H) 06/03/2024    ALBUMIN 4.7 06/03/2024     Lab Results   Component Value Date    WBC 7.2 06/03/2024    HGB 12.8 06/03/2024    HCT 39.1 06/03/2024     (H) 06/03/2024     No results found for: \"APTT\", \"INR\", \"PTT\"  Lab Results   Component Value Date    CKTOTAL 120 05/21/2024     No results found for: \"BNP\", \"PROBNP\"    Lab Results   Component Value Date    CHLPL 174 04/11/2024    TRIG 519 (H) 04/11/2024    HDL 64 04/11/2024    LDL 38 04/11/2024     Lab Results   Component Value Date    TSH 2.020 05/20/2024    FREET4 1.25 05/20/2024       CV Diagnostics:  Procedures    CXR: Results for orders placed in visit on 06/03/24    XR Chest PA & Lateral    Narrative  XR CHEST PA AND LATERAL    Date of Exam: 6/3/2024 11:30 AM EDT    Indication: F/U on pneumonia from 5/22, leukocytosis    Comparison: 5/24/2024.    Findings:  Aeration is significantly improved from comparison without persistent focal opacity. There is no effusion or pneumothorax. Normal heart and mediastinal contours.    Impression  Impression:  Improved aeration and resolution of prior opacities, without current evidence of acute disease.      Electronically Signed: Ruperto Chavez MD  6/3/2024 4:08 PM EDT  Workstation ID: [id number]     ECHO/MUGA: Results for orders placed during the hospital encounter of 05/20/24    Adult Transthoracic Echo Complete W/ " Cont if Necessary Per Protocol    Interpretation Summary    Left ventricular systolic function is normal. Left ventricular ejection fraction appears to be 61 - 65%.    Mild mitral valve regurgitation is present.    Mild tricuspid valve regurgitation is present.    Estimated right ventricular systolic pressure from tricuspid regurgitation is normal (<35 mmHg).     STRESS TESTS: No results found for this or any previous visit.     CARDIAC CATH: No results found for this or any previous visit.     DEVICES: No valid procedures specified.   HOLTER: Results for orders placed in visit on 04/22/24    Holter Monitor - 48 Hour    Interpretation Summary    An abnormal monitor study.    No events reported by patient button press/no diary provided.    There were 2 episodes of atrial tachycardia recorded, longest/fastest 8 beats [average heart rate 136 bpm, max heart rate 171 bpm], likely asymptomatic since not recorded by button press/on event log.    There was no atrial fibrillation, VT, cardiac pauses or heart blocks detected.     CT/MRI:  Results for orders placed during the hospital encounter of 05/20/24    CT Chest Without Contrast Diagnostic    Narrative  CT CHEST WO CONTRAST DIAGNOSTIC, CT ABDOMEN PELVIS W CONTRAST    Date of Exam: 5/22/2024 12:11 PM EDT    Indication: cough. Abdominal pain    Comparison: None available.    Technique: Axial CT images were obtained of the chest without contrast administration. Axial CT images were obtained of the abdomen and pelvis after intravenous administration of 75 cc Isovue-300. Reconstructed coronal and sagittal images were also  obtained. Automated exposure control and iterative construction methods were used.      Findings:    CT CHEST:  MEDIASTINUM: Unremarkable. Aortic and heart size are normal. No mass nor pericardial effusion.  CORONARY ARTERIES: No calcified atherosclerotic disease.  LUNGS: There is nodular and more confluent airspace disease within the lower lungs  bilaterally, left more so than right. There is a degree of interlobular septal thickening/interstitial edema. Imaging features are most suggestive of multifocal pneumonia.  No single suspicious nodule is identified.  PLEURAL SPACE: There are small bilateral pleural effusions.        CT ABDOMEN AND PELVIS:  LIVER:  Unremarkable parenchyma without focal lesion.  BILIARY/GALLBLADDER:  Unremarkable  SPLEEN:  Unremarkable  PANCREAS:  Unremarkable  ADRENAL:  Unremarkable  KIDNEYS:  Unremarkable parenchyma with no solid mass identified. No obstruction.  No calculus identified.  GASTROINTESTINAL/MESENTERY:  No evidence of obstruction nor inflammation. The appendix is normal.  AORTA/IVC:  Normal caliber.    RETROPERITONEUM/LYMPH NODES:  Unremarkable    REPRODUCTIVE: There is a right-sided uterine fibroid. There is minimal free fluid in the pelvis, a frequent normal finding in females. No definitive etiology identified.  BLADDER:  Unremarkable    OSSEUS STRUCTURES:  Typical for age with no acute process identified.    Impression  Impression:  1.Multifocal pneumonia with small bilateral parapneumonic effusions.  2.Minimal nonspecific free fluid in the pelvis, a frequent normal finding in females as no etiology identified.  3.Other incidental nonemergent findings as detailed above.        Electronically Signed: Reid Banerjee MD  5/22/2024 12:48 PM EDT  Workstation ID: [id number]    VASCULAR: No valid procedures specified.     Assessment and Plan  Diagnoses and all orders for this visit:    1. Primary hypertension (Primary)  Assessment & Plan:  Hypertension is stable.  Dietary sodium restriction.  Regular aerobic exercise.  Ambulatory blood pressure monitoring.  Continue amlodipine to 2.5 mg p.o. daily; and change metoprolol succinate ER 25 mg p.o. daily to p.m. in view of mild side effects.  Blood pressure will be reassessed in 3 months.      2. Mixed hyperlipidemia  Assessment & Plan:  Lipid abnormalities are improving on  medical therapy.     Plan:  Hold rosuvastatin and continue fenofibrate 145 mg p.o. daily, in view of frailty, recent hospital admission, and diffuse myalgia.       Patient counseled in regards to heart healthy, low fat/ low cholesterol/low sat diet, daily exercise for 30 minutes, low to moderate intensity, and weight loss.     Patient Treatment Goals:   LDL goal is less than 70  Triglycerides goal less than 200     Followup in 3 months        3. Palpitations  Assessment & Plan:  Infrequent. Lasting for hours, regular rhythm.  Holter monitor 48 hours showing atrial tachycardia, not reported as symptomatic.  Transthoracic echocardiogram with normal ejection fraction mild MR and age-appropriate diastolic function after imaging review.  Ionized calcium normal.  Plan:  Continue metoprolol succinate ER 25 mg p.o. daily in p.m. for arrhythmia prevention  Continue amlodipine 2.5 mg for room blood pressure  Keep magnesium more than 2 and potassium more than 4      4. Chest pain, unspecified type  Assessment & Plan:  Doing well with no recurrent. Patient with risk factors: Age for gender, mixed hyperlipidemia, uncontrolled hypertension, past smoking.  Awaiting treadmill stress test, on hold for now in view of weakness/muscle pain/overall poor functional status after hospital admission.  Schedule stress test in 1 month to allow for recovery.  If recurrence of chest pains in the meantime, consider Lexiscan nuclear stress test.     Orders:  -     Treadmill Stress Test; Future    5. Hypercalcemia  Assessment & Plan:  Ionized calcium normal.  Reassurance provided.           Return for Follow-up with Dr Alvares.    There are no Patient Instructions on file for this visit.    Josef Alvares MD

## 2024-06-14 NOTE — ASSESSMENT & PLAN NOTE
Infrequent. Lasting for hours, regular rhythm.  Holter monitor 48 hours showing atrial tachycardia, not reported as symptomatic.  Transthoracic echocardiogram with normal ejection fraction mild MR and age-appropriate diastolic function after imaging review.  Ionized calcium normal.  Plan:  Continue metoprolol succinate ER 25 mg p.o. daily in p.m. for arrhythmia prevention  Continue amlodipine 2.5 mg for room blood pressure  Keep magnesium more than 2 and potassium more than 4

## 2024-06-14 NOTE — ASSESSMENT & PLAN NOTE
Doing well with no recurrent. Patient with risk factors: Age for gender, mixed hyperlipidemia, uncontrolled hypertension, past smoking.  Awaiting treadmill stress test, on hold for now in view of weakness/muscle pain/overall poor functional status after hospital admission.  Schedule stress test in 1 month to allow for recovery.  If recurrence of chest pains in the meantime, consider Lexiscan nuclear stress test.

## 2024-06-14 NOTE — ASSESSMENT & PLAN NOTE
Hypertension is stable.  Dietary sodium restriction.  Regular aerobic exercise.  Ambulatory blood pressure monitoring.  Continue amlodipine to 2.5 mg p.o. daily; and change metoprolol succinate ER 25 mg p.o. daily to p.m. in view of mild side effects.  Blood pressure will be reassessed in 3 months.

## 2024-06-17 ENCOUNTER — TELEPHONE (OUTPATIENT)
Dept: CARDIOLOGY | Facility: CLINIC | Age: 69
End: 2024-06-17
Payer: MEDICARE

## 2024-06-17 DIAGNOSIS — R00.2 PALPITATIONS: ICD-10-CM

## 2024-06-17 DIAGNOSIS — R53.83 OTHER FATIGUE: ICD-10-CM

## 2024-06-17 DIAGNOSIS — E83.52 HYPERCALCEMIA: Primary | ICD-10-CM

## 2024-06-17 DIAGNOSIS — R07.9 CHEST PAIN, UNSPECIFIED TYPE: ICD-10-CM

## 2024-06-17 NOTE — TELEPHONE ENCOUNTER
I spoke with her about the results from when I saw her on 6/3. Did she have labs drawn since then? They should have come through on her MyChart as well??

## 2024-06-17 NOTE — TELEPHONE ENCOUNTER
Spoke with Ms. Good and advised that  would like some additional labs drawn that could not be added to the last set she had. She states she is going to the [place] office tomorrow for labs and I advised I would send in what we need and she needs to make sure to tell them to look for these.  She verbalized understanding.

## 2024-06-17 NOTE — TELEPHONE ENCOUNTER
----- Message from Anne Marie WARE sent at 6/14/2024  5:15 PM EDT -----  When she comes for a stress test draw these labs:  ionized calcium, calcium, albumin, vitamin D, PTH, PTH RP, phosphorus.  Thank you!

## 2024-06-18 ENCOUNTER — TELEPHONE (OUTPATIENT)
Dept: FAMILY MEDICINE CLINIC | Facility: CLINIC | Age: 69
End: 2024-06-18

## 2024-06-18 ENCOUNTER — LAB (OUTPATIENT)
Dept: FAMILY MEDICINE CLINIC | Facility: CLINIC | Age: 69
End: 2024-06-18
Payer: MEDICARE

## 2024-06-18 NOTE — TELEPHONE ENCOUNTER
Caller: Linda Good    Relationship: Self    Best call back number     What is the best time to reach you: ANYTIME    Who are you requesting to speak with (clinical staff, provider,  specific staff member): CLINICAL STAFF    Do you know the name of the person who called: LINDA    What was the call regarding: PATIENT WAS ASKING IF SHE NEEDED TO FAST PRIOR TO APPT, SHE HAS LABS TODAY    Is it okay if the provider responds through MyChart: YES OR CALL

## 2024-06-19 DIAGNOSIS — R65.10 SIRS (SYSTEMIC INFLAMMATORY RESPONSE SYNDROME): Primary | ICD-10-CM

## 2024-06-19 LAB
ALBUMIN SERPL-MCNC: 4.6 G/DL (ref 3.9–4.9)
ALP SERPL-CCNC: 50 IU/L (ref 44–121)
ALT SERPL-CCNC: 64 IU/L (ref 0–32)
AST SERPL-CCNC: 49 IU/L (ref 0–40)
BASOPHILS # BLD AUTO: 0.1 X10E3/UL (ref 0–0.2)
BASOPHILS NFR BLD AUTO: 1 %
BILIRUB SERPL-MCNC: 0.2 MG/DL (ref 0–1.2)
BUN SERPL-MCNC: 15 MG/DL (ref 8–27)
BUN/CREAT SERPL: 19 (ref 12–28)
CALCIUM SERPL-MCNC: 9.9 MG/DL (ref 8.7–10.3)
CHLORIDE SERPL-SCNC: 105 MMOL/L (ref 96–106)
CO2 SERPL-SCNC: 24 MMOL/L (ref 20–29)
CREAT SERPL-MCNC: 0.81 MG/DL (ref 0.57–1)
EGFRCR SERPLBLD CKD-EPI 2021: 80 ML/MIN/1.73
EOSINOPHIL # BLD AUTO: 0.3 X10E3/UL (ref 0–0.4)
EOSINOPHIL NFR BLD AUTO: 5 %
ERYTHROCYTE [DISTWIDTH] IN BLOOD BY AUTOMATED COUNT: 12.3 % (ref 11.7–15.4)
GLOBULIN SER CALC-MCNC: 2.3 G/DL (ref 1.5–4.5)
GLUCOSE SERPL-MCNC: 83 MG/DL (ref 70–99)
HCT VFR BLD AUTO: 35.8 % (ref 34–46.6)
HGB BLD-MCNC: 11.6 G/DL (ref 11.1–15.9)
IMM GRANULOCYTES # BLD AUTO: 0 X10E3/UL (ref 0–0.1)
IMM GRANULOCYTES NFR BLD AUTO: 0 %
LYMPHOCYTES # BLD AUTO: 2.2 X10E3/UL (ref 0.7–3.1)
LYMPHOCYTES NFR BLD AUTO: 35 %
MCH RBC QN AUTO: 31.6 PG (ref 26.6–33)
MCHC RBC AUTO-ENTMCNC: 32.4 G/DL (ref 31.5–35.7)
MCV RBC AUTO: 98 FL (ref 79–97)
MONOCYTES # BLD AUTO: 0.6 X10E3/UL (ref 0.1–0.9)
MONOCYTES NFR BLD AUTO: 9 %
NEUTROPHILS # BLD AUTO: 3.1 X10E3/UL (ref 1.4–7)
NEUTROPHILS NFR BLD AUTO: 50 %
PLATELET # BLD AUTO: 480 X10E3/UL (ref 150–450)
POTASSIUM SERPL-SCNC: 5 MMOL/L (ref 3.5–5.2)
PROT SERPL-MCNC: 6.9 G/DL (ref 6–8.5)
RBC # BLD AUTO: 3.67 X10E6/UL (ref 3.77–5.28)
SODIUM SERPL-SCNC: 143 MMOL/L (ref 134–144)
WBC # BLD AUTO: 6.2 X10E3/UL (ref 3.4–10.8)

## 2024-06-26 LAB
25(OH)D3+25(OH)D2 SERPL-MCNC: 36.1 NG/ML (ref 30–100)
ALBUMIN SERPL-MCNC: 4.7 G/DL (ref 3.9–4.9)
CA-I SERPL ISE-MCNC: 5.3 MG/DL (ref 4.5–5.6)
CALCIUM SERPL-MCNC: 9.9 MG/DL (ref 8.7–10.3)
PHOSPHATE SERPL-MCNC: 4.1 MG/DL (ref 3–4.3)
PTH RELATED PROT SERPL-SCNC: <2 PMOL/L
PTH-INTACT SERPL-MCNC: 18 PG/ML (ref 15–65)

## 2024-07-05 ENCOUNTER — OFFICE VISIT (OUTPATIENT)
Dept: FAMILY MEDICINE CLINIC | Facility: CLINIC | Age: 69
End: 2024-07-05
Payer: MEDICARE

## 2024-07-05 ENCOUNTER — TELEPHONE (OUTPATIENT)
Dept: CARDIOLOGY | Facility: CLINIC | Age: 69
End: 2024-07-05
Payer: MEDICARE

## 2024-07-05 VITALS
WEIGHT: 122 LBS | HEIGHT: 62 IN | DIASTOLIC BLOOD PRESSURE: 70 MMHG | SYSTOLIC BLOOD PRESSURE: 108 MMHG | OXYGEN SATURATION: 99 % | BODY MASS INDEX: 22.45 KG/M2 | HEART RATE: 72 BPM

## 2024-07-05 DIAGNOSIS — D75.839 THROMBOCYTOSIS: ICD-10-CM

## 2024-07-05 DIAGNOSIS — R74.8 ELEVATED LIVER ENZYMES: ICD-10-CM

## 2024-07-05 DIAGNOSIS — M25.50 MULTIPLE JOINT PAIN: Primary | ICD-10-CM

## 2024-07-05 PROCEDURE — 99214 OFFICE O/P EST MOD 30 MIN: CPT | Performed by: PHYSICIAN ASSISTANT

## 2024-07-05 PROCEDURE — 3044F HG A1C LEVEL LT 7.0%: CPT | Performed by: PHYSICIAN ASSISTANT

## 2024-07-05 PROCEDURE — 3074F SYST BP LT 130 MM HG: CPT | Performed by: PHYSICIAN ASSISTANT

## 2024-07-05 PROCEDURE — 3078F DIAST BP <80 MM HG: CPT | Performed by: PHYSICIAN ASSISTANT

## 2024-07-05 PROCEDURE — 1126F AMNT PAIN NOTED NONE PRSNT: CPT | Performed by: PHYSICIAN ASSISTANT

## 2024-07-05 NOTE — TELEPHONE ENCOUNTER
----- Message from Josef Alvares sent at 7/4/2024  1:50 PM EDT -----  Please inform patient that her Blood work was normal. Thank you!

## 2024-07-06 NOTE — PROGRESS NOTES
"Chief Complaint  referral (Pt wants referral to rheumatology for joint pain. )    Subjective          Linda Good presents to [facility]  History of Present Illness  Patient in today to discuss getting a referral to see rheumatology. She states has continued to have pain to bilateral upper extremity joints including hands, wrists, elbows and shoulder  since having systemic inflammatory response syndrome that was felt secondary to infection -  pneumonia.Initially had swelling to hands when in hospital but has not returned.  Denies any pain to lower extremities. She has seen infectious disease  and states does not have any further f/up-- was expected her joint pains would go away with time but since have persisted, she would like to see rheumatology. Had negative KAYLA and rheumatoid factor tests . She will be back in a few weeks to also recheck on elevated platelets and liver enzymes that were showing downward trend with last labs. Denies fever.   Extremity Pain   The pain is present in the left shoulder, left wrist, left hand, left fingers, left arm, left elbow, right shoulder, right arm, right elbow, right wrist, right hand and right fingers. The problem has been worse. The quality of the pain is described as aching. The pain is at a severity of 8/10. Associated symptoms include numbness, tingling and difficulty holding things. Pertinent negatives include no fever.       Objective   Vital Signs:   /70   Pulse 72   Ht 157.5 cm (62\")   Wt 55.3 kg (122 lb)   SpO2 99%   BMI 22.31 kg/m²     Body mass index is 22.31 kg/m².    Review of Systems   Constitutional:  Negative for fever.   HENT:  Negative for congestion and sore throat.    Respiratory:  Negative for cough and shortness of breath.    Cardiovascular:  Negative for chest pain and palpitations.   Gastrointestinal:  Negative for diarrhea, nausea and vomiting.   Musculoskeletal:  Positive for arthralgias.   Neurological:  " Positive for tingling and numbness. Negative for headache.       Past History:  Medical History: has a past medical history of Diabetes mellitus (3/1/1993), Hyperlipidemia, Hypertension, Pneumonia (5/23/24), and Pregnancy.   Surgical History: has a past surgical history that includes Breast surgery (1997); Tubal ligation (1993); Colonoscopy; and Interventional radiology procedure (N/A, 05/20/2024).   Family History: family history includes Cancer in her father; Coronary artery disease in her sister; Diabetes in her mother; Heart disease in her mother; Lung cancer in her father; Other in her mother.   Social History: reports that she quit smoking about 22 years ago. Her smoking use included cigarettes. She started smoking about 49 years ago. She has a 13.5 pack-year smoking history. She has never used smokeless tobacco. She reports that she does not currently use alcohol after a past usage of about 10.0 standard drinks of alcohol per week. She reports that she does not use drugs.      Current Outpatient Medications:     amLODIPine (NORVASC) 5 MG tablet, Take 0.5 tablets by mouth Daily for 60 days., Disp: 30 tablet, Rfl: 0    fenofibrate (TRICOR) 145 MG tablet, Take 1 tablet by mouth Daily., Disp: 90 tablet, Rfl: 3    metoprolol succinate XL (TOPROL-XL) 25 MG 24 hr tablet, Take 1 tablet by mouth Daily., Disp: 90 tablet, Rfl: 3    rosuvastatin (CRESTOR) 5 MG tablet, Hold, Disp: 90 tablet, Rfl: 0    Thiamine HCl (vitamin B-1) 50 MG tablet, Take 1 tablet by mouth Daily., Disp: , Rfl:   Allergies: Patient has no known allergies.    Physical Exam  Constitutional:       Appearance: Normal appearance.   HENT:      Right Ear: Tympanic membrane normal.      Left Ear: Tympanic membrane normal.      Mouth/Throat:      Mouth: Mucous membranes are moist.   Eyes:      Pupils: Pupils are equal, round, and reactive to light.   Cardiovascular:      Heart sounds: Normal heart sounds.   Pulmonary:      Effort: Pulmonary effort is  normal.      Breath sounds: Normal breath sounds.   Musculoskeletal:      Comments: FROM of hands, wrists, elbows and shoulders bilaterally; no swelling or redness noted to joints;    Neurological:      Mental Status: She is alert and oriented to person, place, and time.   Psychiatric:         Mood and Affect: Mood normal.         Behavior: Behavior normal.             Assessment and Plan   Diagnoses and all orders for this visit:    1. Multiple joint pain (Primary)  -     Ambulatory Referral to Rheumatology  With persistent pain, will put in referral for rheumatology- if symptoms progress prior to ER if needed.   2. Thrombocytosis  Will be back in a few weeks to repeat CBC.   3. Elevated liver enzymes  Will repeat CMP with next labs.         Follow Up   No follow-ups on file.  Patient was given instructions and counseling regarding her condition or for health maintenance advice. Please see specific information pulled into the AVS if appropriate.     Julissa Vernon PA-C

## 2024-07-16 ENCOUNTER — TELEPHONE (OUTPATIENT)
Dept: FAMILY MEDICINE CLINIC | Facility: CLINIC | Age: 69
End: 2024-07-16
Payer: MEDICARE

## 2024-07-16 DIAGNOSIS — R65.10 SIRS (SYSTEMIC INFLAMMATORY RESPONSE SYNDROME): Primary | ICD-10-CM

## 2024-07-16 DIAGNOSIS — M25.50 MULTIPLE JOINT PAIN: ICD-10-CM

## 2024-07-16 NOTE — TELEPHONE ENCOUNTER
Pt called. She can not be seen by [facility] until September. Due to the amount of pain she is having She would like to go to [facility] but needs a diagnosis. She is scheduled for labs on 07/18. Not sure if she has actually been diagnosed with Rheumatoid Arthritis? Please call patient to advise [phone]

## 2024-07-18 ENCOUNTER — LAB (OUTPATIENT)
Dept: FAMILY MEDICINE CLINIC | Facility: CLINIC | Age: 69
End: 2024-07-18
Payer: MEDICARE

## 2024-07-19 LAB
ALBUMIN SERPL-MCNC: 4.9 G/DL (ref 3.9–4.9)
ALP SERPL-CCNC: 41 IU/L (ref 44–121)
ALT SERPL-CCNC: 64 IU/L (ref 0–32)
AST SERPL-CCNC: 66 IU/L (ref 0–40)
BASOPHILS # BLD AUTO: 0.1 X10E3/UL (ref 0–0.2)
BASOPHILS NFR BLD AUTO: 1 %
BILIRUB SERPL-MCNC: <0.2 MG/DL (ref 0–1.2)
BUN SERPL-MCNC: 15 MG/DL (ref 8–27)
BUN/CREAT SERPL: 15 (ref 12–28)
CALCIUM SERPL-MCNC: 10.5 MG/DL (ref 8.7–10.3)
CHLORIDE SERPL-SCNC: 102 MMOL/L (ref 96–106)
CO2 SERPL-SCNC: 23 MMOL/L (ref 20–29)
CREAT SERPL-MCNC: 0.97 MG/DL (ref 0.57–1)
EGFRCR SERPLBLD CKD-EPI 2021: 64 ML/MIN/1.73
EOSINOPHIL # BLD AUTO: 0.1 X10E3/UL (ref 0–0.4)
EOSINOPHIL NFR BLD AUTO: 3 %
ERYTHROCYTE [DISTWIDTH] IN BLOOD BY AUTOMATED COUNT: 12.8 % (ref 11.7–15.4)
GLOBULIN SER CALC-MCNC: 2.5 G/DL (ref 1.5–4.5)
GLUCOSE SERPL-MCNC: 96 MG/DL (ref 70–99)
HCT VFR BLD AUTO: 39.7 % (ref 34–46.6)
HGB BLD-MCNC: 13 G/DL (ref 11.1–15.9)
IMM GRANULOCYTES # BLD AUTO: 0 X10E3/UL (ref 0–0.1)
IMM GRANULOCYTES NFR BLD AUTO: 0 %
LYMPHOCYTES # BLD AUTO: 1.7 X10E3/UL (ref 0.7–3.1)
LYMPHOCYTES NFR BLD AUTO: 31 %
MCH RBC QN AUTO: 31 PG (ref 26.6–33)
MCHC RBC AUTO-ENTMCNC: 32.7 G/DL (ref 31.5–35.7)
MCV RBC AUTO: 95 FL (ref 79–97)
MONOCYTES # BLD AUTO: 0.6 X10E3/UL (ref 0.1–0.9)
MONOCYTES NFR BLD AUTO: 11 %
NEUTROPHILS # BLD AUTO: 2.9 X10E3/UL (ref 1.4–7)
NEUTROPHILS NFR BLD AUTO: 54 %
PLATELET # BLD AUTO: 511 X10E3/UL (ref 150–450)
POTASSIUM SERPL-SCNC: 5 MMOL/L (ref 3.5–5.2)
PROT SERPL-MCNC: 7.4 G/DL (ref 6–8.5)
RBC # BLD AUTO: 4.19 X10E6/UL (ref 3.77–5.28)
SODIUM SERPL-SCNC: 142 MMOL/L (ref 134–144)
WBC # BLD AUTO: 5.5 X10E3/UL (ref 3.4–10.8)

## 2024-07-23 ENCOUNTER — TELEPHONE (OUTPATIENT)
Dept: FAMILY MEDICINE CLINIC | Facility: CLINIC | Age: 69
End: 2024-07-23

## 2024-07-23 NOTE — TELEPHONE ENCOUNTER
Caller: Linda Good    Relationship: Self    Best call back number: [phone]     Caller requesting test results: LABS     What test was performed: LABS     When was the test performed: 7.18    Where was the test performed: OFFICE     Additional notes: CONCERNS ABOUT LIVER RESULTS AND ALSO OTHER LABS MISSING LIKE FOR CHOLESTEROL. WANTS TO SPEAK TO SOMEONE ASAP ABOUT IT

## 2024-07-29 ENCOUNTER — TELEPHONE (OUTPATIENT)
Dept: CARDIOLOGY | Facility: CLINIC | Age: 69
End: 2024-07-29
Payer: MEDICARE

## 2024-07-29 ENCOUNTER — OFFICE VISIT (OUTPATIENT)
Dept: FAMILY MEDICINE CLINIC | Facility: CLINIC | Age: 69
End: 2024-07-29
Payer: MEDICARE

## 2024-07-29 VITALS
HEART RATE: 76 BPM | WEIGHT: 126 LBS | BODY MASS INDEX: 23.19 KG/M2 | HEIGHT: 62 IN | DIASTOLIC BLOOD PRESSURE: 70 MMHG | OXYGEN SATURATION: 98 % | SYSTOLIC BLOOD PRESSURE: 134 MMHG

## 2024-07-29 DIAGNOSIS — I10 PRIMARY HYPERTENSION: ICD-10-CM

## 2024-07-29 DIAGNOSIS — R74.8 ELEVATED LIVER ENZYMES: ICD-10-CM

## 2024-07-29 DIAGNOSIS — Z00.00 MEDICARE ANNUAL WELLNESS VISIT, SUBSEQUENT: Primary | ICD-10-CM

## 2024-07-29 DIAGNOSIS — E78.2 HYPERLIPIDEMIA, MIXED: ICD-10-CM

## 2024-07-29 PROCEDURE — 1126F AMNT PAIN NOTED NONE PRSNT: CPT | Performed by: PHYSICIAN ASSISTANT

## 2024-07-29 PROCEDURE — 3075F SYST BP GE 130 - 139MM HG: CPT | Performed by: PHYSICIAN ASSISTANT

## 2024-07-29 PROCEDURE — 3078F DIAST BP <80 MM HG: CPT | Performed by: PHYSICIAN ASSISTANT

## 2024-07-29 PROCEDURE — G0439 PPPS, SUBSEQ VISIT: HCPCS | Performed by: PHYSICIAN ASSISTANT

## 2024-07-29 PROCEDURE — 96160 PT-FOCUSED HLTH RISK ASSMT: CPT | Performed by: PHYSICIAN ASSISTANT

## 2024-07-29 PROCEDURE — 3044F HG A1C LEVEL LT 7.0%: CPT | Performed by: PHYSICIAN ASSISTANT

## 2024-07-29 RX ORDER — CHOLECALCIFEROL (VITAMIN D3) 25 MCG
1000 TABLET ORAL DAILY
COMMUNITY

## 2024-07-29 RX ORDER — PREDNISONE 10 MG/1
20 TABLET ORAL DAILY
COMMUNITY
Start: 2024-07-19

## 2024-07-29 RX ORDER — PREDNISONE 5 MG/1
5 TABLET ORAL DAILY
COMMUNITY
Start: 2024-07-23

## 2024-07-29 RX ORDER — VITAMIN E (DL,TOCOPHERYL ACET) 90 MG
200 CAPSULE ORAL DAILY
COMMUNITY

## 2024-07-29 NOTE — TELEPHONE ENCOUNTER
----- Message from Josef Alvares sent at 7/28/2024 10:10 PM EDT -----  Please inform patient her labs are unchanged. Thank you!

## 2024-07-29 NOTE — PROGRESS NOTES
Subjective   The ABCs of the Annual Wellness Visit  Medicare Wellness Visit      Linda Good is a 69 y.o. patient who presents for a Medicare Wellness Visit.    Patient in today for medicare wellness. She states has been feeling some better with joint pains  following SIRS diagnosis from end of May- has been seeing rheumatologist- was started on prednisone and being tapered down. Her liver enzymes have continued to show mild elevation and she would like to get in with GI for consult. She is currently utd on gyn exam, mammogram and states colon screening utd. She states may consider zoster vaccine but will discuss with rheumatology.     The following portions of the patient's history were reviewed and   updated as appropriate: allergies, current medications, past family history, past medical history, past social history, past surgical history, and problem list.    Compared to one year ago, the patient's physical   health is worse.  Compared to one year ago, the patient's mental   health is the same.    Recent Hospitalizations:  This patient has had a [facility] admission record on file within the last 365 days.  Current Medical Providers:  Patient Care Team:  Julissa Vernon PA-C as PCP - General (Physician Assistant)  Jass Greenberg MD as Consulting Physician (Hospitalist)  Mirza Guerra APRN (Obstetrics and Gynecology)  Nicolasa Gracia APRN (Nurse Practitioner)  Nata Barbosa OD (Optometry)  Josef Alvares MD as Cardiologist (Cardiology)    Outpatient Medications Prior to Visit   Medication Sig Dispense Refill    amLODIPine (NORVASC) 5 MG tablet Take 0.5 tablets by mouth Daily for 60 days. 30 tablet 0    ascorbic acid (VITAMIN C) 1000 MG tablet Take 1 tablet by mouth Daily.      Cholecalciferol 25 MCG (1000 UT) tablet Take 1 tablet by mouth Daily.      cyanocobalamin (VITAMIN B-12) 500 MCG tablet Take 1 tablet by mouth Daily.      fenofibrate (TRICOR) 145 MG tablet Take 1 tablet by  "mouth Daily. 90 tablet 3    metoprolol succinate XL (TOPROL-XL) 25 MG 24 hr tablet Take 1 tablet by mouth Daily. 90 tablet 3    predniSONE (DELTASONE) 10 MG tablet Take 2 tablets by mouth Daily.      predniSONE (DELTASONE) 5 MG tablet Take 1 tablet by mouth Daily.      rosuvastatin (CRESTOR) 5 MG tablet Hold 90 tablet 0    Thiamine HCl (vitamin B-1) 50 MG tablet Take 1 tablet by mouth Daily.      Vitamin E 90 MG (200 UNIT) capsule Take 200 Units by mouth Daily.       No facility-administered medications prior to visit.     No opioid medication identified on active medication list. I have reviewed chart for other potential  high risk medication/s and harmful drug interactions in the elderly.      Aspirin is not on active medication list.  Aspirin use is not indicated based on review of current medical condition/s. Risk of harm outweighs potential benefits.  .    Patient Active Problem List   Diagnosis    Mixed hyperlipidemia    Primary hypertension    Combined forms of age-related cataract of both eyes    History of histoplasmosis    Nuclear senile cataract    Palpitations    Chest pain    Hypercalcemia    Medicare annual wellness visit, subsequent     Advance Care Planning (Click this link to access ACP Navigator)  Advance Directive is not on file.  ACP discussion was held with the patient during this visit. Patient has an advance directive (not in EMR), copy requested.        Objective   Vitals:    07/29/24 1405   BP: 134/70   Pulse: 76   SpO2: 98%   Weight: 57.2 kg (126 lb)   Height: 157.5 cm (62\")       Physical Exam  Constitutional:       Appearance: Normal appearance.   HENT:      Right Ear: Tympanic membrane normal.      Left Ear: Tympanic membrane normal.      Mouth/Throat:      Mouth: Mucous membranes are moist.   Eyes:      Pupils: Pupils are equal, round, and reactive to light.   Cardiovascular:      Rate and Rhythm: Normal rate and regular rhythm.      Heart sounds: Normal heart sounds.   Pulmonary:      " "Effort: Pulmonary effort is normal.      Breath sounds: Normal breath sounds.   Abdominal:      Palpations: Abdomen is soft.   Neurological:      Mental Status: She is alert and oriented to person, place, and time.   Psychiatric:         Mood and Affect: Mood normal.         Behavior: Behavior normal.          Estimated body mass index is 23.05 kg/m² as calculated from the following:    Height as of this encounter: 157.5 cm (62\").    Weight as of this encounter: 57.2 kg (126 lb).    BMI is within normal parameters. No other follow-up for BMI required.        Does the patient have evidence of cognitive impairment? No                                                                                               Health  Risk Assessment    Smoking Status:  Social History     Tobacco Use   Smoking Status Former    Current packs/day: 0.00    Average packs/day: 0.5 packs/day for 27.0 years (13.5 ttl pk-yrs)    Types: Cigarettes    Start date: 1975    Quit date:     Years since quittin.5   Smokeless Tobacco Never     Alcohol Consumption:  Social History     Substance and Sexual Activity   Alcohol Use Not Currently    Alcohol/week: 10.0 standard drinks of alcohol     Fall Risk Screen:  STEADI Fall Risk Assessment was completed, and patient is at LOW risk for falls.Assessment completed on:2024    Depression Screenin/29/2024     2:06 PM   PHQ-2/PHQ-9 Depression Screening   Little Interest or Pleasure in Doing Things 0-->not at all   Feeling Down, Depressed or Hopeless 0-->not at all   PHQ-9: Brief Depression Severity Measure Score 0     Health Habits and Functional and Cognitive Screenin/22/2024    10:02 AM   Functional & Cognitive Status   Do you have difficulty preparing food and eating? Yes   Do you have difficulty bathing yourself, getting dressed or grooming yourself? Yes   Do you have difficulty using the toilet? No   Do you have difficulty moving around from place to place? No   Do you " have trouble with steps or getting out of a bed or a chair? No   Current Diet Well Balanced Diet   Dental Exam Up to date   Eye Exam Up to date   Exercise (times per week) 0 times per week   Current Exercises Include No Regular Exercise;Walking   Do you need help using the phone?  No   Are you deaf or do you have serious difficulty hearing?  No   Do you need help to go to places out of walking distance? No   Do you need help shopping? No   Do you need help preparing meals?  No   Do you need help with housework?  Yes   Do you need help with laundry? No   Do you need help taking your medications? No   Do you need help managing money? No   Do you ever drive or ride in a car without wearing a seat belt? No   Have you felt unusual stress, anger or loneliness in the last month? No   Who do you live with? Spouse   If you need help, do you have trouble finding someone available to you? No   Have you been bothered in the last four weeks by sexual problems? No   Do you have difficulty concentrating, remembering or making decisions? No             Age-appropriate Screening Schedule:  Refer to the list below for future screening recommendations based on patient's age, sex and/or medical conditions. Orders for these recommended tests are listed in the plan section. The patient has been provided with a written plan.    Health Maintenance List  Health Maintenance   Topic Date Due    URINE MICROALBUMIN  Never done    ZOSTER VACCINE (1 of 2) Never done    DIABETIC FOOT EXAM  Never done    DXA SCAN  04/13/2024    HEMOGLOBIN A1C  10/11/2024    COVID-19 Vaccine (7 - 2023-24 season) 07/31/2024 (Originally 3/30/2024)    INFLUENZA VACCINE  08/01/2024    DIABETIC EYE EXAM  02/19/2025    LIPID PANEL  04/11/2025    MAMMOGRAM  04/15/2025    ANNUAL WELLNESS VISIT  07/29/2025    COLORECTAL CANCER SCREENING  06/05/2030    TDAP/TD VACCINES (2 - Td or Tdap) 04/11/2031    HEPATITIS C SCREENING  Completed    Pneumococcal Vaccine 65+  Completed                                                                                                                                                 CMS Preventative Services Quick Reference  Risk Factors Identified During Encounter  None Identified    The above risks/problems have been discussed with the patient.  Pertinent information has been shared with the patient in the After Visit Summary.  An After Visit Summary and PPPS were made available to the patient.    Follow Up:   Next Medicare Wellness visit to be scheduled in 1 year.     Assessment & Plan  Medicare annual wellness visit, subsequent  Updated annual wellness visit checklist.  Immunizations discussed.  Screenings discussed. Recommend yearly dental and eye exams. Also discussed monitoring of blood pressure and lipids. We addressed patient self-assessment of health status, frailty, and physical functioning. We reviewed psychosocial risks, behavioral risks, instrumental activities of daily living, and patient health risk assessment. Patient was given a personalized prevention plan.     Elevated liver enzymes  Will put in referarl for further evaluation on elevated liver enzymes; avoid alcohol; encouraged healthy diet and exercise   Primary hypertension  Hypertension is stable and controlled  Continue current treatment regimen.  Continue f/up with cardiology as directed.     Hyperlipidemia, mixed     Continue f/up with cardiology and medication as directed. Encouraged healthy diet and exercise. RTC prn.   Orders Placed This Encounter   Procedures    Ambulatory Referral to Gastroenterology             Follow Up:   No follow-ups on file.

## 2024-07-29 NOTE — ASSESSMENT & PLAN NOTE
Updated annual wellness visit checklist.  Immunizations discussed.  Screenings discussed. Recommend yearly dental and eye exams. Also discussed monitoring of blood pressure and lipids. We addressed patient self-assessment of health status, frailty, and physical functioning. We reviewed psychosocial risks, behavioral risks, instrumental activities of daily living, and patient health risk assessment. Patient was given a personalized prevention plan.

## 2024-07-29 NOTE — ASSESSMENT & PLAN NOTE
Hypertension is stable and controlled  Continue current treatment regimen.  Continue f/up with cardiology as directed.

## 2024-07-30 DIAGNOSIS — D75.839 THROMBOCYTOSIS: Primary | ICD-10-CM

## 2024-09-05 ENCOUNTER — OFFICE VISIT (OUTPATIENT)
Dept: CARDIOLOGY | Facility: CLINIC | Age: 69
End: 2024-09-05
Payer: MEDICARE

## 2024-09-05 VITALS
DIASTOLIC BLOOD PRESSURE: 76 MMHG | WEIGHT: 130.8 LBS | OXYGEN SATURATION: 99 % | BODY MASS INDEX: 24.07 KG/M2 | SYSTOLIC BLOOD PRESSURE: 124 MMHG | HEART RATE: 72 BPM | HEIGHT: 62 IN | RESPIRATION RATE: 18 BRPM

## 2024-09-05 DIAGNOSIS — R07.9 CHEST PAIN, UNSPECIFIED TYPE: Primary | ICD-10-CM

## 2024-09-05 DIAGNOSIS — E78.2 MIXED HYPERLIPIDEMIA: ICD-10-CM

## 2024-09-05 DIAGNOSIS — I10 PRIMARY HYPERTENSION: ICD-10-CM

## 2024-09-05 DIAGNOSIS — R00.2 PALPITATIONS: ICD-10-CM

## 2024-09-05 PROCEDURE — 3078F DIAST BP <80 MM HG: CPT | Performed by: INTERNAL MEDICINE

## 2024-09-05 PROCEDURE — 99214 OFFICE O/P EST MOD 30 MIN: CPT | Performed by: INTERNAL MEDICINE

## 2024-09-05 PROCEDURE — 1159F MED LIST DOCD IN RCRD: CPT | Performed by: INTERNAL MEDICINE

## 2024-09-05 PROCEDURE — 3074F SYST BP LT 130 MM HG: CPT | Performed by: INTERNAL MEDICINE

## 2024-09-05 PROCEDURE — 1160F RVW MEDS BY RX/DR IN RCRD: CPT | Performed by: INTERNAL MEDICINE

## 2024-09-05 RX ORDER — ICOSAPENT ETHYL 1 G/1
2 CAPSULE ORAL 2 TIMES DAILY WITH MEALS
Qty: 120 CAPSULE | Refills: 11 | Status: SHIPPED | OUTPATIENT
Start: 2024-09-05

## 2024-09-05 NOTE — ASSESSMENT & PLAN NOTE
No recurrence. Patient with risk factors: Age for gender, mixed hyperlipidemia, uncontrolled hypertension, past smoking. Awaiting treadmill stress test, on hold for now in view of weakness/muscle pain. Schedule stress test in 1 month to allow for recovery.  If recurrence of chest pains in the meantime, consider Lexiscan nuclear stress test.

## 2024-09-05 NOTE — ASSESSMENT & PLAN NOTE
No recurrence. Lasting for hours, regular rhythm.  Holter monitor 48 hours showing atrial tachycardia, not reported as symptomatic.  Transthoracic echocardiogram with normal ejection fraction mild MR and age-appropriate diastolic function after imaging review.  Ionized calcium normal.  Plan:  Continue metoprolol succinate ER 25 mg p.o. daily in p.m. for arrhythmia prevention  Keep magnesium more than 2 and potassium more than 4

## 2024-09-05 NOTE — PROGRESS NOTES
"[facility]  [facility]  [address] [place] [address]  Dept: [phone]  Dept Fax: [phone]    Date: 09/05/2024  Patient: Linda Good  YOB: 1955    Follow Up Office Visit Note    Interval Follow-up  Ms. Linda Good is a 69 y.o. female who is here for follow-up on Hypertension and Hyperlipidemia.    Subjective   Patient still complaining of arthralgias in the joints upper extremities mainly the shoulders and proximal arms, as well as lower back and hips.  Some days are better than others.  Patient denies angina, orthopnea, PND, palpitations, lightheadedness, syncope or medications side-effects.  Exercise capacity limited by joint pain.    The following portions of the patient's history were reviewed and updated as appropriate: allergies, current medications, past family history, past medical history, past social history, past surgical history, and problem list.    Medications:   Allergies   Allergen Reactions    Fenofibrate Myalgia     Arthralgia      Current Outpatient Medications   Medication Instructions    ascorbic acid (VITAMIN C) 1,000 mg, Oral, Daily    cholecalciferol (VITAMIN D3) 1,000 Units, Oral, Daily    cyanocobalamin (VITAMIN B-12) 500 mcg, Oral, Daily    icosapent ethyl (VASCEPA) 2 g, Oral, 2 Times Daily With Meals    metoprolol succinate XL (TOPROL-XL) 25 mg, Oral, Daily    vitamin B-1 50 mg, Oral, Daily    Vitamin E 200 Units, Oral, Daily       Tobacco Use: Medium Risk (9/5/2024)    Patient History     Smoking Tobacco Use: Former     Smokeless Tobacco Use: Never     Passive Exposure: Not on file        Objective  Vitals:    09/05/24 1323   BP: 124/76   Pulse: 72   Resp: 18   SpO2: 99%   Weight: 59.3 kg (130 lb 12.8 oz)   Height: 157.5 cm (62\")   PainSc:   6   PainLoc: Arm      Vitals:    09/05/24 1323   BP: 124/76   Pulse: 72   Resp: 18   SpO2: 99%   Weight: 59.3 kg (130 lb 12.8 oz)   Height: 157.5 cm (62\")          Physical " "Exam  Constitutional:       Appearance: Not in distress.   Neck:      Vascular: JVD normal.   Pulmonary:      Breath sounds: Normal breath sounds.   Cardiovascular:      Normal rate. Regular rhythm.      Murmurs: There is no murmur.   Pulses:     Intact distal pulses.   Edema:     Peripheral edema absent.   Neurological:      Mental Status: Alert.              Diagnostic Data  Lab Results   Component Value Date     07/18/2024    K 5.0 07/18/2024     07/18/2024    CO2 23 07/18/2024    MG 2.0 05/25/2024    BUN 15 07/18/2024    CREATININE 0.97 07/18/2024    CALCIUM 10.5 (H) 07/18/2024    BILITOT 0.2 07/19/2024    ALKPHOS 44 (L) 07/19/2024    ALT 77 (H) 07/19/2024    AST 72 (H) 07/19/2024    GLUCOSE 96 07/18/2024    ALBUMIN 5.5 (H) 07/19/2024     Lab Results   Component Value Date    WBC 6.14 07/19/2024    HGB 13.5 07/19/2024    HCT 43.1 07/19/2024     (H) 07/19/2024     No results found for: \"APTT\", \"INR\", \"PTT\"  Lab Results   Component Value Date    CKTOTAL 120 05/21/2024     No results found for: \"BNP\", \"PROBNP\"    Lab Results   Component Value Date    CHLPL 174 04/11/2024    TRIG 519 (H) 04/11/2024    HDL 64 04/11/2024    LDL 38 04/11/2024     Lab Results   Component Value Date    TSH 2.020 05/20/2024    FREET4 1.25 05/20/2024       CV Diagnostics:  Procedures    CXR: Results for orders placed in visit on 06/03/24    XR Chest PA & Lateral    Narrative  XR CHEST PA AND LATERAL    Date of Exam: 6/3/2024 11:30 AM EDT    Indication: F/U on pneumonia from 5/22, leukocytosis    Comparison: 5/24/2024.    Findings:  Aeration is significantly improved from comparison without persistent focal opacity. There is no effusion or pneumothorax. Normal heart and mediastinal contours.    Impression  Impression:  Improved aeration and resolution of prior opacities, without current evidence of acute disease.      Electronically Signed: Ruperto Chavez MD  6/3/2024 4:08 PM EDT  Workstation ID: [id number]     ECHO/MUGA: " Results for orders placed during the hospital encounter of 05/20/24    Adult Transthoracic Echo Complete W/ Cont if Necessary Per Protocol    Interpretation Summary    Left ventricular systolic function is normal. Left ventricular ejection fraction appears to be 61 - 65%.    Mild mitral valve regurgitation is present.    Mild tricuspid valve regurgitation is present.    Estimated right ventricular systolic pressure from tricuspid regurgitation is normal (<35 mmHg).     STRESS TESTS: No results found for this or any previous visit.     CARDIAC CATH: No results found for this or any previous visit.     DEVICES: No valid procedures specified.   HOLTER: Results for orders placed in visit on 04/22/24    Holter Monitor - 48 Hour    Interpretation Summary    An abnormal monitor study.    No events reported by patient button press/no diary provided.    There were 2 episodes of atrial tachycardia recorded, longest/fastest 8 beats [average heart rate 136 bpm, max heart rate 171 bpm], likely asymptomatic since not recorded by button press/on event log.    There was no atrial fibrillation, VT, cardiac pauses or heart blocks detected.     CT/MRI:  Results for orders placed during the hospital encounter of 05/20/24    CT Chest Without Contrast Diagnostic    Narrative  CT CHEST WO CONTRAST DIAGNOSTIC, CT ABDOMEN PELVIS W CONTRAST    Date of Exam: 5/22/2024 12:11 PM EDT    Indication: cough. Abdominal pain    Comparison: None available.    Technique: Axial CT images were obtained of the chest without contrast administration. Axial CT images were obtained of the abdomen and pelvis after intravenous administration of 75 cc Isovue-300. Reconstructed coronal and sagittal images were also  obtained. Automated exposure control and iterative construction methods were used.      Findings:    CT CHEST:  MEDIASTINUM: Unremarkable. Aortic and heart size are normal. No mass nor pericardial effusion.  CORONARY ARTERIES: No calcified  atherosclerotic disease.  LUNGS: There is nodular and more confluent airspace disease within the lower lungs bilaterally, left more so than right. There is a degree of interlobular septal thickening/interstitial edema. Imaging features are most suggestive of multifocal pneumonia.  No single suspicious nodule is identified.  PLEURAL SPACE: There are small bilateral pleural effusions.        CT ABDOMEN AND PELVIS:  LIVER:  Unremarkable parenchyma without focal lesion.  BILIARY/GALLBLADDER:  Unremarkable  SPLEEN:  Unremarkable  PANCREAS:  Unremarkable  ADRENAL:  Unremarkable  KIDNEYS:  Unremarkable parenchyma with no solid mass identified. No obstruction.  No calculus identified.  GASTROINTESTINAL/MESENTERY:  No evidence of obstruction nor inflammation. The appendix is normal.  AORTA/IVC:  Normal caliber.    RETROPERITONEUM/LYMPH NODES:  Unremarkable    REPRODUCTIVE: There is a right-sided uterine fibroid. There is minimal free fluid in the pelvis, a frequent normal finding in females. No definitive etiology identified.  BLADDER:  Unremarkable    OSSEUS STRUCTURES:  Typical for age with no acute process identified.    Impression  Impression:  1.Multifocal pneumonia with small bilateral parapneumonic effusions.  2.Minimal nonspecific free fluid in the pelvis, a frequent normal finding in females as no etiology identified.  3.Other incidental nonemergent findings as detailed above.        Electronically Signed: Reid Banerjee MD  5/22/2024 12:48 PM EDT  Workstation ID: [id number]    VASCULAR: No valid procedures specified.     Assessment and Plan  Diagnoses and all orders for this visit:    1. Chest pain, unspecified type (Primary)  Assessment & Plan:  No recurrence. Patient with risk factors: Age for gender, mixed hyperlipidemia, uncontrolled hypertension, past smoking. Awaiting treadmill stress test, on hold for now in view of weakness/muscle pain. Schedule stress test in 1 month to allow for recovery.  If recurrence of  chest pains in the meantime, consider Lexiscan nuclear stress test.       2. Primary hypertension  Assessment & Plan:  Hypertension is stable and controlled  Continue current treatment regimen.  Regular aerobic exercise.  Ambulatory blood pressure monitoring.  Drop in blood pressure triggered by watching someone in pain on TV.  Likely vasovagal since BP well-controlled on low-dose metoprolol on a day-to-day basis.  Encourage hydration and frequent meals.  Encouraged to hydrate, with frequent meals.  Discussed management of vasovagal events.  Blood pressure will be reassessed in 6 months.      3. Mixed hyperlipidemia  Assessment & Plan:   Lipid abnormalities are improving on medical therapy, nonetheless persistence of abnormal LFTs and joint pains despite stopping rosuvastatin.     Plan:  Discontinue rosuvastatin and fenofibrate.  Start Vascepa 2 g twice daily for high triglycerides.  Lipid profile today.  Patient counseled in regards to heart healthy, low fat/ low cholesterol/low sat diet, daily exercise for 30 minutes, low to moderate intensity, and weight loss.     Patient Treatment Goals:   LDL goal is less than 70  Triglycerides goal less than 200     Followup in 3 months    Orders:  -     icosapent ethyl (Vascepa) 1 g capsule capsule; Take 2 g by mouth 2 (Two) Times a Day With Meals.  Dispense: 120 capsule; Refill: 11  -     Lipid Panel  -     Magnesium  -     Comprehensive Metabolic Panel  -     CBC & Differential  -     Immunofixation, Serum    4. Palpitations  Assessment & Plan:  No recurrence. Lasting for hours, regular rhythm.  Holter monitor 48 hours showing atrial tachycardia, not reported as symptomatic.  Transthoracic echocardiogram with normal ejection fraction mild MR and age-appropriate diastolic function after imaging review.  Ionized calcium normal.  Plan:  Continue metoprolol succinate ER 25 mg p.o. daily in p.m. for arrhythmia prevention  Keep magnesium more than 2 and potassium more than  4           Return in about 3 months (around 12/5/2024) for Follow-up with Dr Alvares.    There are no Patient Instructions on file for this visit.    Josef Alvares MD

## 2024-09-05 NOTE — ASSESSMENT & PLAN NOTE
Hypertension is stable and controlled  Continue current treatment regimen.  Regular aerobic exercise.  Ambulatory blood pressure monitoring.  Drop in blood pressure triggered by watching someone in pain on TV.  Likely vasovagal since BP well-controlled on low-dose metoprolol on a day-to-day basis.  Encourage hydration and frequent meals.  Encouraged to hydrate, with frequent meals.  Discussed management of vasovagal events.  Blood pressure will be reassessed in 6 months.

## 2024-09-05 NOTE — ASSESSMENT & PLAN NOTE
Lipid abnormalities are improving on medical therapy, nonetheless persistence of abnormal LFTs and joint pains despite stopping rosuvastatin.     Plan:  Discontinue rosuvastatin and fenofibrate.  Start Vascepa 2 g twice daily for high triglycerides.  Lipid profile today.  Patient counseled in regards to heart healthy, low fat/ low cholesterol/low sat diet, daily exercise for 30 minutes, low to moderate intensity, and weight loss.     Patient Treatment Goals:   LDL goal is less than 70  Triglycerides goal less than 200     Followup in 3 months

## 2024-09-06 LAB
ALBUMIN SERPL-MCNC: 5.1 G/DL (ref 3.9–4.9)
ALP SERPL-CCNC: 38 IU/L (ref 44–121)
ALT SERPL-CCNC: 31 IU/L (ref 0–32)
AST SERPL-CCNC: 36 IU/L (ref 0–40)
BASOPHILS # BLD AUTO: 0.1 X10E3/UL (ref 0–0.2)
BASOPHILS NFR BLD AUTO: 1 %
BILIRUB SERPL-MCNC: 0.2 MG/DL (ref 0–1.2)
BUN SERPL-MCNC: 23 MG/DL (ref 8–27)
BUN/CREAT SERPL: 20 (ref 12–28)
CALCIUM SERPL-MCNC: 10.5 MG/DL (ref 8.7–10.3)
CHLORIDE SERPL-SCNC: 101 MMOL/L (ref 96–106)
CHOLEST SERPL-MCNC: 217 MG/DL (ref 100–199)
CO2 SERPL-SCNC: 24 MMOL/L (ref 20–29)
CREAT SERPL-MCNC: 1.13 MG/DL (ref 0.57–1)
EGFRCR SERPLBLD CKD-EPI 2021: 53 ML/MIN/1.73
EOSINOPHIL # BLD AUTO: 0.1 X10E3/UL (ref 0–0.4)
EOSINOPHIL NFR BLD AUTO: 1 %
ERYTHROCYTE [DISTWIDTH] IN BLOOD BY AUTOMATED COUNT: 12.4 % (ref 11.7–15.4)
GLOBULIN SER CALC-MCNC: 2.6 G/DL (ref 1.5–4.5)
GLUCOSE SERPL-MCNC: 93 MG/DL (ref 70–99)
HCT VFR BLD AUTO: 43 % (ref 34–46.6)
HDLC SERPL-MCNC: 75 MG/DL
HGB BLD-MCNC: 14.1 G/DL (ref 11.1–15.9)
IGA SERPL-MCNC: 89 MG/DL (ref 87–352)
IGG SERPL-MCNC: 841 MG/DL (ref 586–1602)
IGM SERPL-MCNC: 116 MG/DL (ref 26–217)
IMM GRANULOCYTES # BLD AUTO: 0 X10E3/UL (ref 0–0.1)
IMM GRANULOCYTES NFR BLD AUTO: 0 %
LDLC SERPL CALC-MCNC: 125 MG/DL (ref 0–99)
LYMPHOCYTES # BLD AUTO: 1.7 X10E3/UL (ref 0.7–3.1)
LYMPHOCYTES NFR BLD AUTO: 19 %
MAGNESIUM SERPL-MCNC: 2.1 MG/DL (ref 1.6–2.3)
MCH RBC QN AUTO: 30.7 PG (ref 26.6–33)
MCHC RBC AUTO-ENTMCNC: 32.8 G/DL (ref 31.5–35.7)
MCV RBC AUTO: 94 FL (ref 79–97)
MONOCYTES # BLD AUTO: 0.7 X10E3/UL (ref 0.1–0.9)
MONOCYTES NFR BLD AUTO: 8 %
NEUTROPHILS # BLD AUTO: 6.3 X10E3/UL (ref 1.4–7)
NEUTROPHILS NFR BLD AUTO: 71 %
PLATELET # BLD AUTO: 465 X10E3/UL (ref 150–450)
POTASSIUM SERPL-SCNC: 4 MMOL/L (ref 3.5–5.2)
PROT PATTERN SERPL IFE-IMP: NORMAL
PROT SERPL-MCNC: 7.7 G/DL (ref 6–8.5)
RBC # BLD AUTO: 4.6 X10E6/UL (ref 3.77–5.28)
SODIUM SERPL-SCNC: 141 MMOL/L (ref 134–144)
TRIGL SERPL-MCNC: 100 MG/DL (ref 0–149)
VLDLC SERPL CALC-MCNC: 17 MG/DL (ref 5–40)
WBC # BLD AUTO: 8.9 X10E3/UL (ref 3.4–10.8)

## 2024-09-10 ENCOUNTER — TELEPHONE (OUTPATIENT)
Dept: CARDIOLOGY | Facility: CLINIC | Age: 69
End: 2024-09-10
Payer: MEDICARE

## 2024-09-10 NOTE — TELEPHONE ENCOUNTER
----- Message from Josef Alvares sent at 9/8/2024  4:45 PM EDT -----  Please inform patient that her blood work results showed:  1.  Her cholesterol has markedly worsened as expected of the cholesterol pill/rosuvastatin  2.  Her triglycerides significantly improved at 100/normal range on fenofibrate that was just stopped recently  3.  Her liver tests are back to normal  4.  Her kidney function showed slight increase in creatinine, likely dehydrated for the test or lab error   Please check with patient if the pains in her joints are improving of fenofibrate.  Please also ask patient to stay well-hydrated, small frequent meals and plenty of water, and she will recheck her blood work with her upcoming primary care visit in October.  Thank you!

## 2024-09-11 ENCOUNTER — TELEPHONE (OUTPATIENT)
Dept: CARDIOLOGY | Facility: CLINIC | Age: 69
End: 2024-09-11
Payer: MEDICARE

## 2024-09-11 NOTE — TELEPHONE ENCOUNTER
When she was in the hospital in May 2024 she was started on amlodipine 5mg take 1/2 tablet. She is needing refills sent to [facility] in [place].

## 2024-09-13 RX ORDER — AMLODIPINE BESYLATE 5 MG/1
5 TABLET ORAL DAILY
Qty: 90 TABLET | Refills: 3 | Status: SHIPPED | OUTPATIENT
Start: 2024-09-13

## 2024-09-16 ENCOUNTER — LAB (OUTPATIENT)
Facility: HOSPITAL | Age: 69
End: 2024-09-16
Payer: MEDICARE

## 2024-09-16 ENCOUNTER — TELEPHONE (OUTPATIENT)
Dept: CARDIOLOGY | Facility: CLINIC | Age: 69
End: 2024-09-16
Payer: MEDICARE

## 2024-09-16 ENCOUNTER — OFFICE VISIT (OUTPATIENT)
Age: 69
End: 2024-09-16
Payer: MEDICARE

## 2024-09-16 VITALS
TEMPERATURE: 97.3 F | BODY MASS INDEX: 24.33 KG/M2 | HEIGHT: 62 IN | WEIGHT: 132.2 LBS | DIASTOLIC BLOOD PRESSURE: 78 MMHG | SYSTOLIC BLOOD PRESSURE: 130 MMHG | HEART RATE: 77 BPM

## 2024-09-16 DIAGNOSIS — M25.50 ARTHRALGIA, UNSPECIFIED JOINT: ICD-10-CM

## 2024-09-16 DIAGNOSIS — R53.83 FATIGUE, UNSPECIFIED TYPE: ICD-10-CM

## 2024-09-16 DIAGNOSIS — M15.9 PRIMARY OSTEOARTHRITIS INVOLVING MULTIPLE JOINTS: Chronic | ICD-10-CM

## 2024-09-16 DIAGNOSIS — M25.50 ARTHRALGIA, UNSPECIFIED JOINT: Primary | ICD-10-CM

## 2024-09-16 DIAGNOSIS — M15.9 PRIMARY OSTEOARTHRITIS INVOLVING MULTIPLE JOINTS: ICD-10-CM

## 2024-09-16 PROBLEM — M15.0 PRIMARY OSTEOARTHRITIS INVOLVING MULTIPLE JOINTS: Chronic | Status: ACTIVE | Noted: 2024-09-16

## 2024-09-16 PROCEDURE — 1159F MED LIST DOCD IN RCRD: CPT | Performed by: INTERNAL MEDICINE

## 2024-09-16 PROCEDURE — 86140 C-REACTIVE PROTEIN: CPT

## 2024-09-16 PROCEDURE — 83516 IMMUNOASSAY NONANTIBODY: CPT

## 2024-09-16 PROCEDURE — 83520 IMMUNOASSAY QUANT NOS NONAB: CPT

## 2024-09-16 PROCEDURE — 3078F DIAST BP <80 MM HG: CPT | Performed by: INTERNAL MEDICINE

## 2024-09-16 PROCEDURE — 36415 COLL VENOUS BLD VENIPUNCTURE: CPT

## 2024-09-16 PROCEDURE — 85652 RBC SED RATE AUTOMATED: CPT

## 2024-09-16 PROCEDURE — 3075F SYST BP GE 130 - 139MM HG: CPT | Performed by: INTERNAL MEDICINE

## 2024-09-16 PROCEDURE — 86800 THYROGLOBULIN ANTIBODY: CPT

## 2024-09-16 PROCEDURE — 82550 ASSAY OF CK (CPK): CPT

## 2024-09-16 PROCEDURE — 1160F RVW MEDS BY RX/DR IN RCRD: CPT | Performed by: INTERNAL MEDICINE

## 2024-09-16 PROCEDURE — 80074 ACUTE HEPATITIS PANEL: CPT

## 2024-09-16 PROCEDURE — 86160 COMPLEMENT ANTIGEN: CPT

## 2024-09-16 PROCEDURE — 85025 COMPLETE CBC W/AUTO DIFF WBC: CPT

## 2024-09-16 PROCEDURE — 81001 URINALYSIS AUTO W/SCOPE: CPT

## 2024-09-16 PROCEDURE — 86200 CCP ANTIBODY: CPT

## 2024-09-16 PROCEDURE — 84443 ASSAY THYROID STIM HORMONE: CPT

## 2024-09-16 PROCEDURE — 80053 COMPREHEN METABOLIC PANEL: CPT

## 2024-09-16 PROCEDURE — 86480 TB TEST CELL IMMUN MEASURE: CPT

## 2024-09-16 PROCEDURE — 86038 ANTINUCLEAR ANTIBODIES: CPT

## 2024-09-16 PROCEDURE — 84439 ASSAY OF FREE THYROXINE: CPT

## 2024-09-16 PROCEDURE — 86376 MICROSOMAL ANTIBODY EACH: CPT

## 2024-09-16 PROCEDURE — 86431 RHEUMATOID FACTOR QUANT: CPT

## 2024-09-16 PROCEDURE — 86037 ANCA TITER EACH ANTIBODY: CPT

## 2024-09-16 PROCEDURE — 82085 ASSAY OF ALDOLASE: CPT

## 2024-09-16 PROCEDURE — 99204 OFFICE O/P NEW MOD 45 MIN: CPT | Performed by: INTERNAL MEDICINE

## 2024-09-17 LAB
ALBUMIN SERPL-MCNC: 5.1 G/DL (ref 3.5–5.2)
ALBUMIN/GLOB SERPL: 1.8 G/DL
ALP SERPL-CCNC: 46 U/L (ref 39–117)
ALT SERPL W P-5'-P-CCNC: 22 U/L (ref 1–33)
ANION GAP SERPL CALCULATED.3IONS-SCNC: 14.1 MMOL/L (ref 5–15)
AST SERPL-CCNC: 22 U/L (ref 1–32)
BACTERIA UR QL AUTO: NORMAL /HPF
BASOPHILS # BLD AUTO: 0.06 10*3/MM3 (ref 0–0.2)
BASOPHILS NFR BLD AUTO: 0.7 % (ref 0–1.5)
BILIRUB SERPL-MCNC: <0.2 MG/DL (ref 0–1.2)
BILIRUB UR QL STRIP: NEGATIVE
BUN SERPL-MCNC: 19 MG/DL (ref 8–23)
BUN/CREAT SERPL: 20.9 (ref 7–25)
C3 SERPL-MCNC: 153 MG/DL (ref 82–167)
C4 SERPL-MCNC: 24 MG/DL (ref 14–44)
CALCIUM SPEC-SCNC: 10.8 MG/DL (ref 8.6–10.5)
CHLORIDE SERPL-SCNC: 103 MMOL/L (ref 98–107)
CK SERPL-CCNC: 48 U/L (ref 20–180)
CLARITY UR: CLEAR
CO2 SERPL-SCNC: 24.9 MMOL/L (ref 22–29)
COLOR UR: YELLOW
CREAT SERPL-MCNC: 0.91 MG/DL (ref 0.57–1)
CRP SERPL-MCNC: 0.3 MG/DL (ref 0–0.5)
DEPRECATED RDW RBC AUTO: 41.7 FL (ref 37–54)
EGFRCR SERPLBLD CKD-EPI 2021: 69.3 ML/MIN/1.73
EOSINOPHIL # BLD AUTO: 0.19 10*3/MM3 (ref 0–0.4)
EOSINOPHIL NFR BLD AUTO: 2.4 % (ref 0.3–6.2)
ERYTHROCYTE [DISTWIDTH] IN BLOOD BY AUTOMATED COUNT: 12.4 % (ref 12.3–15.4)
ERYTHROCYTE [SEDIMENTATION RATE] IN BLOOD: 21 MM/HR (ref 0–30)
GLOBULIN UR ELPH-MCNC: 2.8 GM/DL
GLUCOSE SERPL-MCNC: 85 MG/DL (ref 65–99)
GLUCOSE UR STRIP-MCNC: NEGATIVE MG/DL
HAV IGM SERPL QL IA: NORMAL
HBV CORE IGM SERPL QL IA: NORMAL
HBV SURFACE AG SERPL QL IA: NORMAL
HCT VFR BLD AUTO: 43.2 % (ref 34–46.6)
HCV AB SER QL: NORMAL
HGB BLD-MCNC: 14.4 G/DL (ref 12–15.9)
HGB UR QL STRIP.AUTO: NEGATIVE
HOLD SPECIMEN: NORMAL
HYALINE CASTS UR QL AUTO: NORMAL /LPF
IMM GRANULOCYTES # BLD AUTO: 0.02 10*3/MM3 (ref 0–0.05)
IMM GRANULOCYTES NFR BLD AUTO: 0.2 % (ref 0–0.5)
KETONES UR QL STRIP: NEGATIVE
LEUKOCYTE ESTERASE UR QL STRIP.AUTO: ABNORMAL
LYMPHOCYTES # BLD AUTO: 2.35 10*3/MM3 (ref 0.7–3.1)
LYMPHOCYTES NFR BLD AUTO: 29.1 % (ref 19.6–45.3)
MCH RBC QN AUTO: 31.2 PG (ref 26.6–33)
MCHC RBC AUTO-ENTMCNC: 33.3 G/DL (ref 31.5–35.7)
MCV RBC AUTO: 93.7 FL (ref 79–97)
MONOCYTES # BLD AUTO: 0.54 10*3/MM3 (ref 0.1–0.9)
MONOCYTES NFR BLD AUTO: 6.7 % (ref 5–12)
NEUTROPHILS NFR BLD AUTO: 4.91 10*3/MM3 (ref 1.7–7)
NEUTROPHILS NFR BLD AUTO: 60.9 % (ref 42.7–76)
NITRITE UR QL STRIP: NEGATIVE
NRBC BLD AUTO-RTO: 0 /100 WBC (ref 0–0.2)
PH UR STRIP.AUTO: 7 [PH] (ref 5–8)
PLATELET # BLD AUTO: 404 10*3/MM3 (ref 140–450)
PMV BLD AUTO: 10.7 FL (ref 6–12)
POTASSIUM SERPL-SCNC: 3.9 MMOL/L (ref 3.5–5.2)
PROT SERPL-MCNC: 7.9 G/DL (ref 6–8.5)
PROT UR QL STRIP: NEGATIVE
RBC # BLD AUTO: 4.61 10*6/MM3 (ref 3.77–5.28)
RBC # UR STRIP: NORMAL /HPF
REF LAB TEST METHOD: NORMAL
SODIUM SERPL-SCNC: 142 MMOL/L (ref 136–145)
SP GR UR STRIP: 1.02 (ref 1–1.03)
SQUAMOUS #/AREA URNS HPF: NORMAL /HPF
T4 FREE SERPL-MCNC: 1.11 NG/DL (ref 0.92–1.68)
TSH SERPL DL<=0.05 MIU/L-ACNC: 2.48 UIU/ML (ref 0.27–4.2)
UROBILINOGEN UR QL STRIP: ABNORMAL
WBC # UR STRIP: NORMAL /HPF
WBC NRBC COR # BLD AUTO: 8.07 10*3/MM3 (ref 3.4–10.8)

## 2024-09-17 RX ORDER — AMLODIPINE BESYLATE 5 MG/1
2.5 TABLET ORAL DAILY
Qty: 90 TABLET | Refills: 3 | Status: SHIPPED | OUTPATIENT
Start: 2024-09-17

## 2024-09-18 ENCOUNTER — TELEPHONE (OUTPATIENT)
Age: 69
End: 2024-09-18
Payer: MEDICARE

## 2024-09-18 LAB
ALDOLASE SERPL-CCNC: 4.1 U/L (ref 3.3–10.3)
ANA SER QL IF: NEGATIVE
CCP IGA+IGG SERPL IA-ACNC: 6 UNITS (ref 0–19)
THYROGLOB AB SERPL-ACNC: <1 IU/ML (ref 0–0.9)
THYROPEROXIDASE AB SERPL-ACNC: 11 IU/ML (ref 0–34)

## 2024-09-18 RX ORDER — METHYLPREDNISOLONE 4 MG
TABLET, DOSE PACK ORAL
Qty: 21 TABLET | Refills: 0 | Status: SHIPPED | OUTPATIENT
Start: 2024-09-18

## 2024-09-19 LAB
C-ANCA TITR SER IF: NORMAL TITER
GAMMA INTERFERON BACKGROUND BLD IA-ACNC: 0.01 IU/ML
M TB IFN-G BLD-IMP: NEGATIVE
M TB IFN-G CD4+ BCKGRND COR BLD-ACNC: 0 IU/ML
M TB IFN-G CD4+CD8+ BCKGRND COR BLD-ACNC: 0.01 IU/ML
MITOGEN IGNF BCKGRD COR BLD-ACNC: >10 IU/ML
MYELOPEROXIDASE AB SER IA-ACNC: <0.2 UNITS (ref 0–0.9)
P-ANCA ATYPICAL TITR SER IF: NORMAL TITER
P-ANCA TITR SER IF: NORMAL TITER
PROTEINASE3 AB SER IA-ACNC: <0.2 UNITS (ref 0–0.9)
QUANTIFERON INCUBATION: NORMAL
SERVICE CMNT-IMP: NORMAL

## 2024-09-23 LAB — HLA-B27 QL NAA+PROBE: NEGATIVE

## 2024-09-25 LAB
RF IGA SER-ACNC: <7 U
RF IGG SER-ACNC: <7 U
RF IGM SER IA-ACNC: <7 U

## 2024-09-27 LAB — 14-3-3 ETA AG SER IA-MCNC: <0.2 NG/ML

## 2024-10-03 LAB
ANA SER QL IF: NEGATIVE
ENA SS-A AB SER IA-ACNC: <20 UNITS

## 2024-10-28 ENCOUNTER — OFFICE VISIT (OUTPATIENT)
Dept: FAMILY MEDICINE CLINIC | Facility: CLINIC | Age: 69
End: 2024-10-28
Payer: MEDICARE

## 2024-10-28 VITALS
WEIGHT: 137 LBS | SYSTOLIC BLOOD PRESSURE: 136 MMHG | OXYGEN SATURATION: 97 % | HEIGHT: 62 IN | BODY MASS INDEX: 25.21 KG/M2 | DIASTOLIC BLOOD PRESSURE: 80 MMHG | HEART RATE: 75 BPM

## 2024-10-28 DIAGNOSIS — E83.52 HYPERCALCEMIA: Primary | ICD-10-CM

## 2024-10-28 PROCEDURE — 3075F SYST BP GE 130 - 139MM HG: CPT | Performed by: PHYSICIAN ASSISTANT

## 2024-10-28 PROCEDURE — G0008 ADMIN INFLUENZA VIRUS VAC: HCPCS | Performed by: PHYSICIAN ASSISTANT

## 2024-10-28 PROCEDURE — 1125F AMNT PAIN NOTED PAIN PRSNT: CPT | Performed by: PHYSICIAN ASSISTANT

## 2024-10-28 PROCEDURE — 90662 IIV NO PRSV INCREASED AG IM: CPT | Performed by: PHYSICIAN ASSISTANT

## 2024-10-28 PROCEDURE — 3079F DIAST BP 80-89 MM HG: CPT | Performed by: PHYSICIAN ASSISTANT

## 2024-10-28 PROCEDURE — 3044F HG A1C LEVEL LT 7.0%: CPT | Performed by: PHYSICIAN ASSISTANT

## 2024-10-28 PROCEDURE — 99213 OFFICE O/P EST LOW 20 MIN: CPT | Performed by: PHYSICIAN ASSISTANT

## 2024-10-28 RX ORDER — FENOFIBRATE 145 MG/1
TABLET, COATED ORAL
COMMUNITY
Start: 2024-10-19 | End: 2024-10-28

## 2024-10-28 NOTE — PROGRESS NOTES
"Chief Complaint  Follow-up    Subjective          Linda Good presents to [facility] PRIMARY CARE  History of Present Illness  Patient in today to f/up on labs. She has had quite a few labs through rheumatology and cardiology in past 2 months. Her calcium level showed mild elevation. She states is not taking any calcium supplements at this time. She continues to have intermittent joint pain- has follow up with rheumatology next month. Labs were negative for rheumatoid arthritis and autoimmune screening. She also will be following with cardiology next month. She states is currently utd on mammogram.       Objective   Vital Signs:   /80   Pulse 75   Ht 157.5 cm (62\")   Wt 62.1 kg (137 lb)   SpO2 97%   BMI 25.06 kg/m²     Body mass index is 25.06 kg/m².    Review of Systems   Constitutional:  Negative for fatigue and fever.   HENT:  Negative for congestion and sore throat.    Respiratory:  Negative for cough and shortness of breath.    Cardiovascular:  Negative for chest pain.   Gastrointestinal:  Negative for abdominal pain, diarrhea, nausea and vomiting.   Neurological:  Negative for dizziness and headache.   Psychiatric/Behavioral:  Negative for depressed mood. The patient is not nervous/anxious.        Past History:  Medical History: has a past medical history of Diabetes mellitus (3/1/1993), Hyperlipidemia, Hypertension, Pneumonia (5/23/24), and Pregnancy.   Surgical History: has a past surgical history that includes Breast surgery (1997); Tubal ligation (1993); Colonoscopy; and Interventional radiology procedure (N/A, 05/20/2024).   Family History: family history includes Cancer in her father; Coronary artery disease in her sister; Diabetes in her mother; Heart disease in her mother; Lung cancer in her father.   Social History: reports that she quit smoking about 22 years ago. Her smoking use included cigarettes. She started smoking about 49 years ago. She has a 13.5 pack-year " smoking history. She has never used smokeless tobacco. She reports that she does not currently use alcohol after a past usage of about 10.0 standard drinks of alcohol per week. She reports that she does not use drugs.      Current Outpatient Medications:     amLODIPine (NORVASC) 5 MG tablet, Take 0.5 tablets by mouth Daily., Disp: 90 tablet, Rfl: 3    ascorbic acid (VITAMIN C) 1000 MG tablet, Take 1 tablet by mouth Daily., Disp: , Rfl:     Cholecalciferol 25 MCG (1000 UT) tablet, Take 1 tablet by mouth Daily., Disp: , Rfl:     cyanocobalamin (VITAMIN B-12) 500 MCG tablet, Take 1 tablet by mouth Daily., Disp: , Rfl:     icosapent ethyl (Vascepa) 1 g capsule capsule, Take 2 g by mouth 2 (Two) Times a Day With Meals., Disp: 120 capsule, Rfl: 11    metoprolol succinate XL (TOPROL-XL) 25 MG 24 hr tablet, Take 1 tablet by mouth Daily., Disp: 90 tablet, Rfl: 3    Thiamine HCl (vitamin B-1) 50 MG tablet, Take 1 tablet by mouth Daily., Disp: , Rfl:     Vitamin E 90 MG (200 UNIT) capsule, Take 200 Units by mouth Daily., Disp: , Rfl:   Allergies: Fenofibrate and Rosuvastatin    Physical Exam  Constitutional:       Appearance: Normal appearance.   HENT:      Right Ear: Tympanic membrane normal.      Left Ear: Tympanic membrane normal.      Mouth/Throat:      Mouth: Mucous membranes are moist.   Eyes:      Pupils: Pupils are equal, round, and reactive to light.   Cardiovascular:      Rate and Rhythm: Normal rate and regular rhythm.      Heart sounds: Normal heart sounds.   Pulmonary:      Effort: Pulmonary effort is normal.      Breath sounds: Normal breath sounds.   Neurological:      Mental Status: She is alert and oriented to person, place, and time.   Psychiatric:         Mood and Affect: Mood normal.         Behavior: Behavior normal.             Assessment and Plan   Diagnoses and all orders for this visit:    1. Hypercalcemia (Primary)  -     Comprehensive Metabolic Panel  -     PTH, Intact  Will check CMP and PTH today  for recurrent mild elevation to calcium level. RTC prior to recheck as needed.   Other orders  -     Fluzone High-Dose 65+yrs            Follow Up   No follow-ups on file.  Patient was given instructions and counseling regarding her condition or for health maintenance advice. Please see specific information pulled into the AVS if appropriate.     Julissa Vernon PA-C

## 2024-10-29 LAB
ALBUMIN SERPL-MCNC: 4.6 G/DL (ref 3.9–4.9)
ALP SERPL-CCNC: 60 IU/L (ref 44–121)
ALT SERPL-CCNC: 26 IU/L (ref 0–32)
AST SERPL-CCNC: 19 IU/L (ref 0–40)
BILIRUB SERPL-MCNC: 0.3 MG/DL (ref 0–1.2)
BUN SERPL-MCNC: 12 MG/DL (ref 8–27)
BUN/CREAT SERPL: 16 (ref 12–28)
CALCIUM SERPL-MCNC: 10.1 MG/DL (ref 8.7–10.3)
CHLORIDE SERPL-SCNC: 102 MMOL/L (ref 96–106)
CO2 SERPL-SCNC: 26 MMOL/L (ref 20–29)
CREAT SERPL-MCNC: 0.76 MG/DL (ref 0.57–1)
EGFRCR SERPLBLD CKD-EPI 2021: 86 ML/MIN/1.73
GLOBULIN SER CALC-MCNC: 2.4 G/DL (ref 1.5–4.5)
GLUCOSE SERPL-MCNC: 100 MG/DL (ref 70–99)
POTASSIUM SERPL-SCNC: 4.5 MMOL/L (ref 3.5–5.2)
PROT SERPL-MCNC: 7 G/DL (ref 6–8.5)
PTH-INTACT SERPL-MCNC: 30 PG/ML (ref 15–65)
SODIUM SERPL-SCNC: 141 MMOL/L (ref 134–144)

## 2024-11-06 NOTE — TELEPHONE ENCOUNTER
Incoming Refill Request      Medication requested (name and dose): LISINOPRIL PT STATES IT IS SUPPOSED TO BE 40MG NOT 20MG    Pharm[facility] where request should be sent: [facility]    Additional details provided by patient:     Best call back number: [phone]    Does the patient have less than a 3 day supply:  [] Yes  [x] No    Mariana Villa Rep  04/05/22, 08:59 EDT           cinacalcet (SENSIPAR) 30 MG tablet  Pending    Insurance response  Prescription Drug Insurance: [employer]  Notes: Prior authorization submitted - will update provider when decision has been made by insurance.

## 2024-12-05 ENCOUNTER — OFFICE VISIT (OUTPATIENT)
Dept: CARDIOLOGY | Facility: CLINIC | Age: 69
End: 2024-12-05
Payer: MEDICARE

## 2024-12-05 VITALS
DIASTOLIC BLOOD PRESSURE: 82 MMHG | RESPIRATION RATE: 18 BRPM | HEIGHT: 62 IN | BODY MASS INDEX: 25.36 KG/M2 | WEIGHT: 137.8 LBS | SYSTOLIC BLOOD PRESSURE: 150 MMHG | HEART RATE: 83 BPM | OXYGEN SATURATION: 95 %

## 2024-12-05 DIAGNOSIS — E78.2 MIXED HYPERLIPIDEMIA: ICD-10-CM

## 2024-12-05 DIAGNOSIS — I10 PRIMARY HYPERTENSION: ICD-10-CM

## 2024-12-05 DIAGNOSIS — R07.9 CHEST PAIN, UNSPECIFIED TYPE: Primary | ICD-10-CM

## 2024-12-05 DIAGNOSIS — R00.2 PALPITATIONS: ICD-10-CM

## 2024-12-05 PROCEDURE — 3077F SYST BP >= 140 MM HG: CPT | Performed by: INTERNAL MEDICINE

## 2024-12-05 PROCEDURE — 1160F RVW MEDS BY RX/DR IN RCRD: CPT | Performed by: INTERNAL MEDICINE

## 2024-12-05 PROCEDURE — 99214 OFFICE O/P EST MOD 30 MIN: CPT | Performed by: INTERNAL MEDICINE

## 2024-12-05 PROCEDURE — 3079F DIAST BP 80-89 MM HG: CPT | Performed by: INTERNAL MEDICINE

## 2024-12-05 PROCEDURE — 1159F MED LIST DOCD IN RCRD: CPT | Performed by: INTERNAL MEDICINE

## 2024-12-05 RX ORDER — HYDROCHLOROTHIAZIDE 12.5 MG/1
12.5 TABLET ORAL DAILY
Qty: 90 TABLET | Refills: 1 | Status: SHIPPED | OUTPATIENT
Start: 2024-12-05

## 2024-12-05 NOTE — ASSESSMENT & PLAN NOTE
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     Plan:  Continue Vascepa 2 g twice daily for high triglycerides.  Lipid profile next week.  Patient counseled in regards to heart healthy, low fat/ low cholesterol/low sat diet, daily exercise for 30 minutes, low to moderate intensity, and weight loss.     Patient Treatment Goals:   LDL goal is less than 70  Triglycerides goal less than 200     Followup in 6 months

## 2024-12-05 NOTE — ASSESSMENT & PLAN NOTE
Infrequent recurrence. Holter monitor 48 hours showing atrial tachycardia, not reported as symptomatic.  Transthoracic echocardiogram with normal ejection fraction mild MR and age-appropriate diastolic function after imaging review.  Ionized calcium normal.  Plan:  Continue metoprolol succinate ER 25 mg p.o. daily in p.m. for arrhythmia prevention  Keep magnesium more than 2 and potassium more than 4  Antiarrhythmic therapy if significant symptoms, currently well-tolerated.

## 2024-12-05 NOTE — ASSESSMENT & PLAN NOTE
No recurrence. Patient with risk factors: Age for gender, mixed hyperlipidemia, uncontrolled hypertension, past smoking.  Feeling better with no weakness or muscle pain.  Schedule stress test in 6 weeks for diagnosis of CAD/past chest pain and reassurance purposes.

## 2024-12-05 NOTE — ASSESSMENT & PLAN NOTE
Hypertension is uncontrolled likely related to weight gain, possibly variable salt diet.  Start hydrochlorothiazide 12.5 mg p.o. daily and reinforced low-salt diet.  Medication changes per orders.  Dietary sodium restriction.  Weight loss.  Regular aerobic exercise.  Ambulatory blood pressure monitoring.  Blood pressure will be reassessedin 6 months.

## 2024-12-05 NOTE — PROGRESS NOTES
"[facility]  [facility] CARDIOLOGY  [address] [place] [address]  Dept: [phone]  Dept Fax: [phone]    Date: 12/05/2024  Patient: Linda Good  YOB: 1955    Follow Up Office Visit Note    Interval Follow-up  Ms. Linda Good is a 69 y.o. female who is here for follow-up on Chest Pain, Hypertension, and Hyperlipidemia.    Subjective   Patient doing well with no complaints.  Infrequent short-lived palpitations.  Patient denies angina, orthopnea, PND, lightheadedness, syncope or medications side-effects.      The following portions of the patient's history were reviewed and updated as appropriate: allergies, current medications, past family history, past medical history, past social history, past surgical history, and problem list.    Medications:   Allergies   Allergen Reactions    Fenofibrate Myalgia     Arthralgia    Rosuvastatin Myalgia      Current Outpatient Medications   Medication Instructions    amLODIPine (NORVASC) 2.5 mg, Oral, Daily    ascorbic acid (VITAMIN C) 1,000 mg, Daily    cholecalciferol (VITAMIN D3) 1,000 Units, Daily    cyanocobalamin (VITAMIN B-12) 500 mcg, Daily    hydroCHLOROthiazide 12.5 mg, Oral, Daily    icosapent ethyl (VASCEPA) 2 g, Oral, 2 Times Daily With Meals    metoprolol succinate XL (TOPROL-XL) 25 mg, Oral, Daily    vitamin B-1 50 mg, Daily    Vitamin E 200 Units, Daily       Tobacco Use: Medium Risk (12/5/2024)    Patient History     Smoking Tobacco Use: Former     Smokeless Tobacco Use: Never     Passive Exposure: Past        Objective  Vitals:    12/05/24 1038   BP: 150/82   Pulse: 83   Resp: 18   SpO2: 95%   Weight: 62.5 kg (137 lb 12.8 oz)   Height: 157.5 cm (62\")   PainSc: 0-No pain      Vitals:    12/05/24 1038   BP: 150/82   Pulse: 83   Resp: 18   SpO2: 95%   Weight: 62.5 kg (137 lb 12.8 oz)   Height: 157.5 cm (62\")          Physical Exam  Constitutional:       Appearance: Not in distress.   Neck:      " "Vascular: JVD normal.   Pulmonary:      Breath sounds: Normal breath sounds.   Cardiovascular:      Normal rate. Regular rhythm.      Murmurs: There is no murmur.   Pulses:     Intact distal pulses.   Edema:     Peripheral edema absent.   Neurological:      Mental Status: Alert.              Diagnostic Data  Lab Results   Component Value Date     10/28/2024    K 4.5 10/28/2024     10/28/2024    CO2 26 10/28/2024    MG 2.1 09/05/2024    BUN 12 10/28/2024    CREATININE 0.76 10/28/2024    CALCIUM 10.1 10/28/2024    BILITOT 0.3 10/28/2024    ALKPHOS 60 10/28/2024    ALT 26 10/28/2024    AST 19 10/28/2024    GLUCOSE 100 (H) 10/28/2024    ALBUMIN 4.6 10/28/2024     Lab Results   Component Value Date    WBC 8.07 09/16/2024    HGB 14.4 09/16/2024    HCT 43.2 09/16/2024     09/16/2024     No results found for: \"APTT\", \"INR\", \"PTT\"  Lab Results   Component Value Date    CKTOTAL 48 09/16/2024    CKTOTAL 120 05/21/2024     No results found for: \"BNP\", \"PROBNP\"    Lab Results   Component Value Date    CHLPL 217 (H) 09/05/2024    TRIG 100 09/05/2024    HDL 75 09/05/2024     (H) 09/05/2024     Lab Results   Component Value Date    TSH 2.480 09/16/2024    FREET4 1.11 09/16/2024       CV Diagnostics:  Procedures    CXR: Results for orders placed in visit on 09/16/24    XR Chest 2 View    Narrative  XR CHEST 2 VW    Date of Exam: 9/16/2024 2:41 PM EDT    Indication: unexplained pain    Comparison: Chest x-ray dated 6/3/2024    Findings:  Left upper lobe granuloma. Calcified left hilar lymph nodes noted. The lungs appear adequately aerated without consolidation or mass. No pleural effusion or pneumothorax is identified. The cardiomediastinal silhouette and pulmonary vasculature appear  within normal limits. No acute or suspicious osseous lesion is identified.    Impression  Impression:  1.No acute radiographic abnormality is identified.      Electronically Signed: Mac Estrella MD  9/16/2024 4:02 PM " EDT  Workstation ID: [id number]     ECHO/MUGA: Results for orders placed during the hospital encounter of 05/20/24    Adult Transthoracic Echo Complete W/ Cont if Necessary Per Protocol    Interpretation Summary    Left ventricular systolic function is normal. Left ventricular ejection fraction appears to be 61 - 65%.    Mild mitral valve regurgitation is present.    Mild tricuspid valve regurgitation is present.    Estimated right ventricular systolic pressure from tricuspid regurgitation is normal (<35 mmHg).     STRESS TESTS: No results found for this or any previous visit.     CARDIAC CATH: No results found for this or any previous visit.     DEVICES: No valid procedures specified.   HOLTER: Results for orders placed in visit on 04/22/24    Holter Monitor - 48 Hour    Interpretation Summary    An abnormal monitor study.    No events reported by patient button press/no diary provided.    There were 2 episodes of atrial tachycardia recorded, longest/fastest 8 beats [average heart rate 136 bpm, max heart rate 171 bpm], likely asymptomatic since not recorded by button press/on event log.    There was no atrial fibrillation, VT, cardiac pauses or heart blocks detected.     CT/MRI:  Results for orders placed during the hospital encounter of 05/20/24    CT Chest Without Contrast Diagnostic    Narrative  CT CHEST WO CONTRAST DIAGNOSTIC, CT ABDOMEN PELVIS W CONTRAST    Date of Exam: 5/22/2024 12:11 PM EDT    Indication: cough. Abdominal pain    Comparison: None available.    Technique: Axial CT images were obtained of the chest without contrast administration. Axial CT images were obtained of the abdomen and pelvis after intravenous administration of 75 cc Isovue-300. Reconstructed coronal and sagittal images were also  obtained. Automated exposure control and iterative construction methods were used.      Findings:    CT CHEST:  MEDIASTINUM: Unremarkable. Aortic and heart size are normal. No mass nor pericardial  effusion.  CORONARY ARTERIES: No calcified atherosclerotic disease.  LUNGS: There is nodular and more confluent airspace disease within the lower lungs bilaterally, left more so than right. There is a degree of interlobular septal thickening/interstitial edema. Imaging features are most suggestive of multifocal pneumonia.  No single suspicious nodule is identified.  PLEURAL SPACE: There are small bilateral pleural effusions.        CT ABDOMEN AND PELVIS:  LIVER:  Unremarkable parenchyma without focal lesion.  BILIARY/GALLBLADDER:  Unremarkable  SPLEEN:  Unremarkable  PANCREAS:  Unremarkable  ADRENAL:  Unremarkable  KIDNEYS:  Unremarkable parenchyma with no solid mass identified. No obstruction.  No calculus identified.  GASTROINTESTINAL/MESENTERY:  No evidence of obstruction nor inflammation. The appendix is normal.  AORTA/IVC:  Normal caliber.    RETROPERITONEUM/LYMPH NODES:  Unremarkable    REPRODUCTIVE: There is a right-sided uterine fibroid. There is minimal free fluid in the pelvis, a frequent normal finding in females. No definitive etiology identified.  BLADDER:  Unremarkable    OSSEUS STRUCTURES:  Typical for age with no acute process identified.    Impression  Impression:  1.Multifocal pneumonia with small bilateral parapneumonic effusions.  2.Minimal nonspecific free fluid in the pelvis, a frequent normal finding in females as no etiology identified.  3.Other incidental nonemergent findings as detailed above.        Electronically Signed: Reid Banerjee MD  5/22/2024 12:48 PM EDT  Workstation ID: [id number]    VASCULAR: No valid procedures specified.     Assessment and Plan  Diagnoses and all orders for this visit:    1. Chest pain, unspecified type (Primary)  Assessment & Plan:  No recurrence. Patient with risk factors: Age for gender, mixed hyperlipidemia, uncontrolled hypertension, past smoking.  Feeling better with no weakness or muscle pain.  Schedule stress test in 6 weeks for diagnosis of CAD/past  chest pain and reassurance purposes.    Orders:  -     Treadmill Stress Test; Future    2. Primary hypertension  Assessment & Plan:  Hypertension is uncontrolled likely related to weight gain, possibly variable salt diet.  Start hydrochlorothiazide 12.5 mg p.o. daily and reinforced low-salt diet.  Medication changes per orders.  Dietary sodium restriction.  Weight loss.  Regular aerobic exercise.  Ambulatory blood pressure monitoring.  Blood pressure will be reassessedin 6 months.      3. 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     Plan:  Continue Vascepa 2 g twice daily for high triglycerides.  Lipid profile next week.  Patient counseled in regards to heart healthy, low fat/ low cholesterol/low sat diet, daily exercise for 30 minutes, low to moderate intensity, and weight loss.     Patient Treatment Goals:   LDL goal is less than 70  Triglycerides goal less than 200     Followup in 6 months      4. Palpitations  Assessment & Plan:  Infrequent recurrence. Holter monitor 48 hours showing atrial tachycardia, not reported as symptomatic.  Transthoracic echocardiogram with normal ejection fraction mild MR and age-appropriate diastolic function after imaging review.  Ionized calcium normal.  Plan:  Continue metoprolol succinate ER 25 mg p.o. daily in p.m. for arrhythmia prevention  Keep magnesium more than 2 and potassium more than 4  Antiarrhythmic therapy if significant symptoms, currently well-tolerated.      Other orders  -     hydroCHLOROthiazide 12.5 MG tablet; Take 1 tablet by mouth Daily.  Dispense: 90 tablet; Refill: 1         Return in about 6 months (around 6/5/2025) for Follow-up with Dr Alvares.    There are no Patient Instructions on file for this visit.    Josef Alvares MD

## 2024-12-12 ENCOUNTER — CLINICAL SUPPORT (OUTPATIENT)
Dept: CARDIOLOGY | Facility: CLINIC | Age: 69
End: 2024-12-12
Payer: MEDICARE

## 2024-12-12 DIAGNOSIS — E78.2 MIXED HYPERLIPIDEMIA: Primary | ICD-10-CM

## 2024-12-12 PROCEDURE — 36415 COLL VENOUS BLD VENIPUNCTURE: CPT | Performed by: INTERNAL MEDICINE

## 2024-12-12 NOTE — PROGRESS NOTES
Venipuncture Blood Specimen Collection  Venipuncture performed in clinic by Marisabel Rodríguez RN with good hemostasis. Patient tolerated the procedure well without complications.   12/12/24   Marisabel Rodríguez RN

## 2024-12-13 LAB
CHOLEST SERPL-MCNC: 313 MG/DL (ref 100–199)
HDLC SERPL-MCNC: 69 MG/DL
LDL CALC COMMENT:: ABNORMAL
LDLC SERPL CALC-MCNC: 206 MG/DL (ref 0–99)
TRIGL SERPL-MCNC: 201 MG/DL (ref 0–149)
VLDLC SERPL CALC-MCNC: 38 MG/DL (ref 5–40)

## 2024-12-17 ENCOUNTER — TELEPHONE (OUTPATIENT)
Dept: CARDIOLOGY | Facility: CLINIC | Age: 69
End: 2024-12-17
Payer: MEDICARE

## 2024-12-17 DIAGNOSIS — E78.2 MIXED HYPERLIPIDEMIA: Primary | ICD-10-CM

## 2024-12-17 RX ORDER — EZETIMIBE 10 MG/1
10 TABLET ORAL DAILY
Qty: 30 TABLET | Refills: 5 | Status: SHIPPED | OUTPATIENT
Start: 2024-12-17

## 2024-12-17 NOTE — TELEPHONE ENCOUNTER
----- Message from Marisabel BEARDEN sent at 12/17/2024  2:31 PM EST -----    ----- Message -----  From: Marisabel Rodríguez RN  Sent: 12/17/2024   2:30 PM EST  To: Josef Alvares MD    Patient is asking if she can stop Vascepa? She thinks it is contributing to her weight gain  ----- Message -----  From: Josef Alvares MD  Sent: 12/16/2024  10:47 PM EST  To: Marisabel Rodríguez RN    Please inform patient that her Lipid profile was markedly abnormal. To consider ezetimibe / non-statin based cholesterol medicine, to see if better tolerated. If agreeable please order ezetimibe 10 mg daily 30 days supply 5 refills.  Thank you!

## 2024-12-17 NOTE — TELEPHONE ENCOUNTER
Patient notified of lab results, patient verbalized understanding.   Patient agreeable to exetimibe

## 2024-12-20 ENCOUNTER — TELEPHONE (OUTPATIENT)
Dept: CARDIOLOGY | Facility: CLINIC | Age: 69
End: 2024-12-20
Payer: MEDICARE

## 2024-12-20 NOTE — TELEPHONE ENCOUNTER
PT STATES THAT WE SAID WE WOULD CALL HER BACK AFTER CHECKING ON IF SHE WAS STILL SUPPOSED TO TAKE VASCEPA    DIDN'T SEE ANYTHING DOCUMENTED    PT STATES SHE IS ALSO GAINING WEIGHT - BELIEVES FROM MEDICATION    PLEASE ADVISE PT

## 2024-12-23 NOTE — TELEPHONE ENCOUNTER
Spoke with patient. Explained that she could continue taking both zetia and vascepa. She has stopped taking two capsules of vascepa at night. Since her appetite has lessened. Please advise on her weight issue with taking vascepa.

## 2024-12-26 NOTE — TELEPHONE ENCOUNTER
READ OFF NOTES TO PT    PT WILL STOP VASCEPA     PT IS WONDERING IF SHE NEEDS TO COME IN AND GET CHECKED SINCE SHE IS STOPPING THAT MEDICATION    PLEASE ADVISE PT

## 2024-12-26 NOTE — TELEPHONE ENCOUNTER
Per Dr Alvares patient can stop Vascepa as it can cause weight gain. Attempted to contact patient, no answer left voicemail requesting call back.   OK for Hub to relay- ok to stop vascepa

## 2024-12-26 NOTE — TELEPHONE ENCOUNTER
Spoke with patient, ok to stop Vascepa, no need to come in at this time. Patient verbalized understanding

## 2025-05-28 RX ORDER — METOPROLOL SUCCINATE 25 MG/1
25 TABLET, EXTENDED RELEASE ORAL DAILY
Qty: 90 TABLET | Refills: 3 | Status: SHIPPED | OUTPATIENT
Start: 2025-05-28

## 2025-06-02 RX ORDER — HYDROCHLOROTHIAZIDE 12.5 MG/1
12.5 TABLET ORAL DAILY
Qty: 90 TABLET | Refills: 1 | Status: SHIPPED | OUTPATIENT
Start: 2025-06-02

## 2025-06-05 ENCOUNTER — OFFICE VISIT (OUTPATIENT)
Dept: CARDIOLOGY | Facility: CLINIC | Age: 70
End: 2025-06-05
Payer: MEDICARE

## 2025-06-05 VITALS
DIASTOLIC BLOOD PRESSURE: 86 MMHG | SYSTOLIC BLOOD PRESSURE: 134 MMHG | RESPIRATION RATE: 18 BRPM | BODY MASS INDEX: 24.99 KG/M2 | HEIGHT: 62 IN | OXYGEN SATURATION: 95 % | HEART RATE: 43 BPM | WEIGHT: 135.8 LBS

## 2025-06-05 DIAGNOSIS — R07.9 CHEST PAIN, UNSPECIFIED TYPE: Primary | ICD-10-CM

## 2025-06-05 DIAGNOSIS — Z13.1 ENCOUNTER FOR SCREENING FOR DIABETES MELLITUS: ICD-10-CM

## 2025-06-05 DIAGNOSIS — R00.2 PALPITATIONS: ICD-10-CM

## 2025-06-05 DIAGNOSIS — I10 PRIMARY HYPERTENSION: ICD-10-CM

## 2025-06-05 DIAGNOSIS — E78.2 MIXED HYPERLIPIDEMIA: ICD-10-CM

## 2025-06-05 RX ORDER — AMLODIPINE BESYLATE 2.5 MG/1
2.5 TABLET ORAL DAILY
Qty: 90 TABLET | Refills: 2 | Status: SHIPPED | OUTPATIENT
Start: 2025-06-05

## 2025-06-05 NOTE — ASSESSMENT & PLAN NOTE
Hypertension is borderline, told in office readings, controlled on home readings.  Continue current treatment regimen.  Dietary sodium restriction.  Weight loss.  Regular aerobic exercise.  Ambulatory blood pressure monitoring.  Blood pressure will be reassessedin 6 months.

## 2025-06-05 NOTE — ASSESSMENT & PLAN NOTE
No recurrence. Patient with risk factors: Age for gender, mixed hyperlipidemia, uncontrolled hypertension, past smoking.  Feeling better with no weakness or muscle pain.  Clinic follow-up.  Aggressive risk factors modification LDL target less than 70 mg/dL.

## 2025-06-05 NOTE — PROGRESS NOTES
"[facility]  [facility] CARDIOLOGY  [address] [place] [address]  Dept: [phone]  Dept Fax: [phone]    Date: 06/05/2025  Patient: Linda Good  YOB: 1955    Follow Up Office Visit Note    Interval Follow-up  Ms. Linda Good is a 70 y.o. female who is here for follow-up on Chest Pain.    Subjective   Patient doing well with no acute complaints.  Patient denies angina, orthopnea, PND, palpitations, lightheadedness, syncope or medications side-effects.    The following portions of the patient's history were reviewed and updated as appropriate: allergies, current medications, past family history, past medical history, past social history, past surgical history, and problem list.    Medications:   Allergies   Allergen Reactions    Fenofibrate Myalgia     Arthralgia    Rosuvastatin Myalgia      Current Outpatient Medications   Medication Instructions    amLODIPine (NORVASC) 2.5 mg, Oral, Daily    ascorbic acid (VITAMIN C) 1,000 mg, Daily    cholecalciferol (VITAMIN D3) 1,000 Units, Daily    cyanocobalamin (VITAMIN B-12) 500 mcg, Daily    ezetimibe (ZETIA) 10 mg, Oral, Daily    hydroCHLOROthiazide 12.5 mg, Oral, Daily    metoprolol succinate XL (TOPROL-XL) 25 mg, Oral, Daily    vitamin B-1 50 mg, Daily    Vitamin E 200 Units, Daily       Tobacco Use: Medium Risk (6/5/2025)    Patient History     Smoking Tobacco Use: Former     Smokeless Tobacco Use: Never     Passive Exposure: Past        Objective  Vitals:    06/05/25 1335   BP: 134/86   Pulse: (!) 43   Resp: 18   SpO2: 95%   Weight: 61.6 kg (135 lb 12.8 oz)   Height: 157.5 cm (62\")   PainSc: 0-No pain      Vitals:    06/05/25 1335   BP: 134/86   Pulse: (!) 43   Resp: 18   SpO2: 95%   Weight: 61.6 kg (135 lb 12.8 oz)   Height: 157.5 cm (62\")          Physical Exam  Constitutional:       Appearance: Not in distress.   Neck:      Vascular: JVD normal.   Pulmonary:      Breath sounds: Normal breath sounds. " "  Cardiovascular:      Normal rate. Regular rhythm.      Murmurs: There is no murmur.   Pulses:     Intact distal pulses.   Edema:     Peripheral edema absent.   Neurological:      Mental Status: Alert.              Diagnostic Data  Lab Results   Component Value Date     10/28/2024    K 4.5 10/28/2024     10/28/2024    CO2 26 10/28/2024    MG 2.1 09/05/2024    BUN 12 10/28/2024    CREATININE 0.76 10/28/2024    CALCIUM 10.1 10/28/2024    BILITOT 0.3 10/28/2024    ALKPHOS 60 10/28/2024    ALT 26 10/28/2024    AST 19 10/28/2024    GLUCOSE 100 (H) 10/28/2024    ALBUMIN 4.6 10/28/2024     Lab Results   Component Value Date    WBC 8.07 09/16/2024    HGB 14.4 09/16/2024    HCT 43.2 09/16/2024     09/16/2024     No results found for: \"APTT\", \"INR\", \"PTT\"  Lab Results   Component Value Date    CKTOTAL 48 09/16/2024    CKTOTAL 120 05/21/2024     No results found for: \"BNP\", \"PROBNP\"    Lab Results   Component Value Date    CHLPL 313 (H) 12/12/2024    TRIG 201 (H) 12/12/2024    HDL 69 12/12/2024     (H) 12/12/2024     Lab Results   Component Value Date    TSH 2.480 09/16/2024    FREET4 1.11 09/16/2024       CV Diagnostics:  Procedures  CXR: Results for orders placed in visit on 09/16/24    XR Chest 2 View    Narrative  XR CHEST 2 VW    Date of Exam: 9/16/2024 2:41 PM EDT    Indication: unexplained pain    Comparison: Chest x-ray dated 6/3/2024    Findings:  Left upper lobe granuloma. Calcified left hilar lymph nodes noted. The lungs appear adequately aerated without consolidation or mass. No pleural effusion or pneumothorax is identified. The cardiomediastinal silhouette and pulmonary vasculature appear  within normal limits. No acute or suspicious osseous lesion is identified.    Impression  Impression:  1.No acute radiographic abnormality is identified.      Electronically Signed: Mac Estrella MD  9/16/2024 4:02 PM EDT  Workstation ID: [id number]     ECHO/MUGA: Results for orders placed during the " hospital encounter of 05/20/24    Adult Transthoracic Echo Complete W/ Cont if Necessary Per Protocol    Interpretation Summary    Left ventricular systolic function is normal. Left ventricular ejection fraction appears to be 61 - 65%.    Mild mitral valve regurgitation is present.    Mild tricuspid valve regurgitation is present.    Estimated right ventricular systolic pressure from tricuspid regurgitation is normal (<35 mmHg).     STRESS TESTS: No results found for this or any previous visit.     CARDIAC CATH: No results found for this or any previous visit.     DEVICES: No valid procedures specified.   HOLTER: Results for orders placed in visit on 04/22/24    Holter Monitor - 48 Hour    Interpretation Summary    An abnormal monitor study.    No events reported by patient button press/no diary provided.    There were 2 episodes of atrial tachycardia recorded, longest/fastest 8 beats [average heart rate 136 bpm, max heart rate 171 bpm], likely asymptomatic since not recorded by button press/on event log.    There was no atrial fibrillation, VT, cardiac pauses or heart blocks detected.     CT/MRI:  Results for orders placed during the hospital encounter of 05/20/24    CT Chest Without Contrast Diagnostic    Narrative  CT CHEST WO CONTRAST DIAGNOSTIC, CT ABDOMEN PELVIS W CONTRAST    Date of Exam: 5/22/2024 12:11 PM EDT    Indication: cough. Abdominal pain    Comparison: None available.    Technique: Axial CT images were obtained of the chest without contrast administration. Axial CT images were obtained of the abdomen and pelvis after intravenous administration of 75 cc Isovue-300. Reconstructed coronal and sagittal images were also  obtained. Automated exposure control and iterative construction methods were used.      Findings:    CT CHEST:  MEDIASTINUM: Unremarkable. Aortic and heart size are normal. No mass nor pericardial effusion.  CORONARY ARTERIES: No calcified atherosclerotic disease.  LUNGS: There is  nodular and more confluent airspace disease within the lower lungs bilaterally, left more so than right. There is a degree of interlobular septal thickening/interstitial edema. Imaging features are most suggestive of multifocal pneumonia.  No single suspicious nodule is identified.  PLEURAL SPACE: There are small bilateral pleural effusions.        CT ABDOMEN AND PELVIS:  LIVER:  Unremarkable parenchyma without focal lesion.  BILIARY/GALLBLADDER:  Unremarkable  SPLEEN:  Unremarkable  PANCREAS:  Unremarkable  ADRENAL:  Unremarkable  KIDNEYS:  Unremarkable parenchyma with no solid mass identified. No obstruction.  No calculus identified.  GASTROINTESTINAL/MESENTERY:  No evidence of obstruction nor inflammation. The appendix is normal.  AORTA/IVC:  Normal caliber.    RETROPERITONEUM/LYMPH NODES:  Unremarkable    REPRODUCTIVE: There is a right-sided uterine fibroid. There is minimal free fluid in the pelvis, a frequent normal finding in females. No definitive etiology identified.  BLADDER:  Unremarkable    OSSEUS STRUCTURES:  Typical for age with no acute process identified.    Impression  Impression:  1.Multifocal pneumonia with small bilateral parapneumonic effusions.  2.Minimal nonspecific free fluid in the pelvis, a frequent normal finding in females as no etiology identified.  3.Other incidental nonemergent findings as detailed above.        Electronically Signed: Reid Banerjee MD  5/22/2024 12:48 PM EDT  Workstation ID: [id number]    VASCULAR: No valid procedures specified.     Assessment and Plan  Diagnoses and all orders for this visit:    1. Chest pain, unspecified type (Primary)  Assessment & Plan:  No recurrence. Patient with risk factors: Age for gender, mixed hyperlipidemia, uncontrolled hypertension, past smoking.  Feeling better with no weakness or muscle pain.  Clinic follow-up.  Aggressive risk factors modification LDL target less than 70 mg/dL.    Orders:  -     Hemoglobin A1c    2. Primary  hypertension  Assessment & Plan:  Hypertension is borderline, told in office readings, controlled on home readings.  Continue current treatment regimen.  Dietary sodium restriction.  Weight loss.  Regular aerobic exercise.  Ambulatory blood pressure monitoring.  Blood pressure will be reassessedin 6 months.    Orders:  -     Basic Metabolic Panel  -     Lipid Panel    3. 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     Plan:  Continue Vascepa 2 g twice daily for high triglycerides, ezetimibe 10 mg p.o. daily for hypercholesterolemia.    Patient counseled in regards to heart healthy, low fat/ low cholesterol/low sat diet, daily exercise for 30 minutes, low to moderate intensity, and weight loss.     Lab Results   Component Value Date     (H) 12/12/2024     (H) 09/05/2024    HDL 69 12/12/2024    HDL 75 09/05/2024    CHLPL 313 (H) 12/12/2024    CHLPL 217 (H) 09/05/2024    TRIG 201 (H) 12/12/2024    TRIG 100 09/05/2024      Patient Treatment Goals:   LDL goal is less than 70  Triglycerides goal less than 200  Lipid profile today.    Followup in 6 months      4. Palpitations  Assessment & Plan:  No recurrence.  Holter monitor 48 hours showing atrial tachycardia, not reported as symptomatic.  Transthoracic echocardiogram with normal ejection fraction mild MR and age-appropriate diastolic function after imaging review.  Ionized calcium normal.  BP controlled.  Plan:  Blood pressure control as per problem list  Keep magnesium more than 2 and potassium more than 4  Clinical follow-up      5. Encounter for screening for diabetes mellitus  -     Hemoglobin A1c    Other orders  -     amLODIPine (NORVASC) 2.5 MG tablet; Take 1 tablet by mouth Daily.  Dispense: 90 tablet; Refill: 2         Return in about 6 months (around 12/5/2025) for Follow-up with Dr Alvares.    There are no Patient  Instructions on file for this visit.    Josef Alvares MD

## 2025-06-05 NOTE — ASSESSMENT & PLAN NOTE
No recurrence.  Holter monitor 48 hours showing atrial tachycardia, not reported as symptomatic.  Transthoracic echocardiogram with normal ejection fraction mild MR and age-appropriate diastolic function after imaging review.  Ionized calcium normal.  BP controlled.  Plan:  Blood pressure control as per problem list  Keep magnesium more than 2 and potassium more than 4  Clinical follow-up

## 2025-06-05 NOTE — ASSESSMENT & PLAN NOTE
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     Plan:  Continue Vascepa 2 g twice daily for high triglycerides, ezetimibe 10 mg p.o. daily for hypercholesterolemia.    Patient counseled in regards to heart healthy, low fat/ low cholesterol/low sat diet, daily exercise for 30 minutes, low to moderate intensity, and weight loss.     Lab Results   Component Value Date     (H) 12/12/2024     (H) 09/05/2024    HDL 69 12/12/2024    HDL 75 09/05/2024    CHLPL 313 (H) 12/12/2024    CHLPL 217 (H) 09/05/2024    TRIG 201 (H) 12/12/2024    TRIG 100 09/05/2024      Patient Treatment Goals:   LDL goal is less than 70  Triglycerides goal less than 200  Lipid profile today.    Followup in 6 months

## 2025-06-06 LAB
BUN SERPL-MCNC: 13 MG/DL (ref 8–27)
BUN/CREAT SERPL: 16 (ref 12–28)
CALCIUM SERPL-MCNC: 10.5 MG/DL (ref 8.7–10.3)
CHLORIDE SERPL-SCNC: 98 MMOL/L (ref 96–106)
CHOLEST SERPL-MCNC: 295 MG/DL (ref 100–199)
CO2 SERPL-SCNC: 25 MMOL/L (ref 20–29)
CREAT SERPL-MCNC: 0.79 MG/DL (ref 0.57–1)
EGFRCR SERPLBLD CKD-EPI 2021: 81 ML/MIN/1.73
GLUCOSE SERPL-MCNC: 97 MG/DL (ref 70–99)
HBA1C MFR BLD: 5.9 % (ref 4.8–5.6)
HDLC SERPL-MCNC: 73 MG/DL
LDL CALC COMMENT:: ABNORMAL
LDLC SERPL CALC-MCNC: 164 MG/DL (ref 0–99)
POTASSIUM SERPL-SCNC: 4.2 MMOL/L (ref 3.5–5.2)
SODIUM SERPL-SCNC: 139 MMOL/L (ref 134–144)
TRIGL SERPL-MCNC: 311 MG/DL (ref 0–149)
VLDLC SERPL CALC-MCNC: 58 MG/DL (ref 5–40)

## 2025-06-08 ENCOUNTER — RESULTS FOLLOW-UP (OUTPATIENT)
Dept: CARDIOLOGY | Facility: CLINIC | Age: 70
End: 2025-06-08
Payer: MEDICARE

## 2025-06-09 NOTE — TELEPHONE ENCOUNTER
"Pt called, concerned about her lab results. Stated that her results \"are going in the wrong direction\" and wants to discuss the Nexletol Dr Alvares put her on. Believes they are to blame for her labs getting worse. Please advise and contact pt when available.  "

## 2025-06-10 NOTE — TELEPHONE ENCOUNTER
RN discussed with patient, patient feesl cholesterol was under better control with stain and does not think her previous muscle cramps were related to stain. RN discussed with Dr. Alvares.  Per Dr. Alvares- I am not opposed to her trying a different statin, atorvastatin 10 mg if she can take with coq.10 100 mg daily and vitamin D supplements, to maximize the chance of success and minimize side effects.To discontinue ezetimibe while trying the atorvastatin  Thank you!     Rn called patient to discuss, no answer, left voicemail requesting call back

## 2025-06-11 RX ORDER — ATORVASTATIN CALCIUM 10 MG/1
10 TABLET, FILM COATED ORAL DAILY
Qty: 30 TABLET | Refills: 3 | Status: SHIPPED | OUTPATIENT
Start: 2025-06-11

## 2025-06-16 ENCOUNTER — TELEPHONE (OUTPATIENT)
Dept: CARDIOLOGY | Facility: CLINIC | Age: 70
End: 2025-06-16
Payer: MEDICARE

## 2025-06-16 NOTE — TELEPHONE ENCOUNTER
Hawa was d/c 12/2024.  Called pt to verify she was not taking medication.   Per pt she is not taking meds.  Notified pharmacy of med being d/c

## (undated) DEVICE — NDL SPINE 22G 31/2IN BLK

## (undated) DEVICE — LN INJ CONTRST FLXCIL HP BR F/M LL W/ADAPT/ROT 1200PSI 48IN

## (undated) DEVICE — TRY L/P SFTY A/20G

## (undated) DEVICE — NDL SPINE 18G 31/2IN PNK